# Patient Record
Sex: FEMALE | Race: WHITE | NOT HISPANIC OR LATINO | Employment: OTHER | URBAN - METROPOLITAN AREA
[De-identification: names, ages, dates, MRNs, and addresses within clinical notes are randomized per-mention and may not be internally consistent; named-entity substitution may affect disease eponyms.]

---

## 2020-05-21 ENCOUNTER — HOSPITAL ENCOUNTER (EMERGENCY)
Facility: HOSPITAL | Age: 75
Discharge: HOME | End: 2020-05-22
Attending: EMERGENCY MEDICINE
Payer: MEDICARE

## 2020-05-21 DIAGNOSIS — R10.32 LEFT LOWER QUADRANT ABDOMINAL PAIN: Primary | ICD-10-CM

## 2020-05-21 DIAGNOSIS — K57.92 ACUTE DIVERTICULITIS: ICD-10-CM

## 2020-05-21 DIAGNOSIS — R93.89 ABNORMAL CT SCAN: ICD-10-CM

## 2020-05-21 PROCEDURE — 99285 EMERGENCY DEPT VISIT HI MDM: CPT | Performed by: PHYSICIAN ASSISTANT

## 2020-05-21 PROCEDURE — 99284 EMERGENCY DEPT VISIT MOD MDM: CPT

## 2020-05-21 RX ORDER — GABAPENTIN 100 MG/1
200 CAPSULE ORAL 2 TIMES DAILY
COMMUNITY
End: 2021-03-01 | Stop reason: ALTCHOICE

## 2020-05-21 RX ORDER — LEVOTHYROXINE SODIUM 0.03 MG/1
25 TABLET ORAL DAILY
COMMUNITY

## 2020-05-22 ENCOUNTER — APPOINTMENT (EMERGENCY)
Dept: CT IMAGING | Facility: HOSPITAL | Age: 75
End: 2020-05-22
Payer: MEDICARE

## 2020-05-22 VITALS
RESPIRATION RATE: 18 BRPM | SYSTOLIC BLOOD PRESSURE: 118 MMHG | WEIGHT: 168.21 LBS | OXYGEN SATURATION: 94 % | DIASTOLIC BLOOD PRESSURE: 57 MMHG | HEART RATE: 68 BPM | TEMPERATURE: 98.3 F

## 2020-05-22 LAB
ALBUMIN SERPL BCP-MCNC: 3.1 G/DL (ref 3.5–5)
ALP SERPL-CCNC: 92 U/L (ref 46–116)
ALT SERPL W P-5'-P-CCNC: 18 U/L (ref 12–78)
ANION GAP SERPL CALCULATED.3IONS-SCNC: 9 MMOL/L (ref 4–13)
AST SERPL W P-5'-P-CCNC: 18 U/L (ref 5–45)
BASOPHILS # BLD AUTO: 0.13 THOUSANDS/ΜL (ref 0–0.1)
BASOPHILS NFR BLD AUTO: 1 % (ref 0–1)
BILIRUB SERPL-MCNC: 0.4 MG/DL (ref 0.2–1)
BUN SERPL-MCNC: 12 MG/DL (ref 5–25)
CALCIUM SERPL-MCNC: 8.7 MG/DL (ref 8.3–10.1)
CHLORIDE SERPL-SCNC: 103 MMOL/L (ref 100–108)
CO2 SERPL-SCNC: 29 MMOL/L (ref 21–32)
CREAT SERPL-MCNC: 1.02 MG/DL (ref 0.6–1.3)
EOSINOPHIL # BLD AUTO: 0.19 THOUSAND/ΜL (ref 0–0.61)
EOSINOPHIL NFR BLD AUTO: 2 % (ref 0–6)
ERYTHROCYTE [DISTWIDTH] IN BLOOD BY AUTOMATED COUNT: 14.3 % (ref 11.6–15.1)
GFR SERPL CREATININE-BSD FRML MDRD: 55 ML/MIN/1.73SQ M
GLUCOSE SERPL-MCNC: 91 MG/DL (ref 65–140)
HCT VFR BLD AUTO: 36.9 % (ref 34.8–46.1)
HGB BLD-MCNC: 12 G/DL (ref 11.5–15.4)
IMM GRANULOCYTES # BLD AUTO: 0.07 THOUSAND/UL (ref 0–0.2)
IMM GRANULOCYTES NFR BLD AUTO: 1 % (ref 0–2)
LACTATE SERPL-SCNC: 1 MMOL/L (ref 0.5–2)
LIPASE SERPL-CCNC: 48 U/L (ref 73–393)
LYMPHOCYTES # BLD AUTO: 1.74 THOUSANDS/ΜL (ref 0.6–4.47)
LYMPHOCYTES NFR BLD AUTO: 15 % (ref 14–44)
MCH RBC QN AUTO: 32.1 PG (ref 26.8–34.3)
MCHC RBC AUTO-ENTMCNC: 32.5 G/DL (ref 31.4–37.4)
MCV RBC AUTO: 99 FL (ref 82–98)
MONOCYTES # BLD AUTO: 0.86 THOUSAND/ΜL (ref 0.17–1.22)
MONOCYTES NFR BLD AUTO: 8 % (ref 4–12)
NEUTROPHILS # BLD AUTO: 8.47 THOUSANDS/ΜL (ref 1.85–7.62)
NEUTS SEG NFR BLD AUTO: 73 % (ref 43–75)
NRBC BLD AUTO-RTO: 0 /100 WBCS
PLATELET # BLD AUTO: 485 THOUSANDS/UL (ref 149–390)
PMV BLD AUTO: 10.1 FL (ref 8.9–12.7)
POTASSIUM SERPL-SCNC: 3.7 MMOL/L (ref 3.5–5.3)
PROT SERPL-MCNC: 6.7 G/DL (ref 6.4–8.2)
RBC # BLD AUTO: 3.74 MILLION/UL (ref 3.81–5.12)
SODIUM SERPL-SCNC: 141 MMOL/L (ref 136–145)
WBC # BLD AUTO: 11.46 THOUSAND/UL (ref 4.31–10.16)

## 2020-05-22 PROCEDURE — 85025 COMPLETE CBC W/AUTO DIFF WBC: CPT | Performed by: PHYSICIAN ASSISTANT

## 2020-05-22 PROCEDURE — 83690 ASSAY OF LIPASE: CPT | Performed by: PHYSICIAN ASSISTANT

## 2020-05-22 PROCEDURE — 80053 COMPREHEN METABOLIC PANEL: CPT | Performed by: PHYSICIAN ASSISTANT

## 2020-05-22 PROCEDURE — 83605 ASSAY OF LACTIC ACID: CPT | Performed by: PHYSICIAN ASSISTANT

## 2020-05-22 PROCEDURE — 74177 CT ABD & PELVIS W/CONTRAST: CPT

## 2020-05-22 PROCEDURE — 96374 THER/PROPH/DIAG INJ IV PUSH: CPT

## 2020-05-22 PROCEDURE — 36415 COLL VENOUS BLD VENIPUNCTURE: CPT | Performed by: PHYSICIAN ASSISTANT

## 2020-05-22 PROCEDURE — 96375 TX/PRO/DX INJ NEW DRUG ADDON: CPT

## 2020-05-22 RX ORDER — KETOROLAC TROMETHAMINE 30 MG/ML
15 INJECTION, SOLUTION INTRAMUSCULAR; INTRAVENOUS ONCE
Status: COMPLETED | OUTPATIENT
Start: 2020-05-22 | End: 2020-05-22

## 2020-05-22 RX ORDER — METRONIDAZOLE 500 MG/1
500 TABLET ORAL EVERY 8 HOURS SCHEDULED
Qty: 30 TABLET | Refills: 0 | Status: SHIPPED | OUTPATIENT
Start: 2020-05-22 | End: 2020-06-01

## 2020-05-22 RX ORDER — SULFAMETHOXAZOLE AND TRIMETHOPRIM 800; 160 MG/1; MG/1
1 TABLET ORAL 2 TIMES DAILY
Qty: 20 TABLET | Refills: 0 | Status: SHIPPED | OUTPATIENT
Start: 2020-05-22 | End: 2020-06-01

## 2020-05-22 RX ORDER — SULFAMETHOXAZOLE AND TRIMETHOPRIM 800; 160 MG/1; MG/1
1 TABLET ORAL ONCE
Status: COMPLETED | OUTPATIENT
Start: 2020-05-22 | End: 2020-05-22

## 2020-05-22 RX ORDER — ONDANSETRON 2 MG/ML
4 INJECTION INTRAMUSCULAR; INTRAVENOUS ONCE
Status: COMPLETED | OUTPATIENT
Start: 2020-05-22 | End: 2020-05-22

## 2020-05-22 RX ORDER — METRONIDAZOLE 500 MG/1
500 TABLET ORAL ONCE
Status: COMPLETED | OUTPATIENT
Start: 2020-05-22 | End: 2020-05-22

## 2020-05-22 RX ADMIN — ONDANSETRON 4 MG: 2 INJECTION INTRAMUSCULAR; INTRAVENOUS at 00:19

## 2020-05-22 RX ADMIN — SULFAMETHOXAZOLE AND TRIMETHOPRIM 1 TABLET: 800; 160 TABLET ORAL at 02:11

## 2020-05-22 RX ADMIN — KETOROLAC TROMETHAMINE 15 MG: 30 INJECTION, SOLUTION INTRAMUSCULAR at 02:11

## 2020-05-22 RX ADMIN — IOHEXOL 100 ML: 350 INJECTION, SOLUTION INTRAVENOUS at 01:05

## 2020-05-22 RX ADMIN — METRONIDAZOLE 500 MG: 500 TABLET ORAL at 02:11

## 2020-06-04 ENCOUNTER — APPOINTMENT (EMERGENCY)
Dept: RADIOLOGY | Facility: HOSPITAL | Age: 75
DRG: 813 | End: 2020-06-04
Payer: MEDICARE

## 2020-06-04 ENCOUNTER — HOSPITAL ENCOUNTER (INPATIENT)
Facility: HOSPITAL | Age: 75
LOS: 9 days | Discharge: HOME | DRG: 813 | End: 2020-06-14
Attending: EMERGENCY MEDICINE | Admitting: INTERNAL MEDICINE
Payer: MEDICARE

## 2020-06-04 DIAGNOSIS — M79.89 ARM SWELLING: ICD-10-CM

## 2020-06-04 DIAGNOSIS — R79.1 SUPRATHERAPEUTIC INR: Primary | ICD-10-CM

## 2020-06-04 DIAGNOSIS — I48.91 ATRIAL FIBRILLATION, UNSPECIFIED TYPE (HCC): ICD-10-CM

## 2020-06-04 DIAGNOSIS — K59.00 CONSTIPATION: ICD-10-CM

## 2020-06-04 DIAGNOSIS — Z95.2 H/O PROSTHETIC HEART VALVE: ICD-10-CM

## 2020-06-04 DIAGNOSIS — D62 ACUTE BLOOD LOSS ANEMIA: ICD-10-CM

## 2020-06-04 DIAGNOSIS — S40.021A ARM CONTUSION, RIGHT, INITIAL ENCOUNTER: ICD-10-CM

## 2020-06-04 LAB
BASOPHILS # BLD AUTO: 0.09 THOUSANDS/ΜL (ref 0–0.1)
BASOPHILS NFR BLD AUTO: 1 % (ref 0–1)
EOSINOPHIL # BLD AUTO: 0.13 THOUSAND/ΜL (ref 0–0.61)
EOSINOPHIL NFR BLD AUTO: 2 % (ref 0–6)
ERYTHROCYTE [DISTWIDTH] IN BLOOD BY AUTOMATED COUNT: 15.5 % (ref 11.6–15.1)
HCT VFR BLD AUTO: 31.8 % (ref 34.8–46.1)
HGB BLD-MCNC: 10 G/DL (ref 11.5–15.4)
IMM GRANULOCYTES # BLD AUTO: 0.04 THOUSAND/UL (ref 0–0.2)
IMM GRANULOCYTES NFR BLD AUTO: 1 % (ref 0–2)
INR PPP: >15 (ref 0.84–1.19)
LYMPHOCYTES # BLD AUTO: 1.44 THOUSANDS/ΜL (ref 0.6–4.47)
LYMPHOCYTES NFR BLD AUTO: 19 % (ref 14–44)
MCH RBC QN AUTO: 31.3 PG (ref 26.8–34.3)
MCHC RBC AUTO-ENTMCNC: 31.4 G/DL (ref 31.4–37.4)
MCV RBC AUTO: 99 FL (ref 82–98)
MONOCYTES # BLD AUTO: 0.69 THOUSAND/ΜL (ref 0.17–1.22)
MONOCYTES NFR BLD AUTO: 9 % (ref 4–12)
NEUTROPHILS # BLD AUTO: 5.13 THOUSANDS/ΜL (ref 1.85–7.62)
NEUTS SEG NFR BLD AUTO: 68 % (ref 43–75)
NRBC BLD AUTO-RTO: 0 /100 WBCS
PLATELET # BLD AUTO: 382 THOUSANDS/UL (ref 149–390)
PMV BLD AUTO: 9.6 FL (ref 8.9–12.7)
PROTHROMBIN TIME: 115.9 SECONDS (ref 11.6–14.5)
RBC # BLD AUTO: 3.2 MILLION/UL (ref 3.81–5.12)
WBC # BLD AUTO: 7.52 THOUSAND/UL (ref 4.31–10.16)

## 2020-06-04 PROCEDURE — 36415 COLL VENOUS BLD VENIPUNCTURE: CPT | Performed by: EMERGENCY MEDICINE

## 2020-06-04 PROCEDURE — 99220 PR INITIAL OBSERVATION CARE/DAY 70 MINUTES: CPT | Performed by: NURSE PRACTITIONER

## 2020-06-04 PROCEDURE — 99284 EMERGENCY DEPT VISIT MOD MDM: CPT

## 2020-06-04 PROCEDURE — 99284 EMERGENCY DEPT VISIT MOD MDM: CPT | Performed by: EMERGENCY MEDICINE

## 2020-06-04 PROCEDURE — 73060 X-RAY EXAM OF HUMERUS: CPT

## 2020-06-04 PROCEDURE — 85610 PROTHROMBIN TIME: CPT | Performed by: EMERGENCY MEDICINE

## 2020-06-04 PROCEDURE — 85025 COMPLETE CBC W/AUTO DIFF WBC: CPT | Performed by: EMERGENCY MEDICINE

## 2020-06-04 RX ORDER — MAGNESIUM HYDROXIDE/ALUMINUM HYDROXICE/SIMETHICONE 120; 1200; 1200 MG/30ML; MG/30ML; MG/30ML
30 SUSPENSION ORAL EVERY 6 HOURS PRN
Status: DISCONTINUED | OUTPATIENT
Start: 2020-06-04 | End: 2020-06-14 | Stop reason: HOSPADM

## 2020-06-04 RX ORDER — TRAZODONE HYDROCHLORIDE 50 MG/1
50 TABLET ORAL
Status: DISCONTINUED | OUTPATIENT
Start: 2020-06-04 | End: 2020-06-14 | Stop reason: HOSPADM

## 2020-06-04 RX ORDER — VENLAFAXINE 37.5 MG/1
75 TABLET ORAL 2 TIMES DAILY
Status: DISCONTINUED | OUTPATIENT
Start: 2020-06-05 | End: 2020-06-14 | Stop reason: HOSPADM

## 2020-06-04 RX ORDER — LANOLIN ALCOHOL/MO/W.PET/CERES
3 CREAM (GRAM) TOPICAL
Status: DISCONTINUED | OUTPATIENT
Start: 2020-06-04 | End: 2020-06-14 | Stop reason: HOSPADM

## 2020-06-04 RX ORDER — VENLAFAXINE 75 MG/1
75 TABLET ORAL DAILY
COMMUNITY

## 2020-06-04 RX ORDER — PHYTONADIONE 5 MG/1
5 TABLET ORAL ONCE
Status: COMPLETED | OUTPATIENT
Start: 2020-06-04 | End: 2020-06-04

## 2020-06-04 RX ORDER — LORAZEPAM 1 MG/1
1 TABLET ORAL
Status: DISCONTINUED | OUTPATIENT
Start: 2020-06-04 | End: 2020-06-09

## 2020-06-04 RX ORDER — LEVOTHYROXINE SODIUM 0.03 MG/1
25 TABLET ORAL
Status: DISCONTINUED | OUTPATIENT
Start: 2020-06-05 | End: 2020-06-14 | Stop reason: HOSPADM

## 2020-06-04 RX ORDER — ACETAMINOPHEN 325 MG/1
650 TABLET ORAL EVERY 6 HOURS PRN
Status: DISCONTINUED | OUTPATIENT
Start: 2020-06-04 | End: 2020-06-14 | Stop reason: HOSPADM

## 2020-06-04 RX ORDER — SULFAMETHOXAZOLE AND TRIMETHOPRIM 400; 80 MG/1; MG/1
2 TABLET ORAL EVERY 12 HOURS SCHEDULED
COMMUNITY
End: 2020-06-14 | Stop reason: HOSPADM

## 2020-06-04 RX ORDER — METRONIDAZOLE 375 MG/1
375 CAPSULE ORAL 2 TIMES DAILY
COMMUNITY
Start: 2020-05-21 | End: 2020-06-14 | Stop reason: HOSPADM

## 2020-06-04 RX ORDER — AMIODARONE HYDROCHLORIDE 200 MG/1
200 TABLET ORAL DAILY
COMMUNITY
End: 2020-08-24 | Stop reason: SDUPTHER

## 2020-06-04 RX ORDER — UREA 10 %
3 LOTION (ML) TOPICAL
COMMUNITY

## 2020-06-04 RX ORDER — TRAZODONE HYDROCHLORIDE 50 MG/1
50 TABLET ORAL
COMMUNITY

## 2020-06-04 RX ORDER — WARFARIN SODIUM 5 MG/1
5 TABLET ORAL
Status: ON HOLD | COMMUNITY
End: 2020-06-14 | Stop reason: SDUPTHER

## 2020-06-04 RX ORDER — LORAZEPAM 1 MG/1
1 TABLET ORAL
COMMUNITY

## 2020-06-04 RX ORDER — OXYCODONE HYDROCHLORIDE 5 MG/1
5 TABLET ORAL EVERY 6 HOURS PRN
Status: DISCONTINUED | OUTPATIENT
Start: 2020-06-04 | End: 2020-06-14 | Stop reason: HOSPADM

## 2020-06-04 RX ORDER — ONDANSETRON 2 MG/ML
4 INJECTION INTRAMUSCULAR; INTRAVENOUS EVERY 6 HOURS PRN
Status: DISCONTINUED | OUTPATIENT
Start: 2020-06-04 | End: 2020-06-14 | Stop reason: HOSPADM

## 2020-06-04 RX ORDER — GABAPENTIN 100 MG/1
100 CAPSULE ORAL 2 TIMES DAILY
Status: DISCONTINUED | OUTPATIENT
Start: 2020-06-05 | End: 2020-06-14 | Stop reason: HOSPADM

## 2020-06-04 RX ORDER — AMOXICILLIN 250 MG
1 CAPSULE ORAL
Status: DISCONTINUED | OUTPATIENT
Start: 2020-06-04 | End: 2020-06-14 | Stop reason: HOSPADM

## 2020-06-04 RX ORDER — AMIODARONE HYDROCHLORIDE 200 MG/1
200 TABLET ORAL DAILY
Status: DISCONTINUED | OUTPATIENT
Start: 2020-06-05 | End: 2020-06-14 | Stop reason: HOSPADM

## 2020-06-04 RX ADMIN — PHYTONADIONE 5 MG: 5 TABLET ORAL at 21:29

## 2020-06-05 ENCOUNTER — APPOINTMENT (INPATIENT)
Dept: CT IMAGING | Facility: HOSPITAL | Age: 75
DRG: 813 | End: 2020-06-05
Payer: MEDICARE

## 2020-06-05 PROBLEM — K57.92 DIVERTICULITIS: Status: ACTIVE | Noted: 2020-06-05

## 2020-06-05 PROBLEM — D62 ACUTE BLOOD LOSS ANEMIA: Status: ACTIVE | Noted: 2020-06-05

## 2020-06-05 LAB
ANION GAP SERPL CALCULATED.3IONS-SCNC: 7 MMOL/L (ref 4–13)
BUN SERPL-MCNC: 7 MG/DL (ref 5–25)
CALCIUM SERPL-MCNC: 8.8 MG/DL (ref 8.3–10.1)
CHLORIDE SERPL-SCNC: 106 MMOL/L (ref 100–108)
CO2 SERPL-SCNC: 28 MMOL/L (ref 21–32)
CREAT SERPL-MCNC: 0.8 MG/DL (ref 0.6–1.3)
ERYTHROCYTE [DISTWIDTH] IN BLOOD BY AUTOMATED COUNT: 15.8 % (ref 11.6–15.1)
GFR SERPL CREATININE-BSD FRML MDRD: 73 ML/MIN/1.73SQ M
GLUCOSE SERPL-MCNC: 113 MG/DL (ref 65–140)
HCT VFR BLD AUTO: 29.3 % (ref 34.8–46.1)
HCT VFR BLD AUTO: 30.6 % (ref 34.8–46.1)
HGB BLD-MCNC: 9.2 G/DL (ref 11.5–15.4)
HGB BLD-MCNC: 9.6 G/DL (ref 11.5–15.4)
INR PPP: 12.75 (ref 0.84–1.19)
INR PPP: 6.22 (ref 0.84–1.19)
MCH RBC QN AUTO: 31.3 PG (ref 26.8–34.3)
MCHC RBC AUTO-ENTMCNC: 31.4 G/DL (ref 31.4–37.4)
MCV RBC AUTO: 100 FL (ref 82–98)
PLATELET # BLD AUTO: 376 THOUSANDS/UL (ref 149–390)
PMV BLD AUTO: 9.8 FL (ref 8.9–12.7)
POTASSIUM SERPL-SCNC: 3.6 MMOL/L (ref 3.5–5.3)
PROTHROMBIN TIME: 56.3 SECONDS (ref 11.6–14.5)
PROTHROMBIN TIME: 99.5 SECONDS (ref 11.6–14.5)
RBC # BLD AUTO: 3.07 MILLION/UL (ref 3.81–5.12)
SODIUM SERPL-SCNC: 141 MMOL/L (ref 136–145)
WBC # BLD AUTO: 6.69 THOUSAND/UL (ref 4.31–10.16)

## 2020-06-05 PROCEDURE — 74176 CT ABD & PELVIS W/O CONTRAST: CPT

## 2020-06-05 PROCEDURE — 73200 CT UPPER EXTREMITY W/O DYE: CPT

## 2020-06-05 PROCEDURE — 85610 PROTHROMBIN TIME: CPT | Performed by: NURSE PRACTITIONER

## 2020-06-05 PROCEDURE — 85027 COMPLETE CBC AUTOMATED: CPT | Performed by: NURSE PRACTITIONER

## 2020-06-05 PROCEDURE — 99233 SBSQ HOSP IP/OBS HIGH 50: CPT | Performed by: INTERNAL MEDICINE

## 2020-06-05 PROCEDURE — 80048 BASIC METABOLIC PNL TOTAL CA: CPT | Performed by: NURSE PRACTITIONER

## 2020-06-05 PROCEDURE — 85018 HEMOGLOBIN: CPT | Performed by: NURSE PRACTITIONER

## 2020-06-05 PROCEDURE — 93005 ELECTROCARDIOGRAM TRACING: CPT

## 2020-06-05 PROCEDURE — 85014 HEMATOCRIT: CPT | Performed by: NURSE PRACTITIONER

## 2020-06-05 RX ADMIN — MELATONIN 3 MG: 3 TAB ORAL at 21:14

## 2020-06-05 RX ADMIN — SENNOSIDES AND DOCUSATE SODIUM 1 TABLET: 8.6; 5 TABLET ORAL at 21:13

## 2020-06-05 RX ADMIN — TRAZODONE HYDROCHLORIDE 50 MG: 50 TABLET ORAL at 21:13

## 2020-06-05 RX ADMIN — OXYCODONE HYDROCHLORIDE 5 MG: 5 TABLET ORAL at 00:12

## 2020-06-05 RX ADMIN — LEVOTHYROXINE SODIUM 25 MCG: 25 TABLET ORAL at 06:10

## 2020-06-05 RX ADMIN — TRAZODONE HYDROCHLORIDE 50 MG: 50 TABLET ORAL at 00:12

## 2020-06-05 RX ADMIN — LORAZEPAM 1 MG: 1 TABLET ORAL at 00:12

## 2020-06-05 RX ADMIN — OXYCODONE HYDROCHLORIDE 5 MG: 5 TABLET ORAL at 21:13

## 2020-06-05 RX ADMIN — VENLAFAXINE 75 MG: 37.5 TABLET ORAL at 17:23

## 2020-06-05 RX ADMIN — VENLAFAXINE 75 MG: 37.5 TABLET ORAL at 08:07

## 2020-06-05 RX ADMIN — LORAZEPAM 1 MG: 1 TABLET ORAL at 21:14

## 2020-06-05 RX ADMIN — AMIODARONE HYDROCHLORIDE 200 MG: 200 TABLET ORAL at 08:07

## 2020-06-05 RX ADMIN — MELATONIN 3 MG: 3 TAB ORAL at 00:12

## 2020-06-05 RX ADMIN — OXYCODONE HYDROCHLORIDE 5 MG: 5 TABLET ORAL at 15:27

## 2020-06-05 RX ADMIN — GABAPENTIN 100 MG: 100 CAPSULE ORAL at 08:07

## 2020-06-05 RX ADMIN — GABAPENTIN 100 MG: 100 CAPSULE ORAL at 17:23

## 2020-06-05 RX ADMIN — OXYCODONE HYDROCHLORIDE 5 MG: 5 TABLET ORAL at 08:07

## 2020-06-05 RX ADMIN — SENNOSIDES AND DOCUSATE SODIUM 1 TABLET: 8.6; 5 TABLET ORAL at 00:12

## 2020-06-06 ENCOUNTER — APPOINTMENT (INPATIENT)
Dept: CT IMAGING | Facility: HOSPITAL | Age: 75
DRG: 813 | End: 2020-06-06
Payer: MEDICARE

## 2020-06-06 PROBLEM — R79.1 SUPRATHERAPEUTIC INR: Status: RESOLVED | Noted: 2020-06-04 | Resolved: 2020-06-06

## 2020-06-06 LAB
APTT PPP: 41 SECONDS (ref 23–37)
APTT PPP: 63 SECONDS (ref 23–37)
ATRIAL RATE: 80 BPM
ERYTHROCYTE [DISTWIDTH] IN BLOOD BY AUTOMATED COUNT: 15.6 % (ref 11.6–15.1)
HCT VFR BLD AUTO: 27.7 % (ref 34.8–46.1)
HCT VFR BLD AUTO: 27.8 % (ref 34.8–46.1)
HCT VFR BLD AUTO: 28.2 % (ref 34.8–46.1)
HGB BLD-MCNC: 8.7 G/DL (ref 11.5–15.4)
HGB BLD-MCNC: 8.8 G/DL (ref 11.5–15.4)
HGB BLD-MCNC: 8.8 G/DL (ref 11.5–15.4)
INR PPP: 1.57 (ref 0.84–1.19)
INR PPP: 1.73 (ref 0.84–1.19)
MCH RBC QN AUTO: 31.6 PG (ref 26.8–34.3)
MCHC RBC AUTO-ENTMCNC: 30.9 G/DL (ref 31.4–37.4)
MCV RBC AUTO: 103 FL (ref 82–98)
P AXIS: 54 DEGREES
PLATELET # BLD AUTO: 354 THOUSANDS/UL (ref 149–390)
PMV BLD AUTO: 9.9 FL (ref 8.9–12.7)
PR INTERVAL: 156 MS
PROTHROMBIN TIME: 18.9 SECONDS (ref 11.6–14.5)
PROTHROMBIN TIME: 20.4 SECONDS (ref 11.6–14.5)
QRS AXIS: 29 DEGREES
QRSD INTERVAL: 90 MS
QT INTERVAL: 408 MS
QTC INTERVAL: 470 MS
RBC # BLD AUTO: 2.75 MILLION/UL (ref 3.81–5.12)
T WAVE AXIS: 61 DEGREES
VENTRICULAR RATE: 80 BPM
WBC # BLD AUTO: 7.39 THOUSAND/UL (ref 4.31–10.16)

## 2020-06-06 PROCEDURE — 85027 COMPLETE CBC AUTOMATED: CPT | Performed by: INTERNAL MEDICINE

## 2020-06-06 PROCEDURE — 85014 HEMATOCRIT: CPT | Performed by: INTERNAL MEDICINE

## 2020-06-06 PROCEDURE — 73200 CT UPPER EXTREMITY W/O DYE: CPT

## 2020-06-06 PROCEDURE — 93010 ELECTROCARDIOGRAM REPORT: CPT | Performed by: INTERNAL MEDICINE

## 2020-06-06 PROCEDURE — 99233 SBSQ HOSP IP/OBS HIGH 50: CPT | Performed by: INTERNAL MEDICINE

## 2020-06-06 PROCEDURE — 85610 PROTHROMBIN TIME: CPT | Performed by: NURSE PRACTITIONER

## 2020-06-06 PROCEDURE — 85730 THROMBOPLASTIN TIME PARTIAL: CPT | Performed by: INTERNAL MEDICINE

## 2020-06-06 PROCEDURE — 85018 HEMOGLOBIN: CPT | Performed by: INTERNAL MEDICINE

## 2020-06-06 PROCEDURE — 85610 PROTHROMBIN TIME: CPT | Performed by: INTERNAL MEDICINE

## 2020-06-06 RX ORDER — WARFARIN SODIUM 2.5 MG/1
2.5 TABLET ORAL
Status: DISCONTINUED | OUTPATIENT
Start: 2020-06-06 | End: 2020-06-14 | Stop reason: HOSPADM

## 2020-06-06 RX ORDER — HEPARIN SODIUM 10000 [USP'U]/100ML
3-20 INJECTION, SOLUTION INTRAVENOUS
Status: DISCONTINUED | OUTPATIENT
Start: 2020-06-06 | End: 2020-06-14 | Stop reason: HOSPADM

## 2020-06-06 RX ADMIN — GABAPENTIN 100 MG: 100 CAPSULE ORAL at 09:26

## 2020-06-06 RX ADMIN — LEVOTHYROXINE SODIUM 25 MCG: 25 TABLET ORAL at 05:59

## 2020-06-06 RX ADMIN — SENNOSIDES AND DOCUSATE SODIUM 1 TABLET: 8.6; 5 TABLET ORAL at 21:14

## 2020-06-06 RX ADMIN — AMIODARONE HYDROCHLORIDE 200 MG: 200 TABLET ORAL at 09:26

## 2020-06-06 RX ADMIN — LORAZEPAM 1 MG: 1 TABLET ORAL at 21:15

## 2020-06-06 RX ADMIN — ACETAMINOPHEN 650 MG: 325 TABLET, FILM COATED ORAL at 17:37

## 2020-06-06 RX ADMIN — MELATONIN 3 MG: 3 TAB ORAL at 21:14

## 2020-06-06 RX ADMIN — VENLAFAXINE 75 MG: 37.5 TABLET ORAL at 17:37

## 2020-06-06 RX ADMIN — GABAPENTIN 100 MG: 100 CAPSULE ORAL at 17:37

## 2020-06-06 RX ADMIN — ACETAMINOPHEN 650 MG: 325 TABLET, FILM COATED ORAL at 09:26

## 2020-06-06 RX ADMIN — TRAZODONE HYDROCHLORIDE 50 MG: 50 TABLET ORAL at 21:15

## 2020-06-06 RX ADMIN — VENLAFAXINE 75 MG: 37.5 TABLET ORAL at 09:26

## 2020-06-06 RX ADMIN — HEPARIN SODIUM 12 UNITS/KG/HR: 10000 INJECTION, SOLUTION INTRAVENOUS at 10:17

## 2020-06-07 LAB
APTT PPP: 165 SECONDS (ref 23–37)
APTT PPP: 50 SECONDS (ref 23–37)
APTT PPP: 51 SECONDS (ref 23–37)
APTT PPP: 86 SECONDS (ref 23–37)
ERYTHROCYTE [DISTWIDTH] IN BLOOD BY AUTOMATED COUNT: 15.5 % (ref 11.6–15.1)
HCT VFR BLD AUTO: 28 % (ref 34.8–46.1)
HCT VFR BLD AUTO: 29 % (ref 34.8–46.1)
HGB BLD-MCNC: 8.8 G/DL (ref 11.5–15.4)
HGB BLD-MCNC: 9.2 G/DL (ref 11.5–15.4)
INR PPP: 1.61 (ref 0.84–1.19)
MCH RBC QN AUTO: 32.1 PG (ref 26.8–34.3)
MCHC RBC AUTO-ENTMCNC: 31.4 G/DL (ref 31.4–37.4)
MCV RBC AUTO: 102 FL (ref 82–98)
PLATELET # BLD AUTO: 351 THOUSANDS/UL (ref 149–390)
PMV BLD AUTO: 9.8 FL (ref 8.9–12.7)
PROTHROMBIN TIME: 19.2 SECONDS (ref 11.6–14.5)
RBC # BLD AUTO: 2.74 MILLION/UL (ref 3.81–5.12)
WBC # BLD AUTO: 8.06 THOUSAND/UL (ref 4.31–10.16)

## 2020-06-07 PROCEDURE — 85027 COMPLETE CBC AUTOMATED: CPT | Performed by: INTERNAL MEDICINE

## 2020-06-07 PROCEDURE — 85018 HEMOGLOBIN: CPT | Performed by: INTERNAL MEDICINE

## 2020-06-07 PROCEDURE — 99233 SBSQ HOSP IP/OBS HIGH 50: CPT | Performed by: INTERNAL MEDICINE

## 2020-06-07 PROCEDURE — 85730 THROMBOPLASTIN TIME PARTIAL: CPT | Performed by: INTERNAL MEDICINE

## 2020-06-07 PROCEDURE — 85610 PROTHROMBIN TIME: CPT | Performed by: NURSE PRACTITIONER

## 2020-06-07 PROCEDURE — 85014 HEMATOCRIT: CPT | Performed by: INTERNAL MEDICINE

## 2020-06-07 RX ORDER — DIPHENHYDRAMINE HCL 25 MG
12.5 TABLET ORAL EVERY 6 HOURS PRN
Status: DISCONTINUED | OUTPATIENT
Start: 2020-06-07 | End: 2020-06-14 | Stop reason: HOSPADM

## 2020-06-07 RX ORDER — WARFARIN SODIUM 2.5 MG/1
2.5 TABLET ORAL
Status: DISCONTINUED | OUTPATIENT
Start: 2020-06-07 | End: 2020-06-09

## 2020-06-07 RX ADMIN — AMIODARONE HYDROCHLORIDE 200 MG: 200 TABLET ORAL at 08:03

## 2020-06-07 RX ADMIN — HEPARIN SODIUM 13 UNITS/KG/HR: 10000 INJECTION, SOLUTION INTRAVENOUS at 19:35

## 2020-06-07 RX ADMIN — VENLAFAXINE 75 MG: 37.5 TABLET ORAL at 17:09

## 2020-06-07 RX ADMIN — GABAPENTIN 100 MG: 100 CAPSULE ORAL at 17:09

## 2020-06-07 RX ADMIN — MELATONIN 3 MG: 3 TAB ORAL at 21:32

## 2020-06-07 RX ADMIN — LORAZEPAM 1 MG: 1 TABLET ORAL at 21:32

## 2020-06-07 RX ADMIN — SENNOSIDES AND DOCUSATE SODIUM 1 TABLET: 8.6; 5 TABLET ORAL at 21:31

## 2020-06-07 RX ADMIN — TRAZODONE HYDROCHLORIDE 50 MG: 50 TABLET ORAL at 21:32

## 2020-06-07 RX ADMIN — GABAPENTIN 100 MG: 100 CAPSULE ORAL at 08:03

## 2020-06-07 RX ADMIN — ACETAMINOPHEN 650 MG: 325 TABLET, FILM COATED ORAL at 21:31

## 2020-06-07 RX ADMIN — OXYCODONE HYDROCHLORIDE 5 MG: 5 TABLET ORAL at 01:11

## 2020-06-07 RX ADMIN — WARFARIN SODIUM 2.5 MG: 2.5 TABLET ORAL at 17:09

## 2020-06-07 RX ADMIN — DIPHENHYDRAMINE HCL 12.5 MG: 25 TABLET, COATED ORAL at 18:08

## 2020-06-07 RX ADMIN — LEVOTHYROXINE SODIUM 25 MCG: 25 TABLET ORAL at 05:22

## 2020-06-07 RX ADMIN — VENLAFAXINE 75 MG: 37.5 TABLET ORAL at 08:03

## 2020-06-08 LAB
ANION GAP SERPL CALCULATED.3IONS-SCNC: 6 MMOL/L (ref 4–13)
APTT PPP: 130 SECONDS (ref 23–37)
APTT PPP: 42 SECONDS (ref 23–37)
APTT PPP: 84 SECONDS (ref 23–37)
APTT PPP: 87 SECONDS (ref 23–37)
BUN SERPL-MCNC: 10 MG/DL (ref 5–25)
CALCIUM SERPL-MCNC: 8.5 MG/DL (ref 8.3–10.1)
CHLORIDE SERPL-SCNC: 106 MMOL/L (ref 100–108)
CO2 SERPL-SCNC: 29 MMOL/L (ref 21–32)
CREAT SERPL-MCNC: 0.85 MG/DL (ref 0.6–1.3)
ERYTHROCYTE [DISTWIDTH] IN BLOOD BY AUTOMATED COUNT: 15.4 % (ref 11.6–15.1)
GFR SERPL CREATININE-BSD FRML MDRD: 68 ML/MIN/1.73SQ M
GLUCOSE SERPL-MCNC: 103 MG/DL (ref 65–140)
HCT VFR BLD AUTO: 28.3 % (ref 34.8–46.1)
HCT VFR BLD AUTO: 30.4 % (ref 34.8–46.1)
HGB BLD-MCNC: 8.8 G/DL (ref 11.5–15.4)
HGB BLD-MCNC: 9.7 G/DL (ref 11.5–15.4)
INR PPP: 1.66 (ref 0.84–1.19)
MCH RBC QN AUTO: 31.8 PG (ref 26.8–34.3)
MCHC RBC AUTO-ENTMCNC: 31.1 G/DL (ref 31.4–37.4)
MCV RBC AUTO: 102 FL (ref 82–98)
PLATELET # BLD AUTO: 396 THOUSANDS/UL (ref 149–390)
PMV BLD AUTO: 9.6 FL (ref 8.9–12.7)
POTASSIUM SERPL-SCNC: 3.9 MMOL/L (ref 3.5–5.3)
PROTHROMBIN TIME: 19.7 SECONDS (ref 11.6–14.5)
RBC # BLD AUTO: 2.77 MILLION/UL (ref 3.81–5.12)
SODIUM SERPL-SCNC: 141 MMOL/L (ref 136–145)
WBC # BLD AUTO: 6.01 THOUSAND/UL (ref 4.31–10.16)

## 2020-06-08 PROCEDURE — 85610 PROTHROMBIN TIME: CPT | Performed by: NURSE PRACTITIONER

## 2020-06-08 PROCEDURE — 99233 SBSQ HOSP IP/OBS HIGH 50: CPT | Performed by: INTERNAL MEDICINE

## 2020-06-08 PROCEDURE — 85027 COMPLETE CBC AUTOMATED: CPT | Performed by: INTERNAL MEDICINE

## 2020-06-08 PROCEDURE — 85018 HEMOGLOBIN: CPT | Performed by: INTERNAL MEDICINE

## 2020-06-08 PROCEDURE — 80048 BASIC METABOLIC PNL TOTAL CA: CPT | Performed by: INTERNAL MEDICINE

## 2020-06-08 PROCEDURE — 85730 THROMBOPLASTIN TIME PARTIAL: CPT | Performed by: INTERNAL MEDICINE

## 2020-06-08 PROCEDURE — 85014 HEMATOCRIT: CPT | Performed by: INTERNAL MEDICINE

## 2020-06-08 RX ADMIN — DIPHENHYDRAMINE HCL 12.5 MG: 25 TABLET, COATED ORAL at 16:34

## 2020-06-08 RX ADMIN — MELATONIN 3 MG: 3 TAB ORAL at 21:45

## 2020-06-08 RX ADMIN — HEPARIN SODIUM 14 UNITS/KG/HR: 10000 INJECTION, SOLUTION INTRAVENOUS at 16:26

## 2020-06-08 RX ADMIN — TRAZODONE HYDROCHLORIDE 50 MG: 50 TABLET ORAL at 21:45

## 2020-06-08 RX ADMIN — LEVOTHYROXINE SODIUM 25 MCG: 25 TABLET ORAL at 05:53

## 2020-06-08 RX ADMIN — SENNOSIDES AND DOCUSATE SODIUM 1 TABLET: 8.6; 5 TABLET ORAL at 21:46

## 2020-06-08 RX ADMIN — HEPARIN SODIUM 10 UNITS/KG/HR: 10000 INJECTION, SOLUTION INTRAVENOUS at 08:41

## 2020-06-08 RX ADMIN — GABAPENTIN 100 MG: 100 CAPSULE ORAL at 17:29

## 2020-06-08 RX ADMIN — WARFARIN SODIUM 2.5 MG: 2.5 TABLET ORAL at 17:29

## 2020-06-08 RX ADMIN — HEPARIN SODIUM 14 UNITS/KG/HR: 10000 INJECTION, SOLUTION INTRAVENOUS at 20:29

## 2020-06-08 RX ADMIN — VENLAFAXINE 75 MG: 37.5 TABLET ORAL at 17:29

## 2020-06-08 RX ADMIN — VENLAFAXINE 75 MG: 37.5 TABLET ORAL at 08:03

## 2020-06-08 RX ADMIN — AMIODARONE HYDROCHLORIDE 200 MG: 200 TABLET ORAL at 08:03

## 2020-06-08 RX ADMIN — ACETAMINOPHEN 650 MG: 325 TABLET, FILM COATED ORAL at 12:02

## 2020-06-08 RX ADMIN — LORAZEPAM 1 MG: 1 TABLET ORAL at 21:46

## 2020-06-08 RX ADMIN — GABAPENTIN 100 MG: 100 CAPSULE ORAL at 08:03

## 2020-06-09 LAB
APTT PPP: 32 SECONDS (ref 23–37)
APTT PPP: 56 SECONDS (ref 23–37)
APTT PPP: 96 SECONDS (ref 23–37)
ERYTHROCYTE [DISTWIDTH] IN BLOOD BY AUTOMATED COUNT: 15.9 % (ref 11.6–15.1)
HCT VFR BLD AUTO: 31.6 % (ref 34.8–46.1)
HGB BLD-MCNC: 9.7 G/DL (ref 11.5–15.4)
INR PPP: 1.66 (ref 0.84–1.19)
MCH RBC QN AUTO: 31.6 PG (ref 26.8–34.3)
MCHC RBC AUTO-ENTMCNC: 30.7 G/DL (ref 31.4–37.4)
MCV RBC AUTO: 103 FL (ref 82–98)
PLATELET # BLD AUTO: 460 THOUSANDS/UL (ref 149–390)
PMV BLD AUTO: 10.4 FL (ref 8.9–12.7)
PROTHROMBIN TIME: 19.7 SECONDS (ref 11.6–14.5)
RBC # BLD AUTO: 3.07 MILLION/UL (ref 3.81–5.12)
WBC # BLD AUTO: 8.28 THOUSAND/UL (ref 4.31–10.16)

## 2020-06-09 PROCEDURE — 85730 THROMBOPLASTIN TIME PARTIAL: CPT | Performed by: INTERNAL MEDICINE

## 2020-06-09 PROCEDURE — 85610 PROTHROMBIN TIME: CPT | Performed by: INTERNAL MEDICINE

## 2020-06-09 PROCEDURE — 85027 COMPLETE CBC AUTOMATED: CPT | Performed by: INTERNAL MEDICINE

## 2020-06-09 PROCEDURE — 99232 SBSQ HOSP IP/OBS MODERATE 35: CPT | Performed by: INTERNAL MEDICINE

## 2020-06-09 RX ORDER — LORAZEPAM 1 MG/1
2 TABLET ORAL
Status: DISCONTINUED | OUTPATIENT
Start: 2020-06-09 | End: 2020-06-14 | Stop reason: HOSPADM

## 2020-06-09 RX ORDER — WARFARIN SODIUM 3 MG/1
3 TABLET ORAL
Status: DISCONTINUED | OUTPATIENT
Start: 2020-06-09 | End: 2020-06-10

## 2020-06-09 RX ADMIN — DIPHENHYDRAMINE HCL 12.5 MG: 25 TABLET, COATED ORAL at 19:05

## 2020-06-09 RX ADMIN — GABAPENTIN 100 MG: 100 CAPSULE ORAL at 08:56

## 2020-06-09 RX ADMIN — LORAZEPAM 2 MG: 1 TABLET ORAL at 21:18

## 2020-06-09 RX ADMIN — GABAPENTIN 100 MG: 100 CAPSULE ORAL at 19:03

## 2020-06-09 RX ADMIN — VENLAFAXINE 75 MG: 37.5 TABLET ORAL at 08:56

## 2020-06-09 RX ADMIN — WARFARIN SODIUM 3 MG: 3 TABLET ORAL at 19:02

## 2020-06-09 RX ADMIN — OXYCODONE HYDROCHLORIDE 5 MG: 5 TABLET ORAL at 01:50

## 2020-06-09 RX ADMIN — HEPARIN SODIUM 12 UNITS/KG/HR: 10000 INJECTION, SOLUTION INTRAVENOUS at 07:30

## 2020-06-09 RX ADMIN — MELATONIN 3 MG: 3 TAB ORAL at 21:18

## 2020-06-09 RX ADMIN — LEVOTHYROXINE SODIUM 25 MCG: 25 TABLET ORAL at 05:30

## 2020-06-09 RX ADMIN — SENNOSIDES AND DOCUSATE SODIUM 1 TABLET: 8.6; 5 TABLET ORAL at 21:18

## 2020-06-09 RX ADMIN — VENLAFAXINE 75 MG: 37.5 TABLET ORAL at 19:03

## 2020-06-09 RX ADMIN — TRAZODONE HYDROCHLORIDE 50 MG: 50 TABLET ORAL at 21:18

## 2020-06-09 RX ADMIN — AMIODARONE HYDROCHLORIDE 200 MG: 200 TABLET ORAL at 08:56

## 2020-06-10 LAB
APTT PPP: 118 SECONDS (ref 23–37)
APTT PPP: 42 SECONDS (ref 23–37)
APTT PPP: 94 SECONDS (ref 23–37)
ERYTHROCYTE [DISTWIDTH] IN BLOOD BY AUTOMATED COUNT: 16 % (ref 11.6–15.1)
HCT VFR BLD AUTO: 29.1 % (ref 34.8–46.1)
HGB BLD-MCNC: 9.1 G/DL (ref 11.5–15.4)
INR PPP: 1.71 (ref 0.84–1.19)
MCH RBC QN AUTO: 32.4 PG (ref 26.8–34.3)
MCHC RBC AUTO-ENTMCNC: 31.3 G/DL (ref 31.4–37.4)
MCV RBC AUTO: 104 FL (ref 82–98)
PLATELET # BLD AUTO: 385 THOUSANDS/UL (ref 149–390)
PMV BLD AUTO: 9.5 FL (ref 8.9–12.7)
PROTHROMBIN TIME: 20.2 SECONDS (ref 11.6–14.5)
RBC # BLD AUTO: 2.81 MILLION/UL (ref 3.81–5.12)
WBC # BLD AUTO: 6 THOUSAND/UL (ref 4.31–10.16)

## 2020-06-10 PROCEDURE — 85027 COMPLETE CBC AUTOMATED: CPT | Performed by: INTERNAL MEDICINE

## 2020-06-10 PROCEDURE — 85610 PROTHROMBIN TIME: CPT | Performed by: INTERNAL MEDICINE

## 2020-06-10 PROCEDURE — 99232 SBSQ HOSP IP/OBS MODERATE 35: CPT | Performed by: INTERNAL MEDICINE

## 2020-06-10 PROCEDURE — 85730 THROMBOPLASTIN TIME PARTIAL: CPT | Performed by: INTERNAL MEDICINE

## 2020-06-10 RX ORDER — POLYETHYLENE GLYCOL 3350 17 G/17G
17 POWDER, FOR SOLUTION ORAL DAILY PRN
Status: DISCONTINUED | OUTPATIENT
Start: 2020-06-10 | End: 2020-06-14 | Stop reason: HOSPADM

## 2020-06-10 RX ORDER — WARFARIN SODIUM 4 MG/1
4 TABLET ORAL
Status: DISCONTINUED | OUTPATIENT
Start: 2020-06-10 | End: 2020-06-11

## 2020-06-10 RX ADMIN — GABAPENTIN 100 MG: 100 CAPSULE ORAL at 08:02

## 2020-06-10 RX ADMIN — AMIODARONE HYDROCHLORIDE 200 MG: 200 TABLET ORAL at 08:02

## 2020-06-10 RX ADMIN — HEPARIN SODIUM 14 UNITS/KG/HR: 10000 INJECTION, SOLUTION INTRAVENOUS at 00:55

## 2020-06-10 RX ADMIN — SENNOSIDES AND DOCUSATE SODIUM 1 TABLET: 8.6; 5 TABLET ORAL at 21:08

## 2020-06-10 RX ADMIN — VENLAFAXINE 75 MG: 37.5 TABLET ORAL at 17:35

## 2020-06-10 RX ADMIN — MELATONIN 3 MG: 3 TAB ORAL at 21:08

## 2020-06-10 RX ADMIN — VENLAFAXINE 75 MG: 37.5 TABLET ORAL at 08:02

## 2020-06-10 RX ADMIN — LORAZEPAM 2 MG: 1 TABLET ORAL at 21:08

## 2020-06-10 RX ADMIN — GABAPENTIN 100 MG: 100 CAPSULE ORAL at 17:35

## 2020-06-10 RX ADMIN — TRAZODONE HYDROCHLORIDE 50 MG: 50 TABLET ORAL at 21:08

## 2020-06-10 RX ADMIN — LEVOTHYROXINE SODIUM 25 MCG: 25 TABLET ORAL at 05:07

## 2020-06-10 RX ADMIN — WARFARIN SODIUM 4 MG: 4 TABLET ORAL at 17:35

## 2020-06-11 PROBLEM — K59.00 CONSTIPATION: Status: ACTIVE | Noted: 2020-06-11

## 2020-06-11 LAB
APTT PPP: 71 SECONDS (ref 23–37)
APTT PPP: 84 SECONDS (ref 23–37)
APTT PPP: 95 SECONDS (ref 23–37)
ERYTHROCYTE [DISTWIDTH] IN BLOOD BY AUTOMATED COUNT: 15.9 % (ref 11.6–15.1)
HCT VFR BLD AUTO: 34 % (ref 34.8–46.1)
HGB BLD-MCNC: 10.7 G/DL (ref 11.5–15.4)
INR PPP: 1.8 (ref 0.84–1.19)
MCH RBC QN AUTO: 32 PG (ref 26.8–34.3)
MCHC RBC AUTO-ENTMCNC: 31.5 G/DL (ref 31.4–37.4)
MCV RBC AUTO: 102 FL (ref 82–98)
PLATELET # BLD AUTO: 430 THOUSANDS/UL (ref 149–390)
PMV BLD AUTO: 9.2 FL (ref 8.9–12.7)
PROTHROMBIN TIME: 21 SECONDS (ref 11.6–14.5)
RBC # BLD AUTO: 3.34 MILLION/UL (ref 3.81–5.12)
WBC # BLD AUTO: 8.7 THOUSAND/UL (ref 4.31–10.16)

## 2020-06-11 PROCEDURE — 85730 THROMBOPLASTIN TIME PARTIAL: CPT | Performed by: INTERNAL MEDICINE

## 2020-06-11 PROCEDURE — 85027 COMPLETE CBC AUTOMATED: CPT | Performed by: INTERNAL MEDICINE

## 2020-06-11 PROCEDURE — 85610 PROTHROMBIN TIME: CPT | Performed by: INTERNAL MEDICINE

## 2020-06-11 PROCEDURE — 99232 SBSQ HOSP IP/OBS MODERATE 35: CPT | Performed by: INTERNAL MEDICINE

## 2020-06-11 RX ORDER — BISACODYL 10 MG
10 SUPPOSITORY, RECTAL RECTAL ONCE
Status: COMPLETED | OUTPATIENT
Start: 2020-06-11 | End: 2020-06-11

## 2020-06-11 RX ORDER — WARFARIN SODIUM 5 MG/1
5 TABLET ORAL
Status: DISCONTINUED | OUTPATIENT
Start: 2020-06-11 | End: 2020-06-14 | Stop reason: HOSPADM

## 2020-06-11 RX ADMIN — LORAZEPAM 2 MG: 1 TABLET ORAL at 21:33

## 2020-06-11 RX ADMIN — HEPARIN SODIUM 13 UNITS/KG/HR: 10000 INJECTION, SOLUTION INTRAVENOUS at 00:27

## 2020-06-11 RX ADMIN — WARFARIN SODIUM 5 MG: 5 TABLET ORAL at 17:31

## 2020-06-11 RX ADMIN — TRAZODONE HYDROCHLORIDE 50 MG: 50 TABLET ORAL at 21:33

## 2020-06-11 RX ADMIN — GABAPENTIN 100 MG: 100 CAPSULE ORAL at 08:09

## 2020-06-11 RX ADMIN — LEVOTHYROXINE SODIUM 25 MCG: 25 TABLET ORAL at 06:01

## 2020-06-11 RX ADMIN — VENLAFAXINE 75 MG: 37.5 TABLET ORAL at 08:08

## 2020-06-11 RX ADMIN — OXYCODONE HYDROCHLORIDE 5 MG: 5 TABLET ORAL at 21:35

## 2020-06-11 RX ADMIN — GABAPENTIN 100 MG: 100 CAPSULE ORAL at 17:31

## 2020-06-11 RX ADMIN — SENNOSIDES AND DOCUSATE SODIUM 1 TABLET: 8.6; 5 TABLET ORAL at 21:33

## 2020-06-11 RX ADMIN — VENLAFAXINE 75 MG: 37.5 TABLET ORAL at 17:30

## 2020-06-11 RX ADMIN — OXYCODONE HYDROCHLORIDE 5 MG: 5 TABLET ORAL at 01:56

## 2020-06-11 RX ADMIN — BISACODYL 10 MG: 10 SUPPOSITORY RECTAL at 09:23

## 2020-06-11 RX ADMIN — AMIODARONE HYDROCHLORIDE 200 MG: 200 TABLET ORAL at 08:09

## 2020-06-11 RX ADMIN — MELATONIN 3 MG: 3 TAB ORAL at 21:33

## 2020-06-12 LAB
ANION GAP SERPL CALCULATED.3IONS-SCNC: 10 MMOL/L (ref 4–13)
APTT PPP: 67 SECONDS (ref 23–37)
APTT PPP: 81 SECONDS (ref 23–37)
BASOPHILS # BLD AUTO: 0.13 THOUSANDS/ΜL (ref 0–0.1)
BASOPHILS NFR BLD AUTO: 2 % (ref 0–1)
BUN SERPL-MCNC: 12 MG/DL (ref 5–25)
CALCIUM SERPL-MCNC: 8.8 MG/DL (ref 8.3–10.1)
CHLORIDE SERPL-SCNC: 103 MMOL/L (ref 100–108)
CO2 SERPL-SCNC: 27 MMOL/L (ref 21–32)
CREAT SERPL-MCNC: 0.84 MG/DL (ref 0.6–1.3)
EOSINOPHIL # BLD AUTO: 0.21 THOUSAND/ΜL (ref 0–0.61)
EOSINOPHIL NFR BLD AUTO: 4 % (ref 0–6)
ERYTHROCYTE [DISTWIDTH] IN BLOOD BY AUTOMATED COUNT: 16.7 % (ref 11.6–15.1)
GFR SERPL CREATININE-BSD FRML MDRD: 69 ML/MIN/1.73SQ M
GLUCOSE SERPL-MCNC: 102 MG/DL (ref 65–140)
HCT VFR BLD AUTO: 32.2 % (ref 34.8–46.1)
HGB BLD-MCNC: 10.2 G/DL (ref 11.5–15.4)
IMM GRANULOCYTES # BLD AUTO: 0.14 THOUSAND/UL (ref 0–0.2)
IMM GRANULOCYTES NFR BLD AUTO: 2 % (ref 0–2)
INR PPP: 1.91 (ref 0.84–1.19)
LYMPHOCYTES # BLD AUTO: 1.75 THOUSANDS/ΜL (ref 0.6–4.47)
LYMPHOCYTES NFR BLD AUTO: 29 % (ref 14–44)
MCH RBC QN AUTO: 32.6 PG (ref 26.8–34.3)
MCHC RBC AUTO-ENTMCNC: 31.7 G/DL (ref 31.4–37.4)
MCV RBC AUTO: 103 FL (ref 82–98)
MONOCYTES # BLD AUTO: 0.59 THOUSAND/ΜL (ref 0.17–1.22)
MONOCYTES NFR BLD AUTO: 10 % (ref 4–12)
NEUTROPHILS # BLD AUTO: 3.23 THOUSANDS/ΜL (ref 1.85–7.62)
NEUTS SEG NFR BLD AUTO: 53 % (ref 43–75)
NRBC BLD AUTO-RTO: 0 /100 WBCS
PLATELET # BLD AUTO: 389 THOUSANDS/UL (ref 149–390)
PMV BLD AUTO: 9.2 FL (ref 8.9–12.7)
POTASSIUM SERPL-SCNC: 3.8 MMOL/L (ref 3.5–5.3)
PROTHROMBIN TIME: 22.1 SECONDS (ref 11.6–14.5)
RBC # BLD AUTO: 3.13 MILLION/UL (ref 3.81–5.12)
SODIUM SERPL-SCNC: 140 MMOL/L (ref 136–145)
WBC # BLD AUTO: 6.05 THOUSAND/UL (ref 4.31–10.16)

## 2020-06-12 PROCEDURE — 80048 BASIC METABOLIC PNL TOTAL CA: CPT | Performed by: INTERNAL MEDICINE

## 2020-06-12 PROCEDURE — 85730 THROMBOPLASTIN TIME PARTIAL: CPT | Performed by: INTERNAL MEDICINE

## 2020-06-12 PROCEDURE — 99232 SBSQ HOSP IP/OBS MODERATE 35: CPT | Performed by: INTERNAL MEDICINE

## 2020-06-12 PROCEDURE — 85610 PROTHROMBIN TIME: CPT | Performed by: INTERNAL MEDICINE

## 2020-06-12 PROCEDURE — 85025 COMPLETE CBC W/AUTO DIFF WBC: CPT | Performed by: INTERNAL MEDICINE

## 2020-06-12 RX ADMIN — MELATONIN 3 MG: 3 TAB ORAL at 21:26

## 2020-06-12 RX ADMIN — SENNOSIDES AND DOCUSATE SODIUM 1 TABLET: 8.6; 5 TABLET ORAL at 21:26

## 2020-06-12 RX ADMIN — TRAZODONE HYDROCHLORIDE 50 MG: 50 TABLET ORAL at 21:26

## 2020-06-12 RX ADMIN — GABAPENTIN 100 MG: 100 CAPSULE ORAL at 08:26

## 2020-06-12 RX ADMIN — VENLAFAXINE 75 MG: 37.5 TABLET ORAL at 17:21

## 2020-06-12 RX ADMIN — POLYETHYLENE GLYCOL 3350 17 G: 17 POWDER, FOR SOLUTION ORAL at 08:31

## 2020-06-12 RX ADMIN — GABAPENTIN 100 MG: 100 CAPSULE ORAL at 17:21

## 2020-06-12 RX ADMIN — VENLAFAXINE 75 MG: 37.5 TABLET ORAL at 08:27

## 2020-06-12 RX ADMIN — WARFARIN SODIUM 5 MG: 5 TABLET ORAL at 17:21

## 2020-06-12 RX ADMIN — HEPARIN SODIUM 11 UNITS/KG/HR: 10000 INJECTION, SOLUTION INTRAVENOUS at 02:02

## 2020-06-12 RX ADMIN — AMIODARONE HYDROCHLORIDE 200 MG: 200 TABLET ORAL at 08:27

## 2020-06-12 RX ADMIN — LORAZEPAM 2 MG: 1 TABLET ORAL at 21:26

## 2020-06-12 RX ADMIN — LEVOTHYROXINE SODIUM 25 MCG: 25 TABLET ORAL at 05:08

## 2020-06-13 LAB
APTT PPP: 70 SECONDS (ref 23–37)
INR PPP: 2.34 (ref 0.84–1.19)
PROTHROMBIN TIME: 25.9 SECONDS (ref 11.6–14.5)

## 2020-06-13 PROCEDURE — 99232 SBSQ HOSP IP/OBS MODERATE 35: CPT | Performed by: INTERNAL MEDICINE

## 2020-06-13 PROCEDURE — 85730 THROMBOPLASTIN TIME PARTIAL: CPT | Performed by: INTERNAL MEDICINE

## 2020-06-13 PROCEDURE — 85610 PROTHROMBIN TIME: CPT | Performed by: INTERNAL MEDICINE

## 2020-06-13 RX ADMIN — OXYCODONE HYDROCHLORIDE 5 MG: 5 TABLET ORAL at 17:45

## 2020-06-13 RX ADMIN — AMIODARONE HYDROCHLORIDE 200 MG: 200 TABLET ORAL at 09:04

## 2020-06-13 RX ADMIN — VENLAFAXINE 75 MG: 37.5 TABLET ORAL at 09:04

## 2020-06-13 RX ADMIN — LORAZEPAM 2 MG: 1 TABLET ORAL at 21:21

## 2020-06-13 RX ADMIN — LEVOTHYROXINE SODIUM 25 MCG: 25 TABLET ORAL at 06:47

## 2020-06-13 RX ADMIN — TRAZODONE HYDROCHLORIDE 50 MG: 50 TABLET ORAL at 21:21

## 2020-06-13 RX ADMIN — MELATONIN 3 MG: 3 TAB ORAL at 21:21

## 2020-06-13 RX ADMIN — OXYCODONE HYDROCHLORIDE 5 MG: 5 TABLET ORAL at 02:21

## 2020-06-13 RX ADMIN — GABAPENTIN 100 MG: 100 CAPSULE ORAL at 09:04

## 2020-06-13 RX ADMIN — VENLAFAXINE 75 MG: 37.5 TABLET ORAL at 17:42

## 2020-06-13 RX ADMIN — WARFARIN SODIUM 5 MG: 5 TABLET ORAL at 17:42

## 2020-06-13 RX ADMIN — HEPARIN SODIUM 11 UNITS/KG/HR: 10000 INJECTION, SOLUTION INTRAVENOUS at 06:48

## 2020-06-13 RX ADMIN — GABAPENTIN 100 MG: 100 CAPSULE ORAL at 17:42

## 2020-06-13 RX ADMIN — OXYCODONE HYDROCHLORIDE 5 MG: 5 TABLET ORAL at 09:04

## 2020-06-13 RX ADMIN — SENNOSIDES AND DOCUSATE SODIUM 1 TABLET: 8.6; 5 TABLET ORAL at 21:21

## 2020-06-14 VITALS
SYSTOLIC BLOOD PRESSURE: 120 MMHG | DIASTOLIC BLOOD PRESSURE: 63 MMHG | TEMPERATURE: 98 F | OXYGEN SATURATION: 97 % | WEIGHT: 163.8 LBS | HEIGHT: 65 IN | BODY MASS INDEX: 27.29 KG/M2 | RESPIRATION RATE: 18 BRPM | HEART RATE: 70 BPM

## 2020-06-14 LAB
APTT PPP: 94 SECONDS (ref 23–37)
BASOPHILS # BLD AUTO: 0.15 THOUSANDS/ΜL (ref 0–0.1)
BASOPHILS NFR BLD AUTO: 2 % (ref 0–1)
EOSINOPHIL # BLD AUTO: 0.21 THOUSAND/ΜL (ref 0–0.61)
EOSINOPHIL NFR BLD AUTO: 3 % (ref 0–6)
ERYTHROCYTE [DISTWIDTH] IN BLOOD BY AUTOMATED COUNT: 16.8 % (ref 11.6–15.1)
HCT VFR BLD AUTO: 32.1 % (ref 34.8–46.1)
HGB BLD-MCNC: 10 G/DL (ref 11.5–15.4)
IMM GRANULOCYTES # BLD AUTO: 0.12 THOUSAND/UL (ref 0–0.2)
IMM GRANULOCYTES NFR BLD AUTO: 2 % (ref 0–2)
INR PPP: 2.8 (ref 0.84–1.19)
LYMPHOCYTES # BLD AUTO: 1.93 THOUSANDS/ΜL (ref 0.6–4.47)
LYMPHOCYTES NFR BLD AUTO: 31 % (ref 14–44)
MCH RBC QN AUTO: 32.7 PG (ref 26.8–34.3)
MCHC RBC AUTO-ENTMCNC: 31.2 G/DL (ref 31.4–37.4)
MCV RBC AUTO: 105 FL (ref 82–98)
MONOCYTES # BLD AUTO: 0.7 THOUSAND/ΜL (ref 0.17–1.22)
MONOCYTES NFR BLD AUTO: 11 % (ref 4–12)
NEUTROPHILS # BLD AUTO: 3.12 THOUSANDS/ΜL (ref 1.85–7.62)
NEUTS SEG NFR BLD AUTO: 51 % (ref 43–75)
NRBC BLD AUTO-RTO: 0 /100 WBCS
PLATELET # BLD AUTO: 369 THOUSANDS/UL (ref 149–390)
PMV BLD AUTO: 9.4 FL (ref 8.9–12.7)
PROTHROMBIN TIME: 29.9 SECONDS (ref 11.6–14.5)
RBC # BLD AUTO: 3.06 MILLION/UL (ref 3.81–5.12)
WBC # BLD AUTO: 6.23 THOUSAND/UL (ref 4.31–10.16)

## 2020-06-14 PROCEDURE — 85025 COMPLETE CBC W/AUTO DIFF WBC: CPT | Performed by: INTERNAL MEDICINE

## 2020-06-14 PROCEDURE — 99239 HOSP IP/OBS DSCHRG MGMT >30: CPT | Performed by: INTERNAL MEDICINE

## 2020-06-14 PROCEDURE — 85610 PROTHROMBIN TIME: CPT | Performed by: INTERNAL MEDICINE

## 2020-06-14 PROCEDURE — 85730 THROMBOPLASTIN TIME PARTIAL: CPT | Performed by: INTERNAL MEDICINE

## 2020-06-14 RX ORDER — WARFARIN SODIUM 2.5 MG/1
TABLET ORAL
Refills: 0
Start: 2020-06-14

## 2020-06-14 RX ORDER — POLYETHYLENE GLYCOL 3350 17 G/17G
17 POWDER, FOR SOLUTION ORAL DAILY PRN
Qty: 14 EACH | Refills: 0 | Status: SHIPPED | OUTPATIENT
Start: 2020-06-14 | End: 2020-09-18 | Stop reason: ALTCHOICE

## 2020-06-14 RX ORDER — AMOXICILLIN 250 MG
1 CAPSULE ORAL
Qty: 30 TABLET | Refills: 0 | Status: SHIPPED | OUTPATIENT
Start: 2020-06-14 | End: 2020-09-18 | Stop reason: ALTCHOICE

## 2020-06-14 RX ORDER — OXYCODONE HYDROCHLORIDE 5 MG/1
5 TABLET ORAL EVERY 6 HOURS PRN
Qty: 15 TABLET | Refills: 0 | Status: SHIPPED | OUTPATIENT
Start: 2020-06-14 | End: 2020-06-19

## 2020-06-14 RX ORDER — WARFARIN SODIUM 5 MG/1
5 TABLET ORAL SEE ADMIN INSTRUCTIONS
Qty: 30 TABLET | Refills: 0 | Status: SHIPPED | OUTPATIENT
Start: 2020-06-14 | End: 2022-03-10

## 2020-06-14 RX ORDER — ACETAMINOPHEN 325 MG/1
650 TABLET ORAL EVERY 6 HOURS PRN
Qty: 30 TABLET | Refills: 0 | Status: SHIPPED | OUTPATIENT
Start: 2020-06-14

## 2020-06-14 RX ADMIN — LEVOTHYROXINE SODIUM 25 MCG: 25 TABLET ORAL at 05:18

## 2020-06-14 RX ADMIN — AMIODARONE HYDROCHLORIDE 200 MG: 200 TABLET ORAL at 08:59

## 2020-06-14 RX ADMIN — VENLAFAXINE 75 MG: 37.5 TABLET ORAL at 08:59

## 2020-06-14 RX ADMIN — GABAPENTIN 100 MG: 100 CAPSULE ORAL at 08:59
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
## 2020-06-16 RX ORDER — METHYLPREDNISOLONE 4 MG/1
TABLET ORAL
COMMUNITY
Start: 2020-03-06 | End: 2020-06-18 | Stop reason: ALTCHOICE

## 2020-06-16 RX ORDER — ACETAMINOPHEN AND CODEINE PHOSPHATE 300; 30 MG/1; MG/1
1 TABLET ORAL
COMMUNITY
Start: 2020-01-02 | End: 2020-06-18

## 2020-06-16 RX ORDER — MEMANTINE HYDROCHLORIDE 10 MG/1
TABLET ORAL
COMMUNITY
Start: 2019-05-14 | End: 2020-07-23

## 2020-06-16 RX ORDER — DONEPEZIL HYDROCHLORIDE 5 MG/1
TABLET, FILM COATED ORAL
COMMUNITY
Start: 2019-04-02 | End: 2020-06-18 | Stop reason: ALTCHOICE

## 2020-06-16 RX ORDER — CLINDAMYCIN HYDROCHLORIDE 300 MG/1
CAPSULE ORAL
COMMUNITY
Start: 2020-02-24 | End: 2020-06-18 | Stop reason: ALTCHOICE

## 2020-06-16 RX ORDER — DOXYCYCLINE HYCLATE 100 MG/1
CAPSULE ORAL
COMMUNITY
Start: 2019-05-07 | End: 2020-06-18 | Stop reason: ALTCHOICE

## 2020-06-18 ENCOUNTER — OFFICE VISIT (OUTPATIENT)
Dept: FAMILY MEDICINE CLINIC | Facility: CLINIC | Age: 75
End: 2020-06-18
Payer: MEDICARE

## 2020-06-18 ENCOUNTER — OFFICE VISIT (OUTPATIENT)
Dept: GASTROENTEROLOGY | Facility: CLINIC | Age: 75
End: 2020-06-18
Payer: MEDICARE

## 2020-06-18 VITALS
BODY MASS INDEX: 26.49 KG/M2 | DIASTOLIC BLOOD PRESSURE: 70 MMHG | HEART RATE: 88 BPM | WEIGHT: 159 LBS | TEMPERATURE: 98 F | SYSTOLIC BLOOD PRESSURE: 144 MMHG | HEIGHT: 65 IN

## 2020-06-18 VITALS
HEIGHT: 65 IN | WEIGHT: 160.8 LBS | SYSTOLIC BLOOD PRESSURE: 118 MMHG | HEART RATE: 93 BPM | DIASTOLIC BLOOD PRESSURE: 74 MMHG | BODY MASS INDEX: 26.79 KG/M2 | OXYGEN SATURATION: 93 % | TEMPERATURE: 98 F

## 2020-06-18 DIAGNOSIS — M79.89 ARM SWELLING: ICD-10-CM

## 2020-06-18 DIAGNOSIS — Z11.59 ENCOUNTER FOR HEPATITIS C SCREENING TEST FOR LOW RISK PATIENT: ICD-10-CM

## 2020-06-18 DIAGNOSIS — M48.56XD COMPRESSION FRACTURE OF LUMBAR SPINE, NON-TRAUMATIC, WITH ROUTINE HEALING, SUBSEQUENT ENCOUNTER: ICD-10-CM

## 2020-06-18 DIAGNOSIS — M54.41 CHRONIC MIDLINE LOW BACK PAIN WITH RIGHT-SIDED SCIATICA: ICD-10-CM

## 2020-06-18 DIAGNOSIS — K57.92 DIVERTICULITIS: ICD-10-CM

## 2020-06-18 DIAGNOSIS — Z12.11 SCREENING FOR COLON CANCER: ICD-10-CM

## 2020-06-18 DIAGNOSIS — G89.29 CHRONIC MIDLINE LOW BACK PAIN WITH RIGHT-SIDED SCIATICA: ICD-10-CM

## 2020-06-18 DIAGNOSIS — E03.8 OTHER SPECIFIED HYPOTHYROIDISM: ICD-10-CM

## 2020-06-18 DIAGNOSIS — F41.9 ANXIETY: ICD-10-CM

## 2020-06-18 DIAGNOSIS — D62 ACUTE BLOOD LOSS ANEMIA: ICD-10-CM

## 2020-06-18 DIAGNOSIS — K86.9 PANCREATIC LESION: Primary | ICD-10-CM

## 2020-06-18 DIAGNOSIS — Z12.39 SCREENING FOR BREAST CANCER: ICD-10-CM

## 2020-06-18 DIAGNOSIS — R22.41 LUMP OF SKIN OF RIGHT LOWER EXTREMITY: ICD-10-CM

## 2020-06-18 DIAGNOSIS — Z80.0 FAMILY HISTORY OF COLON CANCER: ICD-10-CM

## 2020-06-18 DIAGNOSIS — I48.91 ATRIAL FIBRILLATION, UNSPECIFIED TYPE (HCC): ICD-10-CM

## 2020-06-18 DIAGNOSIS — Z95.2 H/O PROSTHETIC HEART VALVE: ICD-10-CM

## 2020-06-18 DIAGNOSIS — Z13.220 SCREENING FOR HYPERLIPIDEMIA: ICD-10-CM

## 2020-06-18 DIAGNOSIS — Z00.00 MEDICARE ANNUAL WELLNESS VISIT, SUBSEQUENT: ICD-10-CM

## 2020-06-18 DIAGNOSIS — Z85.3 HISTORY OF BREAST CANCER: ICD-10-CM

## 2020-06-18 DIAGNOSIS — S40.021A ARM CONTUSION, RIGHT, INITIAL ENCOUNTER: Primary | ICD-10-CM

## 2020-06-18 DIAGNOSIS — H61.23 BILATERAL IMPACTED CERUMEN: ICD-10-CM
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
## 2020-06-18 PROBLEM — S32.010D COMPRESSION FRACTURE OF L1 VERTEBRA WITH ROUTINE HEALING: Status: ACTIVE | Noted: 2020-06-18

## 2020-06-18 PROBLEM — M19.90 ARTHRITIS: Status: ACTIVE | Noted: 2020-06-18

## 2020-06-18 PROCEDURE — 1170F FXNL STATUS ASSESSED: CPT | Performed by: PHYSICIAN ASSISTANT

## 2020-06-18 PROCEDURE — 1160F RVW MEDS BY RX/DR IN RCRD: CPT | Performed by: FAMILY MEDICINE

## 2020-06-18 PROCEDURE — 99203 OFFICE O/P NEW LOW 30 MIN: CPT | Performed by: PHYSICIAN ASSISTANT

## 2020-06-18 PROCEDURE — 1160F RVW MEDS BY RX/DR IN RCRD: CPT | Performed by: PHYSICIAN ASSISTANT

## 2020-06-18 PROCEDURE — 99204 OFFICE O/P NEW MOD 45 MIN: CPT | Performed by: FAMILY MEDICINE

## 2020-06-18 PROCEDURE — 1111F DSCHRG MED/CURRENT MED MERGE: CPT | Performed by: PHYSICIAN ASSISTANT

## 2020-06-18 PROCEDURE — 1111F DSCHRG MED/CURRENT MED MERGE: CPT | Performed by: FAMILY MEDICINE

## 2020-06-18 PROCEDURE — 69210 REMOVE IMPACTED EAR WAX UNI: CPT | Performed by: FAMILY MEDICINE

## 2020-06-18 PROCEDURE — 1036F TOBACCO NON-USER: CPT | Performed by: FAMILY MEDICINE

## 2020-06-18 PROCEDURE — 1125F AMNT PAIN NOTED PAIN PRSNT: CPT | Performed by: FAMILY MEDICINE

## 2020-06-18 PROCEDURE — 1125F AMNT PAIN NOTED PAIN PRSNT: CPT | Performed by: PHYSICIAN ASSISTANT

## 2020-06-18 PROCEDURE — 3008F BODY MASS INDEX DOCD: CPT | Performed by: PHYSICIAN ASSISTANT

## 2020-06-18 PROCEDURE — G0439 PPPS, SUBSEQ VISIT: HCPCS | Performed by: FAMILY MEDICINE

## 2020-06-18 PROCEDURE — 3008F BODY MASS INDEX DOCD: CPT | Performed by: FAMILY MEDICINE

## 2020-06-18 PROCEDURE — 1170F FXNL STATUS ASSESSED: CPT | Performed by: FAMILY MEDICINE

## 2020-06-18 PROCEDURE — 1036F TOBACCO NON-USER: CPT | Performed by: PHYSICIAN ASSISTANT

## 2020-06-18 RX ORDER — POLYETHYLENE GLYCOL 3350 17 G
POWDER IN PACKET (EA) ORAL
COMMUNITY
Start: 2020-06-14

## 2020-06-18 RX ORDER — LANOLIN ALCOHOL/MO/W.PET/CERES
CREAM (GRAM) TOPICAL DAILY
COMMUNITY
End: 2020-06-18 | Stop reason: ALTCHOICE

## 2020-06-18 RX ORDER — OXYCODONE HYDROCHLORIDE AND ACETAMINOPHEN 5; 325 MG/1; MG/1
TABLET ORAL
COMMUNITY
Start: 2020-05-09 | End: 2020-06-18 | Stop reason: ALTCHOICE

## 2020-06-18 RX ORDER — GABAPENTIN 300 MG/1
CAPSULE ORAL
COMMUNITY
Start: 2020-05-14 | End: 2020-06-18 | Stop reason: ALTCHOICE

## 2020-06-26 LAB
CHOLEST SERPL-MCNC: 222 MG/DL (ref 100–199)
HCV AB S/CO SERPL IA: <0.1 S/CO RATIO (ref 0–0.9)
HDLC SERPL-MCNC: 75 MG/DL
LDLC SERPL CALC-MCNC: 123 MG/DL (ref 0–99)
SL AMB VLDL CHOLESTEROL CALC: 24 MG/DL (ref 5–40)
T4 FREE SERPL-MCNC: 1.74 NG/DL (ref 0.82–1.77)
TRIGL SERPL-MCNC: 119 MG/DL (ref 0–149)
TSH SERPL DL<=0.005 MIU/L-ACNC: 2.08 UIU/ML (ref 0.45–4.5)

## 2020-06-27 ENCOUNTER — TELEPHONE (OUTPATIENT)
Dept: OTHER | Facility: OTHER | Age: 75
End: 2020-06-27

## 2020-07-01 ENCOUNTER — CONSULT (OUTPATIENT)
Dept: PAIN MEDICINE | Facility: CLINIC | Age: 75
End: 2020-07-01
Payer: MEDICARE

## 2020-07-01 VITALS
RESPIRATION RATE: 20 BRPM | HEIGHT: 65 IN | WEIGHT: 160.8 LBS | BODY MASS INDEX: 26.79 KG/M2 | HEART RATE: 78 BPM | SYSTOLIC BLOOD PRESSURE: 117 MMHG | DIASTOLIC BLOOD PRESSURE: 73 MMHG

## 2020-07-01 DIAGNOSIS — G89.4 CHRONIC PAIN SYNDROME: Primary | ICD-10-CM

## 2020-07-01 DIAGNOSIS — M25.561 CHRONIC PAIN OF RIGHT KNEE: ICD-10-CM

## 2020-07-01 DIAGNOSIS — M06.9 RHEUMATOID ARTHRITIS INVOLVING MULTIPLE SITES, UNSPECIFIED RHEUMATOID FACTOR PRESENCE: ICD-10-CM

## 2020-07-01 DIAGNOSIS — M54.50 CHRONIC BILATERAL LOW BACK PAIN WITHOUT SCIATICA: ICD-10-CM

## 2020-07-01 DIAGNOSIS — S32.010A CLOSED COMPRESSION FRACTURE OF BODY OF L1 VERTEBRA (HCC): ICD-10-CM

## 2020-07-01 DIAGNOSIS — G89.29 CHRONIC BILATERAL LOW BACK PAIN WITHOUT SCIATICA: ICD-10-CM

## 2020-07-01 DIAGNOSIS — G89.29 CHRONIC PAIN OF RIGHT KNEE: ICD-10-CM

## 2020-07-01 DIAGNOSIS — S32.030S CLOSED COMPRESSION FRACTURE OF L3 LUMBAR VERTEBRA, SEQUELA: ICD-10-CM

## 2020-07-01 DIAGNOSIS — M47.816 LUMBAR SPONDYLOSIS: ICD-10-CM

## 2020-07-01 DIAGNOSIS — S22.070S CLOSED WEDGE COMPRESSION FRACTURE OF NINTH THORACIC VERTEBRA, SEQUELA: ICD-10-CM

## 2020-07-01 PROCEDURE — 99204 OFFICE O/P NEW MOD 45 MIN: CPT | Performed by: ANESTHESIOLOGY

## 2020-07-01 PROCEDURE — 1160F RVW MEDS BY RX/DR IN RCRD: CPT | Performed by: ANESTHESIOLOGY

## 2020-07-01 PROCEDURE — 1036F TOBACCO NON-USER: CPT | Performed by: ANESTHESIOLOGY

## 2020-07-01 PROCEDURE — 1170F FXNL STATUS ASSESSED: CPT | Performed by: ANESTHESIOLOGY

## 2020-07-01 PROCEDURE — 3008F BODY MASS INDEX DOCD: CPT | Performed by: ANESTHESIOLOGY

## 2020-07-01 PROCEDURE — 1111F DSCHRG MED/CURRENT MED MERGE: CPT | Performed by: ANESTHESIOLOGY

## 2020-07-01 NOTE — PROGRESS NOTES
Assessment:  1  Chronic pain syndrome    2  Chronic bilateral low back pain without sciatica    3  Lumbar spondylosis    4  Closed wedge compression fracture of ninth thoracic vertebra, sequela    5  Closed compression fracture of body of L1 vertebra (HCC)    6  Closed compression fracture of L3 lumbar vertebra, sequela    7  Rheumatoid arthritis involving multiple sites, unspecified rheumatoid factor presence ([facility] [address] )    8  Chronic pain of right knee        Plan:  Orders Placed This Encounter   Procedures    XR spine lumbar complete w bending minimum 6 views     Standing Status:   Future     Standing Expiration Date:   7/1/2024     Scheduling Instructions:      Bring along any outside films relating to this procedure   FL spine and pain procedure     Standing Status:   Future     Standing Expiration Date:   7/1/2024     Order Specific Question:   Reason for Exam:     Answer:   Bilateral L3, L4, L5, and S1 medial branch blocks #1 ([id number], [id number], [id number])     Order Specific Question:   Anticoagulant hold needed?      Answer:   unknown    Ambulatory referral to Rheumatology     Standing Status:   Future     Standing Expiration Date:   7/1/2021     Referral Priority:   Routine     Referral Type:   Consult - AMB     Referral Reason:   Specialty Services Required     Referred to Provider:   Ozzie Hart MD     Requested Specialty:   Rheumatology     Number of Visits Requested:   1     Expiration Date:   7/1/2021    Ambulatory referral to Orthopedic Surgery     Standing Status:   Future     Standing Expiration Date:   7/1/2021     Referral Priority:   Routine     Referral Type:   Consult - AMB     Referral Reason:   Specialty Services Required     Referred to Provider:   Elayne Matias MD     Requested Specialty:   Orthopedic Surgery     Number of Visits Requested:   1     Expiration Date:   7/1/2021    Ambulatory referral to Sports Medicine     Standing Status:   Future     Standing Expiration Date: 7/1/2021     Referral Priority:   Routine     Referral Type:   Consult - AMB     Referral Reason:   Specialty Services Required     Referred to Provider:   Jenifer Hsu DO     Requested Specialty:   Sports Medicine     Number of Visits Requested:   1     Expiration Date:   7/1/2021     My impressions and treatment recommendations were discussed in detail with the patient, who verbalized understanding and had no further questions  Given that the patient has signs and symptoms consistent with bilateral lumbar facet syndrome and low back pain, I discussed the rationale of undergoing bilateral L3, L4, L5, and S1 diagnostic and confirmatory medial branch blocks  The procedures, its risks, and benefits were explained in detail to the patient  Risks include but are not limited to bleeding, infection, hematoma formation, abscess formation, weakness, headache, failure the pain to improve, nerve irritation or damage, and potential worsening of the pain  The patient verbalized understanding and wished to proceed with the procedure  In addition, I did feel reasonable to obtain an x-ray of the lumbar spine to evaluate for any other pathology that may be contributing to the patient's pain complaints  The patient also would like a referral to a rheumatologist for her rheumatoid arthritis as well as to be placed possibly on Prolia for her multiple spinal compression fractures  I have referred her to Dr Enedelia Hernandez for this  Since the patient does have multiple compression fractures, she would like to be evaluated by a spinal surgeon to see if she is a candidate for kyphoplasty  As such, I felt it reasonable to have her see Dr Christiano Mendiola for an evaluation to see if she is a candidate for kyphoplasty  The patient also is interested in viscosupplementation injections for her right knee  I felt a reasonable to have her see Dr Jenifer Hsu regarding this      Follow-up is planned in 4 weeks time or sooner as warranted  Discharge instructions were provided  I personally saw and examined the patient and I agree with the above discussed plan of care  History of Present Illness:    Brie Salas is a 69 y o  female who presents to [facility] for initial evaluation of the above stated pain complaints  The patient has a past medical and chronic pain history as outlined in the assessment section  She was referred by Dr Elvira Crum  The patient reports a 5 year history of low back pain as her primary pain complaint  She describes her pain as severe and constant in nature  She reports that her pain is worse in the morning and night  She reports that she had several falls recently which may have precipitated her symptoms  She describes her pain as numbness and sharp  She reports weakness in her lower extremities  She ambulates with the assistance of a cane and walker  She states that physical therapy, home exercises both provided no relief  Heat/ice treatment provided moderate pain relief  Acetaminophen over-the-counter, oral steroids, and naproxen over-the-counter did provide her some pain relief in the past     Review of Systems:    Review of Systems   Constitutional: Negative for fever and unexpected weight change  HENT: Negative for trouble swallowing  Eyes: Negative for visual disturbance  Respiratory: Negative for shortness of breath and wheezing  Cardiovascular: Positive for palpitations  Negative for chest pain  Gastrointestinal: Positive for abdominal pain  Negative for constipation, diarrhea, nausea and vomiting  Endocrine: Negative for cold intolerance, heat intolerance and polydipsia  Genitourinary: Negative for difficulty urinating and frequency  Musculoskeletal: Positive for arthralgias  Negative for gait problem, joint swelling and myalgias  Skin: Negative for rash  Neurological: Negative for dizziness, seizures, syncope, weakness and headaches  Hematological: Does not bruise/bleed easily  Psychiatric/Behavioral: Positive for decreased concentration  Negative for dysphoric mood  All other systems reviewed and are negative          Patient Active Problem List   Diagnosis    Arm contusion, right 2/2 Coumadin toxicity    Arm swelling    Atrial fibrillation (HCC)    H/O prosthetic heart valve    Acute blood loss anemia    Constipation    Compression fracture of lumbar spine, non-traumatic, with routine healing, subsequent encounter    History of breast cancer    Other specified hypothyroidism    Chronic midline low back pain with right-sided sciatica    Lump of skin of right lower extremity    Bilateral impacted cerumen    Medicare annual wellness visit, subsequent    Anxiety    Arthritis    Chronic pain syndrome    Lumbar spondylosis       Past Medical History:   Diagnosis Date    Atrial fibrillation ([facility] [address]     Breast cancer ([facility] [address] )     Diverticulitis-recent 6/5/2020    Tick bite with subsequent removal of tick 5/28/2019    Last Assessment & Plan:  On doxy so no need for lymes testing or additional antibiotic       Past Surgical History:   Procedure Laterality Date    APPENDECTOMY      BREAST SURGERY      L mastectomy    CARDIAC VALVE REPLACEMENT      MV    HYSTERECTOMY      MASTECTOMY      left breast    TONSILLECTOMY         Family History   Problem Relation Age of Onset    Diabetes Mother     Cancer Mother     Colon cancer Father     Colon cancer Paternal Grandmother     Colon cancer Paternal Grandfather        Social History     Occupational History    Not on file   Tobacco Use    Smoking status: Never Smoker    Smokeless tobacco: Never Used   Substance and Sexual Activity    Alcohol use: Yes     Comment: occasionally    Drug use: Never    Sexual activity: Not on file         Current Outpatient Medications:     acetaminophen (TYLENOL) 325 mg tablet, Take 2 tablets (650 mg total) by mouth every 6 (six) hours as needed for mild pain, headaches or fever, Disp: 30 tablet, Rfl: 0    amiodarone 200 mg tablet, Take 200 mg by mouth daily, Disp: , Rfl:     Cholecalciferol (D3 High Potency) 50 MCG (2000 UT) CAPS, Take by mouth, Disp: , Rfl:     diclofenac sodium (VOLTAREN) 1 %, , Disp: , Rfl:     enoxaparin (LOVENOX) 80 mg/0 8 mL, INJECT AS DIRECTED EVERY 12 HOURS SUBCUTANEOUSLY FOR 2 DAYS, Disp: , Rfl:     gabapentin (NEURONTIN) 100 mg capsule, Take 100 mg by mouth 2 (two) times a day, Disp: , Rfl:     levothyroxine 25 mcg tablet, Take 25 mcg by mouth daily, Disp: , Rfl:     LORazepam (ATIVAN) 1 mg tablet, Take 1 mg by mouth daily at bedtime, Disp: , Rfl:     melatonin 1 mg, Take 3 mg by mouth daily at bedtime, Disp: , Rfl:     memantine (NAMENDA) 10 mg tablet, , Disp: , Rfl:     polyethylene glycol (MIRALAX) 17 g packet, Take 17 g by mouth daily as needed (constipation), Disp: 14 each, Rfl: 0    RA LAXATIVE powder, take 17GM (DISSOLVED IN WATER) by mouth once daily if needed for constipation, Disp: , Rfl:     senna-docusate sodium (SENOKOT S) 8 6-50 mg per tablet, Take 1 tablet by mouth daily at bedtime, Disp: 30 tablet, Rfl: 0    traZODone (DESYREL) 50 mg tablet, Take 50 mg by mouth daily at bedtime, Disp: , Rfl:     venlafaxine (EFFEXOR) 75 mg tablet, Take 75 mg by mouth 2 (two) times a day, Disp: , Rfl:     warfarin (COUMADIN) 2 5 mg tablet, Every Monday, Wednesday and Friday, Disp: , Rfl: 0    warfarin (COUMADIN) 5 mg tablet, Take 1 tablet (5 mg total) by mouth see administration instructions Four days in a week  Every  Tuesday, Thursday, Saturday and Sunday, Disp: 30 tablet, Rfl: 0    Allergies   Allergen Reactions    Penicillins Anaphylaxis     Other reaction(s):  Anaphylaxis      Iodides      Other reaction(s): Rash         Physical Exam:    /73   Pulse 78   Resp 20   Ht 5' 5" (1 651 m)   Wt 72 9 kg (160 lb 12 8 oz)   BMI 26 76 kg/m²     Constitutional: normal, well developed, well nourished, alert, in no distress and non-toxic and no overt pain behavior  Eyes: anicteric  HEENT: grossly intact  Neck: supple, symmetric, trachea midline and no masses   Pulmonary:even and unlabored  Cardiovascular:No edema or pitting edema present  Skin:Normal without rashes or lesions and well hydrated  Psychiatric:Mood and affect appropriate  Neurologic:Cranial Nerves II-XII grossly intact  Musculoskeletal:antalgic     Lumbar Spine Exam    Appearance:  Normal lordosis  Palpation/Tenderness:  no tenderness or spasm  Sensory:  no sensory deficits noted  Range of Motion:  Flexion:   Moderately limited  with pain  Extension:  Severely limited  with pain  Lateral Flexion - Left:  Severely limited  with pain  Lateral Flexion - Right:  Severely limited  with pain  Rotation - Left:  Severely limited  with pain  Rotation - Right:  Severely limited  with pain   Lumbar facet loading is positive bilaterally  Motor Strength:  Left hip flexion:  5/5  Left hip extension:  5/5  Right hip flexion:  5/5  Right hip extension:  5/5  Left knee flexion:  5/5  Left knee extension:  5/5  Right knee flexion:  5/5  Right knee extension:  5/5  Left foot dorsiflexion:  5/5  Left foot plantar flexion:  5/5  Right foot dorsiflexion:  5/5  Right foot plantar flexion:  5/5  Reflexes:  Left Patellar:  2+   Right Patellar:  2+   Left Achilles:  2+   Right Achilles:  2+   Special Tests:  Left Straight Leg Test:  negative  Right Straight Leg Test:  negative  Left Lauri's Maneuver:  negative  Right Lauri's Maneuver:  negative    Imaging      XR spine lumbar complete w bending minimum 6 views    (Results Pending)   FL spine and pain procedure    (Results Pending)       Orders Placed This Encounter   Procedures    XR spine lumbar complete w bending minimum 6 views    FL spine and pain procedure    Ambulatory referral to Rheumatology    Ambulatory referral to Orthopedic Surgery    Ambulatory referral to Sports Medicine

## 2020-07-01 NOTE — H&P (VIEW-ONLY)
Assessment:  1  Chronic pain syndrome    2  Chronic bilateral low back pain without sciatica    3  Lumbar spondylosis    4  Closed wedge compression fracture of ninth thoracic vertebra, sequela    5  Closed compression fracture of body of L1 vertebra (HCC)    6  Closed compression fracture of L3 lumbar vertebra, sequela    7  Rheumatoid arthritis involving multiple sites, unspecified rheumatoid factor presence ([facility] [address] )    8  Chronic pain of right knee        Plan:  Orders Placed This Encounter   Procedures    XR spine lumbar complete w bending minimum 6 views     Standing Status:   Future     Standing Expiration Date:   7/1/2024     Scheduling Instructions:      Bring along any outside films relating to this procedure   FL spine and pain procedure     Standing Status:   Future     Standing Expiration Date:   7/1/2024     Order Specific Question:   Reason for Exam:     Answer:   Bilateral L3, L4, L5, and S1 medial branch blocks #1 ([id number], [id number], [id number])     Order Specific Question:   Anticoagulant hold needed?      Answer:   unknown    Ambulatory referral to Rheumatology     Standing Status:   Future     Standing Expiration Date:   7/1/2021     Referral Priority:   Routine     Referral Type:   Consult - AMB     Referral Reason:   Specialty Services Required     Referred to Provider:   Mayra Nguyen MD     Requested Specialty:   Rheumatology     Number of Visits Requested:   1     Expiration Date:   7/1/2021    Ambulatory referral to Orthopedic Surgery     Standing Status:   Future     Standing Expiration Date:   7/1/2021     Referral Priority:   Routine     Referral Type:   Consult - AMB     Referral Reason:   Specialty Services Required     Referred to Provider:   Shaun Ariza MD     Requested Specialty:   Orthopedic Surgery     Number of Visits Requested:   1     Expiration Date:   7/1/2021    Ambulatory referral to Sports Medicine     Standing Status:   Future     Standing Expiration Date: 7/1/2021     Referral Priority:   Routine     Referral Type:   Consult - AMB     Referral Reason:   Specialty Services Required     Referred to Provider:   Mickael Cowden, DO     Requested Specialty:   Sports Medicine     Number of Visits Requested:   1     Expiration Date:   7/1/2021     My impressions and treatment recommendations were discussed in detail with the patient, who verbalized understanding and had no further questions  Given that the patient has signs and symptoms consistent with bilateral lumbar facet syndrome and low back pain, I discussed the rationale of undergoing bilateral L3, L4, L5, and S1 diagnostic and confirmatory medial branch blocks  The procedures, its risks, and benefits were explained in detail to the patient  Risks include but are not limited to bleeding, infection, hematoma formation, abscess formation, weakness, headache, failure the pain to improve, nerve irritation or damage, and potential worsening of the pain  The patient verbalized understanding and wished to proceed with the procedure  In addition, I did feel reasonable to obtain an x-ray of the lumbar spine to evaluate for any other pathology that may be contributing to the patient's pain complaints  The patient also would like a referral to a rheumatologist for her rheumatoid arthritis as well as to be placed possibly on Prolia for her multiple spinal compression fractures  I have referred her to Dr Emilia Armstrong for this  Since the patient does have multiple compression fractures, she would like to be evaluated by a spinal surgeon to see if she is a candidate for kyphoplasty  As such, I felt it reasonable to have her see Dr Kayleigh Marrufo for an evaluation to see if she is a candidate for kyphoplasty  The patient also is interested in viscosupplementation injections for her right knee  I felt a reasonable to have her see Dr Mickael Cowden regarding this      Follow-up is planned in 4 weeks time or sooner as warranted  Discharge instructions were provided  I personally saw and examined the patient and I agree with the above discussed plan of care  History of Present Illness:    Brie Salas is a 69 y o  female who presents to [facility] for initial evaluation of the above stated pain complaints  The patient has a past medical and chronic pain history as outlined in the assessment section  She was referred by Dr Elvira Crum  The patient reports a 5 year history of low back pain as her primary pain complaint  She describes her pain as severe and constant in nature  She reports that her pain is worse in the morning and night  She reports that she had several falls recently which may have precipitated her symptoms  She describes her pain as numbness and sharp  She reports weakness in her lower extremities  She ambulates with the assistance of a cane and walker  She states that physical therapy, home exercises both provided no relief  Heat/ice treatment provided moderate pain relief  Acetaminophen over-the-counter, oral steroids, and naproxen over-the-counter did provide her some pain relief in the past     Review of Systems:    Review of Systems   Constitutional: Negative for fever and unexpected weight change  HENT: Negative for trouble swallowing  Eyes: Negative for visual disturbance  Respiratory: Negative for shortness of breath and wheezing  Cardiovascular: Positive for palpitations  Negative for chest pain  Gastrointestinal: Positive for abdominal pain  Negative for constipation, diarrhea, nausea and vomiting  Endocrine: Negative for cold intolerance, heat intolerance and polydipsia  Genitourinary: Negative for difficulty urinating and frequency  Musculoskeletal: Positive for arthralgias  Negative for gait problem, joint swelling and myalgias  Skin: Negative for rash  Neurological: Negative for dizziness, seizures, syncope, weakness and headaches  Hematological: Does not bruise/bleed easily  Psychiatric/Behavioral: Positive for decreased concentration  Negative for dysphoric mood  All other systems reviewed and are negative          Patient Active Problem List   Diagnosis    Arm contusion, right 2/2 Coumadin toxicity    Arm swelling    Atrial fibrillation (HCC)    H/O prosthetic heart valve    Acute blood loss anemia    Constipation    Compression fracture of lumbar spine, non-traumatic, with routine healing, subsequent encounter    History of breast cancer    Other specified hypothyroidism    Chronic midline low back pain with right-sided sciatica    Lump of skin of right lower extremity    Bilateral impacted cerumen    Medicare annual wellness visit, subsequent    Anxiety    Arthritis    Chronic pain syndrome    Lumbar spondylosis       Past Medical History:   Diagnosis Date    Atrial fibrillation ([facility] [address]     Breast cancer ([facility] [address] [facility]     Diverticulitis-recent 6/5/2020    Tick bite with subsequent removal of tick 5/28/2019    Last Assessment & Plan:  On doxy so no need for lymes testing or additional antibiotic       Past Surgical History:   Procedure Laterality Date    APPENDECTOMY      BREAST SURGERY      L mastectomy    CARDIAC VALVE REPLACEMENT      MV    HYSTERECTOMY      MASTECTOMY      left breast    TONSILLECTOMY         Family History   Problem Relation Age of Onset    Diabetes Mother     Cancer Mother     Colon cancer Father     Colon cancer Paternal Grandmother     Colon cancer Paternal Grandfather        Social History     Occupational History    Not on file   Tobacco Use    Smoking status: Never Smoker    Smokeless tobacco: Never Used   Substance and Sexual Activity    Alcohol use: Yes     Comment: occasionally    Drug use: Never    Sexual activity: Not on file         Current Outpatient Medications:     acetaminophen (TYLENOL) 325 mg tablet, Take 2 tablets (650 mg total) by mouth every 6 (six) hours as needed for mild pain, headaches or fever, Disp: 30 tablet, Rfl: 0    amiodarone 200 mg tablet, Take 200 mg by mouth daily, Disp: , Rfl:     Cholecalciferol (D3 High Potency) 50 MCG (2000 UT) CAPS, Take by mouth, Disp: , Rfl:     diclofenac sodium (VOLTAREN) 1 %, , Disp: , Rfl:     enoxaparin (LOVENOX) 80 mg/0 8 mL, INJECT AS DIRECTED EVERY 12 HOURS SUBCUTANEOUSLY FOR 2 DAYS, Disp: , Rfl:     gabapentin (NEURONTIN) 100 mg capsule, Take 100 mg by mouth 2 (two) times a day, Disp: , Rfl:     levothyroxine 25 mcg tablet, Take 25 mcg by mouth daily, Disp: , Rfl:     LORazepam (ATIVAN) 1 mg tablet, Take 1 mg by mouth daily at bedtime, Disp: , Rfl:     melatonin 1 mg, Take 3 mg by mouth daily at bedtime, Disp: , Rfl:     memantine (NAMENDA) 10 mg tablet, , Disp: , Rfl:     polyethylene glycol (MIRALAX) 17 g packet, Take 17 g by mouth daily as needed (constipation), Disp: 14 each, Rfl: 0    RA LAXATIVE powder, take 17GM (DISSOLVED IN WATER) by mouth once daily if needed for constipation, Disp: , Rfl:     senna-docusate sodium (SENOKOT S) 8 6-50 mg per tablet, Take 1 tablet by mouth daily at bedtime, Disp: 30 tablet, Rfl: 0    traZODone (DESYREL) 50 mg tablet, Take 50 mg by mouth daily at bedtime, Disp: , Rfl:     venlafaxine (EFFEXOR) 75 mg tablet, Take 75 mg by mouth 2 (two) times a day, Disp: , Rfl:     warfarin (COUMADIN) 2 5 mg tablet, Every Monday, Wednesday and Friday, Disp: , Rfl: 0    warfarin (COUMADIN) 5 mg tablet, Take 1 tablet (5 mg total) by mouth see administration instructions Four days in a week  Every  Tuesday, Thursday, Saturday and Sunday, Disp: 30 tablet, Rfl: 0    Allergies   Allergen Reactions    Penicillins Anaphylaxis     Other reaction(s):  Anaphylaxis      Iodides      Other reaction(s): Rash         Physical Exam:    /73   Pulse 78   Resp 20   Ht 5' 5" (1 651 m)   Wt 72 9 kg (160 lb 12 8 oz)   BMI 26 76 kg/m²     Constitutional: normal, well developed, well nourished, alert, in no distress and non-toxic and no overt pain behavior  Eyes: anicteric  HEENT: grossly intact  Neck: supple, symmetric, trachea midline and no masses   Pulmonary:even and unlabored  Cardiovascular:No edema or pitting edema present  Skin:Normal without rashes or lesions and well hydrated  Psychiatric:Mood and affect appropriate  Neurologic:Cranial Nerves II-XII grossly intact  Musculoskeletal:antalgic     Lumbar Spine Exam    Appearance:  Normal lordosis  Palpation/Tenderness:  no tenderness or spasm  Sensory:  no sensory deficits noted  Range of Motion:  Flexion:   Moderately limited  with pain  Extension:  Severely limited  with pain  Lateral Flexion - Left:  Severely limited  with pain  Lateral Flexion - Right:  Severely limited  with pain  Rotation - Left:  Severely limited  with pain  Rotation - Right:  Severely limited  with pain   Lumbar facet loading is positive bilaterally  Motor Strength:  Left hip flexion:  5/5  Left hip extension:  5/5  Right hip flexion:  5/5  Right hip extension:  5/5  Left knee flexion:  5/5  Left knee extension:  5/5  Right knee flexion:  5/5  Right knee extension:  5/5  Left foot dorsiflexion:  5/5  Left foot plantar flexion:  5/5  Right foot dorsiflexion:  5/5  Right foot plantar flexion:  5/5  Reflexes:  Left Patellar:  2+   Right Patellar:  2+   Left Achilles:  2+   Right Achilles:  2+   Special Tests:  Left Straight Leg Test:  negative  Right Straight Leg Test:  negative  Left Lauri's Maneuver:  negative  Right Lauri's Maneuver:  negative    Imaging      XR spine lumbar complete w bending minimum 6 views    (Results Pending)   FL spine and pain procedure    (Results Pending)       Orders Placed This Encounter   Procedures    XR spine lumbar complete w bending minimum 6 views    FL spine and pain procedure    Ambulatory referral to Rheumatology    Ambulatory referral to Orthopedic Surgery    Ambulatory referral to Sports Medicine

## 2020-07-07 ENCOUNTER — TELEPHONE (OUTPATIENT)
Dept: RADIOLOGY | Facility: CLINIC | Age: 75
End: 2020-07-07

## 2020-07-07 NOTE — TELEPHONE ENCOUNTER
*covering ES today- please transfer to [phone]*    Called, [facility] for pt to cb to schedule B/L L3, L4, L5, S1 MBB#1

## 2020-07-14 NOTE — TELEPHONE ENCOUNTER
Spoke to pt, scheduled B/L L3-S1 MBB#1 on Wed 07/22/20 arr at 09:30  Went over pre procedure instructions, no vaccinations 2 weeks prior/post proc, NPO 1 hr prior, if sick or on abx needs to call to rs, wear loose, comf clothing- no buttons/zippers, needs   Pt verbalized understanding  COVID disclaimer/ screening completed  Pt states they have NOT had COVID  Because of possible immunosuppression due to steroid, pt is aware that he should practice more strict social distancing, masking, handwashing for 2-3 weeks following procedure  Reviewed check in process with pt- will enter building no earlier than arrival time given, agreed to wear mask for duration of appt,  will wait in car

## 2020-07-20 ENCOUNTER — APPOINTMENT (OUTPATIENT)
Dept: RADIOLOGY | Facility: CLINIC | Age: 75
End: 2020-07-20
Payer: MEDICARE

## 2020-07-20 ENCOUNTER — OFFICE VISIT (OUTPATIENT)
Dept: OBGYN CLINIC | Facility: CLINIC | Age: 75
End: 2020-07-20
Payer: MEDICARE

## 2020-07-20 VITALS
HEIGHT: 65 IN | WEIGHT: 160 LBS | DIASTOLIC BLOOD PRESSURE: 82 MMHG | SYSTOLIC BLOOD PRESSURE: 139 MMHG | BODY MASS INDEX: 26.66 KG/M2 | TEMPERATURE: 97.5 F | HEART RATE: 75 BPM

## 2020-07-20 DIAGNOSIS — M22.2X2 PATELLOFEMORAL SYNDROME OF BOTH KNEES: ICD-10-CM

## 2020-07-20 DIAGNOSIS — G89.29 CHRONIC PAIN OF RIGHT KNEE: ICD-10-CM

## 2020-07-20 DIAGNOSIS — R29.898 WEAKNESS OF BOTH HIPS: ICD-10-CM

## 2020-07-20 DIAGNOSIS — M48.00 CENTRAL SPINAL STENOSIS: ICD-10-CM

## 2020-07-20 DIAGNOSIS — M51.26 BULGING LUMBAR DISC: ICD-10-CM

## 2020-07-20 DIAGNOSIS — Z20.822 ENCOUNTER FOR LABORATORY TESTING FOR COVID-19 VIRUS: ICD-10-CM

## 2020-07-20 DIAGNOSIS — M54.16 RADICULOPATHY, LUMBAR REGION: ICD-10-CM

## 2020-07-20 DIAGNOSIS — M62.9 HAMSTRING TIGHTNESS OF BOTH LOWER EXTREMITIES: ICD-10-CM

## 2020-07-20 DIAGNOSIS — M22.2X1 PATELLOFEMORAL SYNDROME OF BOTH KNEES: ICD-10-CM

## 2020-07-20 DIAGNOSIS — M17.0 PRIMARY OSTEOARTHRITIS OF BOTH KNEES: Primary | ICD-10-CM

## 2020-07-20 DIAGNOSIS — M25.562 CHRONIC PAIN OF LEFT KNEE: ICD-10-CM

## 2020-07-20 DIAGNOSIS — M25.561 CHRONIC PAIN OF RIGHT KNEE: ICD-10-CM

## 2020-07-20 DIAGNOSIS — M48.061 FORAMINAL STENOSIS OF LUMBAR REGION: ICD-10-CM

## 2020-07-20 DIAGNOSIS — G89.29 CHRONIC PAIN OF LEFT KNEE: ICD-10-CM

## 2020-07-20 PROCEDURE — 99204 OFFICE O/P NEW MOD 45 MIN: CPT | Performed by: FAMILY MEDICINE

## 2020-07-20 PROCEDURE — 20610 DRAIN/INJ JOINT/BURSA W/O US: CPT | Performed by: FAMILY MEDICINE

## 2020-07-20 PROCEDURE — U0003 INFECTIOUS AGENT DETECTION BY NUCLEIC ACID (DNA OR RNA); SEVERE ACUTE RESPIRATORY SYNDROME CORONAVIRUS 2 (SARS-COV-2) (CORONAVIRUS DISEASE [COVID-19]), AMPLIFIED PROBE TECHNIQUE, MAKING USE OF HIGH THROUGHPUT TECHNOLOGIES AS DESCRIBED BY CMS-2020-01-R: HCPCS

## 2020-07-20 PROCEDURE — 73562 X-RAY EXAM OF KNEE 3: CPT

## 2020-07-20 RX ADMIN — TRIAMCINOLONE ACETONIDE 40 MG: 40 INJECTION, SUSPENSION INTRA-ARTICULAR; INTRAMUSCULAR at 14:10

## 2020-07-20 RX ADMIN — LIDOCAINE HYDROCHLORIDE 5 ML: 10 INJECTION, SOLUTION INFILTRATION; PERINEURAL at 14:10

## 2020-07-20 RX ADMIN — LIDOCAINE HYDROCHLORIDE 4 ML: 10 INJECTION, SOLUTION INFILTRATION; PERINEURAL at 14:10

## 2020-07-20 NOTE — LETTER
July 24, 2020     Sanam Garcia DO  [address] [place] [address]    Patient: Fátima Crockett   YOB: 1945   Date of Visit: 7/20/2020       Dear Dr Sesar Alford: Thank you for referring Fátima Crockett to me for evaluation  Below are my notes for this consultation  If you have questions, please do not hesitate to call me  I look forward to following your patient along with you  Sincerely,        [employer], DO        CC: No Recipients  [employer]  7/24/2020  9:59 AM  Sign at close encounter  Assessment/Plan:  Assessment/Plan   Diagnoses and all orders for this visit:    Primary osteoarthritis of both knees  -     Ambulatory referral to Sports Medicine  -     XR knee 3 vw left non injury; Future  -     XR knee 3 vw right non injury; Future  -     Ambulatory referral to Physical Therapy; Future  -     Large joint arthrocentesis: R knee    Patellofemoral syndrome of both knees  -     Ambulatory referral to Physical Therapy; Future  -     Brace    Weakness of both hips  -     Ambulatory referral to Physical Therapy; Future    Hamstring tightness of both lower extremities  -     Ambulatory referral to Physical Therapy; Future    Central spinal stenosis  -     Ambulatory referral to Physical Therapy; Future    Foraminal stenosis of lumbar region  -     Ambulatory referral to Physical Therapy; Future    Radiculopathy, lumbar region  -     Ambulatory referral to Physical Therapy; Future    Bulging lumbar disc  -     Ambulatory referral to Physical Therapy; Future      58-year-old female with bilateral knee pain, right worse than left more than few years duration  Discussed with patient physical exam, radiographs, impression and plan  X-rays of both knees noted for osteoarthritis with significant patellofemoral joint space narrowing  Physical noted for effusion of the right knee  There is genu valgum deformity    Right knee has tenderness at the medial and lateral joint lines, as well as patellar undersurface  She has range of motion limited extension of -5° and flexion to 120°  There is positive patellar inhibition and grind  She has hamstring tightness both lower extremities and weakness both hips with abduction  There is no groin pain with EK and FADDIR maneuvers of the hips  Clinical impression that she is symptomatic from combination of osteoarthritis and patellofemoral syndrome  I discussed regimen of knee aspiration with corticosteroid injection, supplements, knee brace, and physical therapy  I aspirated 60 cc of yellow serous fluid and administered mixture of 4 cc 1% lidocaine and 1 cc Kenalog to the right knee without complication  I provided with patellar stabilizing knee brace  She is to start physical therapy as soon as possible and do home exercises as directed  She will follow up in 6 weeks at which point she will be re-evaluated  Subjective:   Patient ID: Jorge L Shown is a 69 y o  female  Chief Complaint   Patient presents with    Right Knee - Pain, Swelling       67-year-old female presents for evaluation of bilateral knee pain, right worse than left more than few years duration  She denies any particular trauma or inciting event  Pain described as generalized to the right knee but worse at the anterior lateral aspect, intermittent, achy and sometimes sharp, nonradiating, worse with twisting, worse with going up and down stairs, worse with rising from seated position, associated swelling, worse with ambulation, and improved with resting or changing position  She is on oral anticoagulation and has been using topical Voltaren gel  Knee Pain   This is a chronic problem  The current episode started more than 1 year ago  The problem occurs intermittently  The problem has been gradually worsening  Associated symptoms include arthralgias and joint swelling   Pertinent negatives include no abdominal pain, chest pain, chills, fever, numbness, rash, sore throat or weakness  The symptoms are aggravated by twisting (Stairs)  She has tried rest, position changes and acetaminophen (Voltaren gel) for the symptoms  The treatment provided mild relief  The following portions of the patient's history were reviewed and updated as appropriate: She  has a past medical history of Atrial fibrillation ([facility] [address] ), [facility] ), Diverticulitis-recent (6/5/2020), and Tick bite with subsequent removal of tick (5/28/2019)  She  has a past surgical history that includes Appendectomy; Tonsillectomy; Mastectomy; Hysterectomy; Breast surgery; and Cardiac valve replacement  Her family history includes Cancer in her mother; Colon cancer in her father, paternal grandfather, and paternal grandmother; Diabetes in her mother  She  reports that she has never smoked  She has never used smokeless tobacco  She reports that she drinks alcohol  She reports that she does not use drugs  She is allergic to penicillins and iodides       Review of Systems   Constitutional: Negative for chills and fever  HENT: Negative for sore throat  Eyes: Negative for visual disturbance  Respiratory: Negative for shortness of breath  Cardiovascular: Negative for chest pain  Gastrointestinal: Negative for abdominal pain  Genitourinary: Negative for flank pain  Musculoskeletal: Positive for arthralgias and joint swelling  Skin: Negative for rash and wound  Neurological: Negative for weakness and numbness  Hematological: Does not bruise/bleed easily  Psychiatric/Behavioral: Negative for self-injury  Objective:  Vitals:    07/20/20 1325   BP: 139/82   Pulse: 75   Temp: 97 5 °F (36 4 °C)   Weight: 72 6 kg (160 lb)   Height: 5' 5" (1 651 m)     Right Ankle Exam     Muscle Strength   Dorsiflexion:  5/5  Plantar flexion:  5/5      Right Knee Exam     Muscle Strength   The patient has normal right knee strength      Tenderness   The patient is experiencing tenderness in the medial joint line and lateral joint line (Patellar undersurface)  Range of Motion   Extension: -5   Flexion: 120     Other   Effusion: effusion present    Comments:  Genu valgus  Crepitus  Positive patellar inhibition and grind      Left Knee Exam     Muscle Strength   The patient has normal left knee strength  Range of Motion   Extension: normal     Other   Swelling: none      Right Hip Exam     Muscle Strength   Abduction: 4/5   Flexion: 5/5     Tests   KE: negative    Comments:  Negative FADDIR  Hamstring tightness      Left Hip Exam     Muscle Strength   Abduction: 4/5   Flexion: 5/5     Tests   KE: negative    Comments:  Negative FADDIR  Hamstring tightness          Observations     Right Knee   Positive for effusion  Strength/Myotome Testing     Right Ankle/Foot   Dorsiflexion: 5  Plantar flexion: 5      Physical Exam   Constitutional: She is oriented to person, place, and time  She appears well-developed and well-nourished  No distress  HENT:   Head: Normocephalic  Eyes: Conjunctivae are normal    Neck: No tracheal deviation present  Cardiovascular: Normal rate  Pulmonary/Chest: Effort normal  No respiratory distress  Abdominal: She exhibits no distension  Musculoskeletal:        Right knee: She exhibits effusion  Neurological: She is alert and oriented to person, place, and time  She exhibits normal muscle tone  Coordination normal    Skin: Skin is warm and dry  She is not diaphoretic  No erythema  No pallor  Psychiatric: She has a normal mood and affect  Her behavior is normal  Judgment and thought content normal    Nursing note and vitals reviewed  I have personally reviewed pertinent films in PACS and my interpretation is Osteoarthritis of both knees with significant patellofemoral narrowing        Large joint arthrocentesis: R knee  Date/Time: 7/20/2020 2:10 PM  Consent given by: patient  Site marked: site marked  Timeout: Immediately prior to procedure a time out was called to verify the correct patient, procedure, equipment, support staff and site/side marked as required   Supporting Documentation  Indications: pain   Procedure Details  Location: knee - R knee  Preparation: Patient was prepped and draped in the usual sterile fashion  Needle size: 18 G  Ultrasound guidance: no  Approach: superior  Medications administered: 4 mL lidocaine 1 %; 5 mL lidocaine 1 %; 40 mg triamcinolone acetonide 40 mg/mL    Aspirate amount: 60 mL  Aspirate: serous and yellow    Patient tolerance: patient tolerated the procedure well with no immediate complications  Dressing:  Sterile dressing applied

## 2020-07-20 NOTE — PROGRESS NOTES
Assessment/Plan:  Assessment/Plan   Diagnoses and all orders for this visit:    Primary osteoarthritis of both knees  -     Ambulatory referral to Sports Medicine  -     XR knee 3 vw left non injury; Future  -     XR knee 3 vw right non injury; Future  -     Ambulatory referral to Physical Therapy; Future  -     Large joint arthrocentesis: R knee    Patellofemoral syndrome of both knees  -     Ambulatory referral to Physical Therapy; Future  -     Brace    Weakness of both hips  -     Ambulatory referral to Physical Therapy; Future    Hamstring tightness of both lower extremities  -     Ambulatory referral to Physical Therapy; Future    Central spinal stenosis  -     Ambulatory referral to Physical Therapy; Future    Foraminal stenosis of lumbar region  -     Ambulatory referral to Physical Therapy; Future    Radiculopathy, lumbar region  -     Ambulatory referral to Physical Therapy; Future    Bulging lumbar disc  -     Ambulatory referral to Physical Therapy; Future      67-year-old female with bilateral knee pain, right worse than left more than few years duration  Discussed with patient physical exam, radiographs, impression and plan  X-rays of both knees noted for osteoarthritis with significant patellofemoral joint space narrowing  Physical noted for effusion of the right knee  There is genu valgum deformity  Right knee has tenderness at the medial and lateral joint lines, as well as patellar undersurface  She has range of motion limited extension of -5° and flexion to 120°  There is positive patellar inhibition and grind  She has hamstring tightness both lower extremities and weakness both hips with abduction  There is no groin pain with KE and FADDIR maneuvers of the hips  Clinical impression that she is symptomatic from combination of osteoarthritis and patellofemoral syndrome  I discussed regimen of knee aspiration with corticosteroid injection, supplements, knee brace, and physical therapy  I aspirated 60 cc of yellow serous fluid and administered mixture of 4 cc 1% lidocaine and 1 cc Kenalog to the right knee without complication  I provided with patellar stabilizing knee brace  She is to start physical therapy as soon as possible and do home exercises as directed  She will follow up in 6 weeks at which point she will be re-evaluated  Subjective:   Patient ID: Dylan Avendaño is a 69 y o  female  Chief Complaint   Patient presents with    Right Knee - Pain, Swelling       81-year-old female presents for evaluation of bilateral knee pain, right worse than left more than few years duration  She denies any particular trauma or inciting event  Pain described as generalized to the right knee but worse at the anterior lateral aspect, intermittent, achy and sometimes sharp, nonradiating, worse with twisting, worse with going up and down stairs, worse with rising from seated position, associated swelling, worse with ambulation, and improved with resting or changing position  She is on oral anticoagulation and has been using topical Voltaren gel  Knee Pain   This is a chronic problem  The current episode started more than 1 year ago  The problem occurs intermittently  The problem has been gradually worsening  Associated symptoms include arthralgias and joint swelling  Pertinent negatives include no abdominal pain, chest pain, chills, fever, numbness, rash, sore throat or weakness  The symptoms are aggravated by twisting (Stairs)  She has tried rest, position changes and acetaminophen (Voltaren gel) for the symptoms  The treatment provided mild relief  The following portions of the patient's history were reviewed and updated as appropriate: She  has a past medical history of Atrial fibrillation ([facility] [address] ), Breast cancer ([facility] [address] ), Diverticulitis-recent (6/5/2020), and Tick bite with subsequent removal of tick (5/28/2019)  She  has a past surgical history that includes Appendectomy;  Tonsillectomy; Mastectomy; Hysterectomy; Breast surgery; and Cardiac valve replacement  Her family history includes Cancer in her mother; Colon cancer in her father, paternal grandfather, and paternal grandmother; Diabetes in her mother  She  reports that she has never smoked  She has never used smokeless tobacco  She reports that she drinks alcohol  She reports that she does not use drugs  She is allergic to penicillins and iodides       Review of Systems   Constitutional: Negative for chills and fever  HENT: Negative for sore throat  Eyes: Negative for visual disturbance  Respiratory: Negative for shortness of breath  Cardiovascular: Negative for chest pain  Gastrointestinal: Negative for abdominal pain  Genitourinary: Negative for flank pain  Musculoskeletal: Positive for arthralgias and joint swelling  Skin: Negative for rash and wound  Neurological: Negative for weakness and numbness  Hematological: Does not bruise/bleed easily  Psychiatric/Behavioral: Negative for self-injury  Objective:  Vitals:    07/20/20 1325   BP: 139/82   Pulse: 75   Temp: 97 5 °F (36 4 °C)   Weight: 72 6 kg (160 lb)   Height: 5' 5" (1 651 m)     Right Ankle Exam     Muscle Strength   Dorsiflexion:  5/5  Plantar flexion:  5/5      Right Knee Exam     Muscle Strength   The patient has normal right knee strength  Tenderness   The patient is experiencing tenderness in the medial joint line and lateral joint line (Patellar undersurface)  Range of Motion   Extension: -5   Flexion: 120     Other   Effusion: effusion present    Comments:  Genu valgus  Crepitus  Positive patellar inhibition and grind      Left Knee Exam     Muscle Strength   The patient has normal left knee strength      Range of Motion   Extension: normal     Other   Swelling: none      Right Hip Exam     Muscle Strength   Abduction: 4/5   Flexion: 5/5     Tests   KE: negative    Comments:  Negative FADDIR  Hamstring tightness      Left Hip Exam Muscle Strength   Abduction: 4/5   Flexion: 5/5     Tests   KE: negative    Comments:  Negative FADDIR  Hamstring tightness          Observations     Right Knee   Positive for effusion  Strength/Myotome Testing     Right Ankle/Foot   Dorsiflexion: 5  Plantar flexion: 5      Physical Exam   Constitutional: She is oriented to person, place, and time  She appears well-developed and well-nourished  No distress  HENT:   Head: Normocephalic  Eyes: Conjunctivae are normal    Neck: No tracheal deviation present  Cardiovascular: Normal rate  Pulmonary/Chest: Effort normal  No respiratory distress  Abdominal: She exhibits no distension  Musculoskeletal:        Right knee: She exhibits effusion  Neurological: She is alert and oriented to person, place, and time  She exhibits normal muscle tone  Coordination normal    Skin: Skin is warm and dry  She is not diaphoretic  No erythema  No pallor  Psychiatric: She has a normal mood and affect  Her behavior is normal  Judgment and thought content normal    Nursing note and vitals reviewed  I have personally reviewed pertinent films in PACS and my interpretation is Osteoarthritis of both knees with significant patellofemoral narrowing        Large joint arthrocentesis: R knee  Date/Time: 7/20/2020 2:10 PM  Consent given by: patient  Site marked: site marked  Timeout: Immediately prior to procedure a time out was called to verify the correct patient, procedure, equipment, support staff and site/side marked as required   Supporting Documentation  Indications: pain   Procedure Details  Location: knee - R knee  Preparation: Patient was prepped and draped in the usual sterile fashion  Needle size: 18 G  Ultrasound guidance: no  Approach: superior  Medications administered: 4 mL lidocaine 1 %; 5 mL lidocaine 1 %; 40 mg triamcinolone acetonide 40 mg/mL    Aspirate amount: 60 mL  Aspirate: serous and yellow    Patient tolerance: patient tolerated the procedure well with no immediate complications  Dressing:  Sterile dressing applied

## 2020-07-21 LAB
INPATIENT: NORMAL
SARS-COV-2 RNA SPEC QL NAA+PROBE: NOT DETECTED

## 2020-07-22 ENCOUNTER — ANESTHESIA EVENT (OUTPATIENT)
Dept: GASTROENTEROLOGY | Facility: HOSPITAL | Age: 75
End: 2020-07-22

## 2020-07-22 ENCOUNTER — HOSPITAL ENCOUNTER (OUTPATIENT)
Dept: RADIOLOGY | Facility: CLINIC | Age: 75
Discharge: HOME | End: 2020-07-22
Attending: ANESTHESIOLOGY | Admitting: ANESTHESIOLOGY
Payer: MEDICARE

## 2020-07-22 ENCOUNTER — CONSULT (OUTPATIENT)
Dept: CARDIOLOGY CLINIC | Facility: CLINIC | Age: 75
End: 2020-07-22
Payer: MEDICARE

## 2020-07-22 VITALS
RESPIRATION RATE: 20 BRPM | TEMPERATURE: 98.2 F | DIASTOLIC BLOOD PRESSURE: 78 MMHG | HEART RATE: 72 BPM | SYSTOLIC BLOOD PRESSURE: 147 MMHG | OXYGEN SATURATION: 95 %

## 2020-07-22 VITALS
WEIGHT: 164 LBS | OXYGEN SATURATION: 97 % | TEMPERATURE: 98.5 F | DIASTOLIC BLOOD PRESSURE: 72 MMHG | SYSTOLIC BLOOD PRESSURE: 130 MMHG | HEIGHT: 65 IN | HEART RATE: 76 BPM | BODY MASS INDEX: 27.32 KG/M2

## 2020-07-22 DIAGNOSIS — M54.50 CHRONIC BILATERAL LOW BACK PAIN WITHOUT SCIATICA: ICD-10-CM

## 2020-07-22 DIAGNOSIS — G89.29 CHRONIC BILATERAL LOW BACK PAIN WITHOUT SCIATICA: ICD-10-CM

## 2020-07-22 DIAGNOSIS — G89.4 CHRONIC PAIN SYNDROME: ICD-10-CM

## 2020-07-22 DIAGNOSIS — Z95.2 H/O PROSTHETIC HEART VALVE: ICD-10-CM

## 2020-07-22 DIAGNOSIS — I48.91 ATRIAL FIBRILLATION, UNSPECIFIED TYPE (HCC): ICD-10-CM

## 2020-07-22 DIAGNOSIS — M47.816 LUMBAR SPONDYLOSIS: ICD-10-CM

## 2020-07-22 PROCEDURE — 93000 ELECTROCARDIOGRAM COMPLETE: CPT | Performed by: INTERNAL MEDICINE

## 2020-07-22 PROCEDURE — 64493 INJ PARAVERT F JNT L/S 1 LEV: CPT | Performed by: ANESTHESIOLOGY

## 2020-07-22 PROCEDURE — 99204 OFFICE O/P NEW MOD 45 MIN: CPT | Performed by: INTERNAL MEDICINE

## 2020-07-22 PROCEDURE — 64495 INJ PARAVERT F JNT L/S 3 LEV: CPT | Performed by: ANESTHESIOLOGY

## 2020-07-22 PROCEDURE — 64494 INJ PARAVERT F JNT L/S 2 LEV: CPT | Performed by: ANESTHESIOLOGY

## 2020-07-22 RX ORDER — MEPERIDINE HYDROCHLORIDE 50 MG/ML
12.5 INJECTION INTRAMUSCULAR; INTRAVENOUS; SUBCUTANEOUS ONCE
Status: CANCELLED | OUTPATIENT
Start: 2020-07-22 | End: 2020-07-22

## 2020-07-22 RX ORDER — LABETALOL 20 MG/4 ML (5 MG/ML) INTRAVENOUS SYRINGE
5
Status: CANCELLED | OUTPATIENT
Start: 2020-07-22

## 2020-07-22 RX ORDER — ALBUTEROL SULFATE 2.5 MG/3ML
2.5 SOLUTION RESPIRATORY (INHALATION) ONCE AS NEEDED
Status: CANCELLED | OUTPATIENT
Start: 2020-07-22

## 2020-07-22 RX ORDER — FENTANYL CITRATE/PF 50 MCG/ML
25 SYRINGE (ML) INJECTION
Status: CANCELLED | OUTPATIENT
Start: 2020-07-22

## 2020-07-22 RX ORDER — CALCIUM POLYCARBOPHIL 625 MG 625 MG/1
625 TABLET ORAL DAILY
COMMUNITY

## 2020-07-22 RX ORDER — LIDOCAINE HYDROCHLORIDE 10 MG/ML
10 INJECTION, SOLUTION EPIDURAL; INFILTRATION; INTRACAUDAL; PERINEURAL ONCE
Status: COMPLETED | OUTPATIENT
Start: 2020-07-22 | End: 2020-07-22

## 2020-07-22 RX ORDER — HYDROMORPHONE HCL/PF 1 MG/ML
0.5 SYRINGE (ML) INJECTION
Status: CANCELLED | OUTPATIENT
Start: 2020-07-22

## 2020-07-22 RX ORDER — PROMETHAZINE HYDROCHLORIDE 25 MG/ML
12.5 INJECTION, SOLUTION INTRAMUSCULAR; INTRAVENOUS ONCE AS NEEDED
Status: CANCELLED | OUTPATIENT
Start: 2020-07-22

## 2020-07-22 RX ORDER — ONDANSETRON 2 MG/ML
4 INJECTION INTRAMUSCULAR; INTRAVENOUS ONCE AS NEEDED
Status: CANCELLED | OUTPATIENT
Start: 2020-07-22

## 2020-07-22 RX ORDER — LANOLIN ALCOHOL/MO/W.PET/CERES
1 CREAM (GRAM) TOPICAL 2 TIMES DAILY
COMMUNITY
End: 2021-07-01

## 2020-07-22 RX ADMIN — LIDOCAINE HYDROCHLORIDE 10 ML: 10 INJECTION, SOLUTION EPIDURAL; INFILTRATION; INTRACAUDAL; PERINEURAL at 09:47

## 2020-07-22 NOTE — PROGRESS NOTES
Cardiology Consultation     Sarina Pelletier  [id number]  1945  [facility] [address] [facility] [place] [address]    1  Atrial fibrillation, unspecified type [facility])  Ambulatory referral to Cardiology   2  H/O prosthetic heart valve  Ambulatory referral to Cardiology       Chief Complaint: To establish cardiology care in view of previous history of atrial fibrillation    HPI:  80-year-old female with mitral valve disease status post replacement, paroxysmal AFib, anxiety, breast cancer was referred from primary care physician's office to establish cardiology care  Patient was recently hospitalized in June of 2020 for diverticulitis  Patient also had blood loss anemia from supratherapeutic INR in the right arm hematoma  Her abdominal pain and right arm hematoma has resolved  Patient occasionally get pain after eating in her left lower quadrant  Patient denies chest pain, shortness of breath, palpitation, dizziness, nausea, vomiting or loss of consciousness  She denies any black stool or blood in stool  At baseline patient can walk 2-3 blocks without chest pain or shortness of breath  She also walks at least 2 flight of stair without chest pain or shortness of breath  She does not walk this far routinely due to knee arthritis  Patient denies personal history of myocardial infarction, arrhythmia, TIA/CVA her, heart failure or peripheral artery disease    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 [facility] 23 mm in [place] in 2002  She had AFib post operatively and is on amiodarone chronically  She was started on Synthroid as her thyroid was affected probably from amiodarone  She follows up with cardiologist in [facility] and her last visit was before 3 months    She had cardiac stress test and echocardiogram 2 years back which was okay as per patient    Patient goes to [place] for INR check and her INR is being currently managed by [facility]  Last INR was within therapeutic rate more than 2 5 by before a week  (no previous cardiology result or nodes available for review)    Social History:  Denies smoking, alcohol intake or illicit drug use  Family History: Mother  of MI at age of 54    Labs from 2020 reviewed  , total cholesterol 222, creatinine 0 84  Hemoglobin was 8 increased to 10  AST ALT within normal limits  TSH 2 08    EKG in 2020 showed sinus rhythm      Review of Systems:  Review of system negative except as mentioned above    Patient Active Problem List   Diagnosis    Arm contusion, right 2/2 Coumadin toxicity    Arm swelling    Atrial fibrillation (HCC)    H/O prosthetic heart valve    Acute blood loss anemia    Constipation    Compression fracture of lumbar spine, non-traumatic, with routine healing, subsequent encounter    History of breast cancer    Other specified hypothyroidism    Chronic midline low back pain with right-sided sciatica    Lump of skin of right lower extremity    Bilateral impacted cerumen    Medicare annual wellness visit, subsequent    Anxiety    Arthritis    Chronic pain syndrome    Lumbar spondylosis     Past Medical History:   Diagnosis Date    [facility]     [facility]     Diverticulitis-recent 2020    Tick bite with subsequent removal of tick 2019    Last Assessment & Plan:  On doxy so no need for lymes testing or additional antibiotic     Social History     Socioeconomic History    Marital status:       Spouse name: Not on file    Number of children: Not on file    Years of education: Not on file    Highest education level: Not on file   Occupational History    Not on file   Social Needs    Financial resource strain: Not on file    Food insecurity:     Worry: Not on file     Inability: Not on file   Sonal Hdz Transportation needs:     Medical: Not on file     Non-medical: Not on file   Tobacco Use    Smoking status: Never Smoker    Smokeless tobacco: Never Used   Substance and Sexual Activity    Alcohol use: Yes     Comment: occasionally    Drug use: Never    Sexual activity: Not on file   Lifestyle    Physical activity:     Days per week: Not on file     Minutes per session: Not on file    Stress: Not on file   Relationships    Social connections:     Talks on phone: Not on file     Gets together: Not on file     Attends Scientologist service: Not on file     Active member of club or organization: Not on file     Attends meetings of clubs or organizations: Not on file     Relationship status: Not on file    Intimate partner violence:     Fear of current or ex partner: Not on file     Emotionally abused: Not on file     Physically abused: Not on file     Forced sexual activity: Not on file   Other Topics Concern    Not on file   Social History Narrative    Lives with partner -- [facility] time [facility] here and [facility]       Family History   Problem Relation Age of Onset    Diabetes Mother     Cancer Mother     Colon cancer Father     Colon cancer Paternal Grandmother     Colon cancer Paternal Grandfather      Past Surgical History:   Procedure Laterality Date    APPENDECTOMY      BREAST SURGERY      L mastectomy    CARDIAC VALVE REPLACEMENT      MV    HYSTERECTOMY      MASTECTOMY      left breast    TONSILLECTOMY         Current Outpatient Medications:     acetaminophen (TYLENOL) 325 mg tablet, Take 2 tablets (650 mg total) by mouth every 6 (six) hours as needed for mild pain, headaches or fever, Disp: 30 tablet, Rfl: 0    amiodarone 200 mg tablet, Take 200 mg by mouth daily, Disp: , Rfl:     Cholecalciferol (D3 High Potency) 50 MCG (2000 UT) CAPS, Take by mouth, Disp: , Rfl:     diclofenac sodium (VOLTAREN) 1 %, , Disp: , Rfl:     enoxaparin (LOVENOX) 80 mg/0 8 mL, INJECT AS DIRECTED EVERY 12 HOURS SUBCUTANEOUSLY FOR 2 DAYS, Disp: , Rfl:     gabapentin (NEURONTIN) 100 mg capsule, Take 100 mg by mouth 2 (two) times a day, Disp: , Rfl:     levothyroxine 25 mcg tablet, Take 25 mcg by mouth daily, Disp: , Rfl:     LORazepam (ATIVAN) 1 mg tablet, Take 1 mg by mouth daily at bedtime, Disp: , Rfl:     melatonin 1 mg, Take 3 mg by mouth daily at bedtime, Disp: , Rfl:     memantine (NAMENDA) 10 mg tablet, , Disp: , Rfl:     polyethylene glycol (MIRALAX) 17 g packet, Take 17 g by mouth daily as needed (constipation), Disp: 14 each, Rfl: 0    RA LAXATIVE powder, take 17GM (DISSOLVED IN WATER) by mouth once daily if needed for constipation, Disp: , Rfl:     senna-docusate sodium (SENOKOT S) 8 6-50 mg per tablet, Take 1 tablet by mouth daily at bedtime, Disp: 30 tablet, Rfl: 0    traZODone (DESYREL) 50 mg tablet, Take 50 mg by mouth daily at bedtime, Disp: , Rfl:     venlafaxine (EFFEXOR) 75 mg tablet, Take 75 mg by mouth 2 (two) times a day, Disp: , Rfl:     warfarin (COUMADIN) 2 5 mg tablet, Every Monday, Wednesday and Friday, Disp: , Rfl: 0    warfarin (COUMADIN) 5 mg tablet, Take 1 tablet (5 mg total) by mouth see administration instructions Four days in a week  Every  Tuesday, Thursday, Saturday and Sunday, Disp: 30 tablet, Rfl: 0  No current facility-administered medications for this visit  Allergies   Allergen Reactions    Penicillins Anaphylaxis     Other reaction(s): Anaphylaxis      Iodides      Other reaction(s): Rash       There were no vitals filed for this visit        Labs:  Orders Only on 07/20/2020   Component Date Value    SARS-CoV-2  07/20/2020 Not Detected     Inpatient 07/20/2020 Comment    Orders Only on 06/25/2020   Component Date Value    TSH 06/25/2020 2 080     Free t4 06/25/2020 1 74     Cholesterol, Total 06/25/2020 222*    Triglycerides 06/25/2020 119     HDL 06/25/2020 75     VLDL Cholesterol Calcula* 06/25/2020 24     LDL Calculated 06/25/2020 123*    HEP C AB 06/25/2020 <0 1    No results displayed because visit has over 200 results  Admission on 05/21/2020, Discharged on 05/22/2020   Component Date Value    WBC 05/22/2020 11 46*    RBC 05/22/2020 3 74*    Hemoglobin 05/22/2020 12 0     Hematocrit 05/22/2020 36 9     MCV 05/22/2020 99*    MCH 05/22/2020 32 1     MCHC 05/22/2020 32 5     RDW 05/22/2020 14 3     MPV 05/22/2020 10 1     Platelets 27/87/4023 485*    nRBC 05/22/2020 0     Neutrophils Relative 05/22/2020 73     Immat GRANS % 05/22/2020 1     Lymphocytes Relative 05/22/2020 15     Monocytes Relative 05/22/2020 8     Eosinophils Relative 05/22/2020 2     Basophils Relative 05/22/2020 1     Neutrophils Absolute 05/22/2020 8 47*    Immature Grans Absolute 05/22/2020 0 07     Lymphocytes Absolute 05/22/2020 1 74     Monocytes Absolute 05/22/2020 0 86     Eosinophils Absolute 05/22/2020 0 19     Basophils Absolute 05/22/2020 0 13*    Sodium 05/22/2020 141     Potassium 05/22/2020 3 7     Chloride 05/22/2020 103     CO2 05/22/2020 29     ANION GAP 05/22/2020 9     BUN 05/22/2020 12     Creatinine 05/22/2020 1 02     Glucose 05/22/2020 91     Calcium 05/22/2020 8 7     AST 05/22/2020 18     ALT 05/22/2020 18     Alkaline Phosphatase 05/22/2020 92     Total Protein 05/22/2020 6 7     Albumin 05/22/2020 3 1*    Total Bilirubin 05/22/2020 0 40     eGFR 05/22/2020 55     Lipase 05/22/2020 48*    LACTIC ACID 05/22/2020 1 0      Imaging: [facility] Procedure    Result Date: 7/22/2020  Narrative: ATTENDING PHYSICIAN:  Adonay Saldaña MD  PRE-PROCEDURE DIAGNOSIS:  Lumbar facet arthropathy  POST-PROCEDURE DIAGNOSIS:  Lumbar facet arthropathy  PROCEDURE:  Bilateral lumbar diagnostic medial branch blocks at the L3, L4, L5, and S1 levels  ESTIMATED BLOOD LOSS:  Minimal  COMPLICATIONS:  None  ANESTHESIA:  Local  LOCATION:  [facility]   CONSENT: Today's procedure and its risks, benefits, as well as alternatives were explained to the patient  Risks include but are not limited to bleeding, infection, weakness, nerve irritation or damage, hematoma formation, abscess formation, failure the pain to improve, or potential worsening of the pain  The patient verbalized understanding and wished to proceed with the procedure  Written informed consent was thereby obtained  DESCRIPTION OF PROCEDURE: After written informed consent was obtained, the patient was taken to the fluoroscopy suite and placed in the prone position  Anatomical landmarks were identified with the aid of fluoroscopy in the PA and oblique views  The patient's lumbar region was prepped with antiseptic solution and draped in the usual sterile fashion  Strict aseptic technique was utilized  The skin and subcutaneous tissues at each needle entry site was infiltrated with a small amount of 1% preservative-free lidocaine using a 25-gauge 1-1/2-inch needle  A 25-gauge 3-1/2-inch needle was then incrementally advanced under fluoroscopic guidance in multiple views at each level such that the needle tip was advanced to contact os at the junction of the superior articulating process and the superomedial border of the transverse process at each of the cephalad levels as well as to contact os at the junction of the superior articulating process and the superomedial border of the sacral ala at the S1 level  After negative aspiration was confirmed, 0 5 mL of preservative-free 1% Lidocaine was injected into the cephalad needles and 1 mL of preservative-free 1% Lidocaine was injected into the needles at the S1 level  All needles were removed intact and hemostasis was maintained throughout  The patient tolerated the procedure well and no paresthesias or other complications were reported during or following this procedure  The antiseptic was wiped clean and Band-Aids were placed as appropriate   The patient was transferred to the recovery area and was observed for an appropriate period of time during which the patient remained hemodynamically stable and neurovascularly intact as the patient was prior to the procedure  The patient was subsequently discharged home in stable condition with supervision  The patient was instructed to call the office in a few days with an update  Discharge instructions were provided  I was present for and participated in all key and critical portions of this procedure  Naitvidad Valdez MD 7/22/2020 9:57 AM        Physical Exam:  General:  Obese,  awake, alert and oriented x3, not in distress  Neck: supple, no JVD  Eyes:  conjunctiva normal  Lungs:  Bilateral air entry positive, no wheeze/rhonchi or crackle  Heart:  S1-S2 normal, opening and closing sound of metallic mitral valve heard, no murmur  Abdomen:  Soft ,nondistended ,nontender, bowel sounds positive  Extremities:  No leg edema  Neuro:  Moving all extremities, speech clear  Skin: warm, no rash    There were no vitals taken for this visit  Blood pressure 130/72, pulse 76, O2 sat 96%    Cardiographics :  ECG:  Sinus rhythm, normal axis  No significant changes compared to prior EKG during hospital course      Assessment:    1  Rheumatic mitral stenosis status post prosthetic metallic mitral valve replaced in 2002  Stable  On Coumadin    2  Paroxysmal Afib  Currently in sinus rhythm  On chronic amiodarone therapy    3  Hyperlipidemia    Patient with family history of premature coronary artery disease  ASCVD risk score 13 4%  I offered patient statin for primary prophylaxis  Patient would like to try dietary management with repeat lipids on follow-up visit    4  Left breast cancer status post chemo/mastectomy in 1989 in remission      Recommendations:    I am seeing patient for the 1st time in Cardiology Clinic and she does have mitral wall replacement including Afib    I would like to get 2D echocardiogram to evaluate mitral wall including the EF   Continue current medication to include amiodarone, levothyroxine, Coumadin  Patient will be traveling back and forth from [place] and [place]  She would like to continue Coumadin monitoring by [facility] in [place]  Patient does not have any active ischemic symptoms, clinically not in heart failure, no malignant arrhythmia  She does not have diabetes and her creatinine is stable  She is scheduled to undergo colonoscopy tomorrow - she is at low risk for cardiac complication for low risk colonoscopy  Patient advised to continue Coumadin during colonoscopy  Return to clinic in 6 months or earlier as needed  Above all discussed with patient    Patient understands and agrees

## 2020-07-22 NOTE — INTERVAL H&P NOTE
Update: (This section must be completed if the H&P was completed greater than 24 hrs to procedure or admission)    H&P reviewed  After examining the patient, I find no changed to the H&P since it had been written  Patient re-evaluated   Accept as history and physical     Eliot Stokes MD/July 22, 2020/9:37 AM

## 2020-07-22 NOTE — DISCHARGE INSTR - LAB

## 2020-07-23 ENCOUNTER — HOSPITAL ENCOUNTER (OUTPATIENT)
Dept: GASTROENTEROLOGY | Facility: HOSPITAL | Age: 75
Setting detail: OUTPATIENT SURGERY
Discharge: HOME | End: 2020-07-23
Attending: INTERNAL MEDICINE | Admitting: INTERNAL MEDICINE
Payer: MEDICARE

## 2020-07-23 ENCOUNTER — ANESTHESIA (OUTPATIENT)
Dept: GASTROENTEROLOGY | Facility: HOSPITAL | Age: 75
End: 2020-07-23

## 2020-07-23 VITALS
TEMPERATURE: 98.2 F | BODY MASS INDEX: 26.48 KG/M2 | RESPIRATION RATE: 18 BRPM | SYSTOLIC BLOOD PRESSURE: 110 MMHG | DIASTOLIC BLOOD PRESSURE: 68 MMHG | WEIGHT: 158.95 LBS | HEART RATE: 57 BPM | OXYGEN SATURATION: 98 % | HEIGHT: 65 IN

## 2020-07-23 DIAGNOSIS — Z80.0 FAMILY HISTORY OF COLON CANCER: ICD-10-CM

## 2020-07-23 DIAGNOSIS — K86.9 PANCREATIC LESION: ICD-10-CM

## 2020-07-23 DIAGNOSIS — K57.92 DIVERTICULITIS: ICD-10-CM

## 2020-07-23 PROCEDURE — G0105 COLORECTAL SCRN; HI RISK IND: HCPCS | Performed by: INTERNAL MEDICINE

## 2020-07-23 RX ORDER — PROPOFOL 10 MG/ML
INJECTION, EMULSION INTRAVENOUS AS NEEDED
Status: DISCONTINUED | OUTPATIENT
Start: 2020-07-23 | End: 2020-07-23 | Stop reason: SURG

## 2020-07-23 RX ORDER — SODIUM CHLORIDE, SODIUM LACTATE, POTASSIUM CHLORIDE, CALCIUM CHLORIDE 600; 310; 30; 20 MG/100ML; MG/100ML; MG/100ML; MG/100ML
INJECTION, SOLUTION INTRAVENOUS CONTINUOUS PRN
Status: DISCONTINUED | OUTPATIENT
Start: 2020-07-23 | End: 2020-07-23 | Stop reason: SURG

## 2020-07-23 RX ORDER — LIDOCAINE HYDROCHLORIDE 10 MG/ML
0.5 INJECTION, SOLUTION EPIDURAL; INFILTRATION; INTRACAUDAL; PERINEURAL ONCE AS NEEDED
Status: DISCONTINUED | OUTPATIENT
Start: 2020-07-23 | End: 2020-07-27 | Stop reason: HOSPADM

## 2020-07-23 RX ORDER — SODIUM CHLORIDE, SODIUM LACTATE, POTASSIUM CHLORIDE, CALCIUM CHLORIDE 600; 310; 30; 20 MG/100ML; MG/100ML; MG/100ML; MG/100ML
125 INJECTION, SOLUTION INTRAVENOUS CONTINUOUS
Status: DISCONTINUED | OUTPATIENT
Start: 2020-07-23 | End: 2020-07-27 | Stop reason: HOSPADM

## 2020-07-23 RX ORDER — LIDOCAINE HYDROCHLORIDE 20 MG/ML
INJECTION, SOLUTION EPIDURAL; INFILTRATION; INTRACAUDAL; PERINEURAL AS NEEDED
Status: DISCONTINUED | OUTPATIENT
Start: 2020-07-23 | End: 2020-07-23 | Stop reason: SURG

## 2020-07-23 RX ADMIN — PROPOFOL 100 MG: 10 INJECTION, EMULSION INTRAVENOUS at 09:33

## 2020-07-23 RX ADMIN — PROPOFOL 50 MG: 10 INJECTION, EMULSION INTRAVENOUS at 09:43

## 2020-07-23 RX ADMIN — SODIUM CHLORIDE, SODIUM LACTATE, POTASSIUM CHLORIDE, AND CALCIUM CHLORIDE: .6; .31; .03; .02 INJECTION, SOLUTION INTRAVENOUS at 08:53

## 2020-07-23 RX ADMIN — PROPOFOL 50 MG: 10 INJECTION, EMULSION INTRAVENOUS at 09:36

## 2020-07-23 RX ADMIN — PROPOFOL 50 MG: 10 INJECTION, EMULSION INTRAVENOUS at 09:39

## 2020-07-23 RX ADMIN — SODIUM CHLORIDE, SODIUM LACTATE, POTASSIUM CHLORIDE, AND CALCIUM CHLORIDE 125 ML/HR: .6; .31; .03; .02 INJECTION, SOLUTION INTRAVENOUS at 08:55

## 2020-07-23 RX ADMIN — LIDOCAINE HYDROCHLORIDE 100 MG: 20 INJECTION, SOLUTION EPIDURAL; INFILTRATION; INTRACAUDAL; PERINEURAL at 09:33

## 2020-07-23 NOTE — DISCHARGE INSTRUCTIONS
Colonoscopy   WHAT YOU NEED TO KNOW:   A colonoscopy is a procedure to examine the inside of your colon (intestine) with a scope  Polyps or tissue growths may have been removed during your colonoscopy  It is normal to feel bloated and to have some abdominal discomfort  You should be passing gas  If you have hemorrhoids or you had polyps removed, you may have a small amount of bleeding  DISCHARGE INSTRUCTIONS:   Seek care immediately if:   · You have a large amount of bright red blood in your bowel movements  · Your abdomen is hard and firm and you have severe pain  · You have sudden trouble breathing  Contact your healthcare provider if:   · You develop a rash or hives  · You have a fever within 24 hours of your procedure  · You have not had a bowel movement for 3 days after your procedure  · You have questions or concerns about your condition or care  Activity:   · Do not lift, strain, or run  for 3 days after your procedure  · Rest after your procedure  You have been given medicine to relax you  Do not  drive or make important decisions until the day after your procedure  Return to your normal activity as directed  · Relieve gas and discomfort from bloating  by lying on your right side with a heating pad on your abdomen  You may need to take short walks to help the gas move out  Eat small meals until bloating is relieved  If you had polyps removed: For 7 days after your procedure:  · Do not  take aspirin  · Do not  go on long car rides  Help prevent constipation:   · Eat a variety of healthy foods  Healthy foods include fruit, vegetables, whole-grain breads, low-fat dairy products, beans, lean meat, and fish  Ask if you need to be on a special diet  Your healthcare provider may recommend that you eat high-fiber foods such as cooked beans  Fiber helps you have regular bowel movements  · Drink liquids as directed    Adults should drink between 9 and 13 eight-ounce cups of liquid every day  Ask what amount is best for you  For most people, good liquids to drink are water, juice, and milk  · Exercise as directed  Talk to your healthcare provider about the best exercise plan for you  Exercise can help prevent constipation, decrease your blood pressure and improve your health  Follow up with your healthcare provider as directed:  Write down your questions so you remember to ask them during your visits  © 2017 [employer] Information is for End User's use only and may not be sold, redistributed or otherwise used for commercial purposes  All illustrations and images included in CareNotes® are the copyrighted property of [employer]  or [employer]  The above information is an  only  It is not intended as medical advice for individual conditions or treatments  Talk to your doctor, nurse or pharmacist before following any medical regimen to see if it is safe and effective for you

## 2020-07-23 NOTE — ANESTHESIA POSTPROCEDURE EVALUATION
Post-Op Assessment Note    CV Status:  Stable  Pain Score: 0    Pain management: adequate     Mental Status:  Awake   Hydration Status:  Stable   PONV Controlled:  Controlled   Airway Patency:  Patent   Post Op Vitals Reviewed: Yes      Staff: CRNA           BP   110/65   Temp     Pulse  55   Resp   14   SpO2   98

## 2020-07-23 NOTE — INTERVAL H&P NOTE
H&P reviewed  After examining the patient I find no changes in the patients condition since the H&P had been written      Vitals:    07/23/20 0838   BP: 149/85   Pulse: 72   Resp: 15   Temp: (!) 97 3 °F (36 3 °C)   SpO2: 97%

## 2020-07-23 NOTE — ANESTHESIA PREPROCEDURE EVALUATION
Review of Systems/Medical History  Patient summary reviewed        Cardiovascular  Exercise tolerance (METS): >4,  Valvular heart disease , Valve replacement , History of CABG, Dysrhythmias , atrial fibrillation,    Pulmonary  Negative pulmonary ROS        GI/Hepatic  Negative GI/hepatic ROS          Negative  ROS        Endo/Other  Negative endo/other ROS      GYN  Negative gynecology ROS          Hematology  Negative hematology ROS      Musculoskeletal  Negative musculoskeletal ROS Back pain ,   Arthritis     Neurology  Negative neurology ROS      Psychology   Negative psychology ROS              Physical Exam    Airway    Mallampati score: I  TM Distance: >3 FB  Neck ROM: full     Dental   No notable dental hx     Cardiovascular      Pulmonary      Other Findings        Anesthesia Plan  ASA Score- 3     Anesthesia Type- IV sedation with anesthesia with ASA Monitors  Additional Monitors:   Airway Plan:     Comment: I have seen the patient and reviewed the history  Patient to receive IV sedation with full ASA monitors  Risks discussed with the patient, consent signed        Plan Factors-    Induction- intravenous  Postoperative Plan-     Informed Consent- Anesthetic plan and risks discussed with patient  I personally reviewed this patient with the CRNA  Discussed and agreed on the Anesthesia Plan with the CRNA  Chante Kerr

## 2020-07-23 NOTE — H&P
History and Physical -  [facility]  Rosa Chan 69 y o  female MRN: [id number]                  HPI: Rosa Chan is a 69y o  year old female who presents for colonoscopy for history of diverticulitis, CT scan the colon  Last colonoscopy 8 years ago  Family history of colon cancer in father and paternal grandmother  REVIEW OF SYSTEMS: Per the HPI, and otherwise unremarkable  Historical Information   Past Medical History:   Diagnosis Date    [facility]     [facility]     Diverticulitis-recent 6/5/2020    Tick bite with subsequent removal of tick 5/28/2019    Last Assessment & Plan:  On doxy so no need for lymes testing or additional antibiotic     Past Surgical History:   Procedure Laterality Date    APPENDECTOMY      BREAST SURGERY      L mastectomy    CARDIAC VALVE REPLACEMENT      MV    HYSTERECTOMY      MASTECTOMY      left breast    TONSILLECTOMY       Social History   Social History     Substance and Sexual Activity   Alcohol Use Yes    Comment: occasionally     Social History     Substance and Sexual Activity   Drug Use Never     Social History     Tobacco Use   Smoking Status Never Smoker   Smokeless Tobacco Never Used     Family History   Problem Relation Age of Onset    Diabetes Mother     Cancer Mother     Colon cancer Father     Colon cancer Paternal [de-identified]     Colon cancer Paternal Grandfather        Meds/Allergies       (Not in a hospital admission)    Allergies   Allergen Reactions    Penicillins Anaphylaxis     Other reaction(s): Anaphylaxis      Iodides      Other reaction(s): Rash         Objective     There were no vitals taken for this visit        PHYSICAL EXAM    Gen: NAD  CV: RRR  CHEST: Clear  ABD: soft, NT/ND  EXT: no edema  Neuro: AAO      ASSESSMENT/PLAN:  This is a 69y o  year old female here for diverticulitis, abnormal CT, family history of colon cancer in a first-degree relative    PLAN:   Procedure: Colonoscopy

## 2020-07-24 RX ORDER — TRIAMCINOLONE ACETONIDE 40 MG/ML
40 INJECTION, SUSPENSION INTRA-ARTICULAR; INTRAMUSCULAR
Status: COMPLETED | OUTPATIENT
Start: 2020-07-20 | End: 2020-07-20

## 2020-07-24 RX ORDER — LIDOCAINE HYDROCHLORIDE 10 MG/ML
5 INJECTION, SOLUTION INFILTRATION; PERINEURAL
Status: COMPLETED | OUTPATIENT
Start: 2020-07-20 | End: 2020-07-20

## 2020-07-24 RX ORDER — LIDOCAINE HYDROCHLORIDE 10 MG/ML
4 INJECTION, SOLUTION INFILTRATION; PERINEURAL
Status: COMPLETED | OUTPATIENT
Start: 2020-07-20 | End: 2020-07-20

## 2020-07-28 ENCOUNTER — HOSPITAL ENCOUNTER (OUTPATIENT)
Dept: ULTRASOUND IMAGING | Facility: HOSPITAL | Age: 75
End: 2020-07-28
Payer: MEDICARE

## 2020-07-28 ENCOUNTER — HOSPITAL ENCOUNTER (OUTPATIENT)
Dept: MRI IMAGING | Facility: HOSPITAL | Age: 75
Discharge: HOME | End: 2020-07-28
Payer: MEDICARE

## 2020-07-28 DIAGNOSIS — K86.9 PANCREATIC LESION: ICD-10-CM

## 2020-07-28 PROCEDURE — 74183 MRI ABD W/O CNTR FLWD CNTR: CPT

## 2020-07-28 PROCEDURE — 76377 3D RENDER W/INTRP POSTPROCES: CPT

## 2020-07-28 PROCEDURE — A9585 GADOBUTROL INJECTION: HCPCS | Performed by: PHYSICIAN ASSISTANT

## 2020-07-28 RX ADMIN — GADOBUTROL 7 ML: 604.72 INJECTION INTRAVENOUS at 11:34

## 2020-07-30 ENCOUNTER — TELEPHONE (OUTPATIENT)
Dept: GASTROENTEROLOGY | Facility: CLINIC | Age: 75
End: 2020-07-30

## 2020-07-30 DIAGNOSIS — K86.9 PANCREATIC LESION: Primary | ICD-10-CM

## 2020-07-31 ENCOUNTER — TELEPHONE (OUTPATIENT)
Dept: RADIOLOGY | Facility: CLINIC | Age: 75
End: 2020-07-31

## 2020-07-31 NOTE — TELEPHONE ENCOUNTER
Scheduled pt for MBB#2 for 8/12/20   Went over pre-procedure instructions below:  Pain level must be 5 or greater  No as needed pain meds for 6 hrs before and 6/8 hrs after procedure  Nothing to eat or drink 1 hr prior to procedure  Need to arrange transportation  Proper clothing for procedure  If ill or placed on antibiotics please call to reschedule

## 2020-07-31 NOTE — TELEPHONE ENCOUNTER
JUSTEN SPA    S/P [place] L3-S1 MBB #1 on 7/22/2020  Pt reports % relief throughout trial    Schedule MBB #2?

## 2020-08-06 ENCOUNTER — TELEPHONE (OUTPATIENT)
Dept: GASTROENTEROLOGY | Facility: CLINIC | Age: 75
End: 2020-08-06

## 2020-08-06 DIAGNOSIS — K86.9 PANCREATIC LESION: Primary | ICD-10-CM

## 2020-08-06 NOTE — TELEPHONE ENCOUNTER
----- Message from Harmony De Los Santos PA-C sent at 8/6/2020  2:12 PM EDT -----  Spoke to patient and she relayed that she she has been struggling with early satiety  Will need to plan for EGD to investigate  Please schedule patient for EGD  However, she has a mitral valve replacement on Coumadin and will need recommendations from her cardiologist   If EGD is negative, will then need GES

## 2020-08-07 NOTE — TELEPHONE ENCOUNTER
Pt was advised to continue coumadin when she had colon 2 weeks ago, do you want her to hold now or continue for egd?

## 2020-08-12 ENCOUNTER — APPOINTMENT (OUTPATIENT)
Dept: RADIOLOGY | Facility: CLINIC | Age: 75
End: 2020-08-12
Payer: MEDICARE

## 2020-08-12 DIAGNOSIS — S22.070S CLOSED WEDGE COMPRESSION FRACTURE OF NINTH THORACIC VERTEBRA, SEQUELA: ICD-10-CM

## 2020-08-12 DIAGNOSIS — S32.030S CLOSED COMPRESSION FRACTURE OF L3 LUMBAR VERTEBRA, SEQUELA: ICD-10-CM

## 2020-08-12 DIAGNOSIS — S32.010A CLOSED COMPRESSION FRACTURE OF BODY OF L1 VERTEBRA (HCC): ICD-10-CM

## 2020-08-12 DIAGNOSIS — G89.29 CHRONIC BILATERAL LOW BACK PAIN WITHOUT SCIATICA: ICD-10-CM

## 2020-08-12 DIAGNOSIS — M47.816 LUMBAR SPONDYLOSIS: ICD-10-CM

## 2020-08-12 DIAGNOSIS — M54.50 CHRONIC BILATERAL LOW BACK PAIN WITHOUT SCIATICA: ICD-10-CM

## 2020-08-12 DIAGNOSIS — G89.4 CHRONIC PAIN SYNDROME: ICD-10-CM

## 2020-08-12 PROCEDURE — 72114 X-RAY EXAM L-S SPINE BENDING: CPT

## 2020-08-13 RX ORDER — GABAPENTIN 300 MG/1
CAPSULE ORAL
COMMUNITY
Start: 2020-07-29 | End: 2021-03-01 | Stop reason: SDUPTHER

## 2020-08-14 ENCOUNTER — HOSPITAL ENCOUNTER (OUTPATIENT)
Dept: NON INVASIVE DIAGNOSTICS | Facility: CLINIC | Age: 75
Discharge: HOME | End: 2020-08-14
Payer: MEDICARE

## 2020-08-14 DIAGNOSIS — Z95.2 H/O PROSTHETIC HEART VALVE: ICD-10-CM

## 2020-08-14 DIAGNOSIS — I48.91 ATRIAL FIBRILLATION, UNSPECIFIED TYPE (HCC): ICD-10-CM

## 2020-08-14 PROCEDURE — 93306 TTE W/DOPPLER COMPLETE: CPT | Performed by: INTERNAL MEDICINE

## 2020-08-14 PROCEDURE — 93306 TTE W/DOPPLER COMPLETE: CPT

## 2020-08-18 ENCOUNTER — TELEPHONE (OUTPATIENT)
Dept: OBGYN CLINIC | Facility: HOSPITAL | Age: 75
End: 2020-08-18

## 2020-08-18 ENCOUNTER — TELEPHONE (OUTPATIENT)
Dept: GASTROENTEROLOGY | Facility: CLINIC | Age: 75
End: 2020-08-18

## 2020-08-19 ENCOUNTER — ANESTHESIA EVENT (OUTPATIENT)
Dept: GASTROENTEROLOGY | Facility: HOSPITAL | Age: 75
End: 2020-08-19

## 2020-08-20 ENCOUNTER — ANESTHESIA (OUTPATIENT)
Dept: GASTROENTEROLOGY | Facility: HOSPITAL | Age: 75
End: 2020-08-20

## 2020-08-20 ENCOUNTER — TELEPHONE (OUTPATIENT)
Dept: GASTROENTEROLOGY | Facility: CLINIC | Age: 75
End: 2020-08-20

## 2020-08-20 ENCOUNTER — HOSPITAL ENCOUNTER (OUTPATIENT)
Dept: GASTROENTEROLOGY | Facility: HOSPITAL | Age: 75
Setting detail: OUTPATIENT SURGERY
Discharge: HOME | End: 2020-08-20
Attending: INTERNAL MEDICINE | Admitting: INTERNAL MEDICINE
Payer: MEDICARE

## 2020-08-20 VITALS
TEMPERATURE: 97.6 F | SYSTOLIC BLOOD PRESSURE: 128 MMHG | DIASTOLIC BLOOD PRESSURE: 66 MMHG | WEIGHT: 161.82 LBS | RESPIRATION RATE: 20 BRPM | HEIGHT: 65 IN | BODY MASS INDEX: 26.96 KG/M2 | HEART RATE: 54 BPM | OXYGEN SATURATION: 97 %

## 2020-08-20 DIAGNOSIS — K86.9 PANCREATIC LESION: ICD-10-CM

## 2020-08-20 DIAGNOSIS — R68.81 EARLY SATIETY: Primary | ICD-10-CM

## 2020-08-20 PROBLEM — Z95.2 HISTORY OF MITRAL VALVE REPLACEMENT: Chronic | Status: ACTIVE | Noted: 2020-08-20

## 2020-08-20 PROBLEM — Q45.3 PANCREATIC ABNORMALITY: Status: ACTIVE | Noted: 2020-08-20

## 2020-08-20 PROBLEM — C50.919 BREAST CANCER (HCC): Status: ACTIVE | Noted: 2020-08-20

## 2020-08-20 PROCEDURE — 88305 TISSUE EXAM BY PATHOLOGIST: CPT | Performed by: PATHOLOGY

## 2020-08-20 PROCEDURE — 43239 EGD BIOPSY SINGLE/MULTIPLE: CPT | Performed by: INTERNAL MEDICINE

## 2020-08-20 RX ORDER — PROPOFOL 10 MG/ML
INJECTION, EMULSION INTRAVENOUS AS NEEDED
Status: DISCONTINUED | OUTPATIENT
Start: 2020-08-20 | End: 2020-08-20

## 2020-08-20 RX ORDER — LIDOCAINE HYDROCHLORIDE 10 MG/ML
INJECTION, SOLUTION EPIDURAL; INFILTRATION; INTRACAUDAL; PERINEURAL AS NEEDED
Status: DISCONTINUED | OUTPATIENT
Start: 2020-08-20 | End: 2020-08-20

## 2020-08-20 RX ORDER — SODIUM CHLORIDE, SODIUM LACTATE, POTASSIUM CHLORIDE, CALCIUM CHLORIDE 600; 310; 30; 20 MG/100ML; MG/100ML; MG/100ML; MG/100ML
75 INJECTION, SOLUTION INTRAVENOUS CONTINUOUS
Status: DISCONTINUED | OUTPATIENT
Start: 2020-08-20 | End: 2020-08-24 | Stop reason: HOSPADM

## 2020-08-20 RX ADMIN — PROPOFOL 100 MG: 10 INJECTION, EMULSION INTRAVENOUS at 07:40

## 2020-08-20 RX ADMIN — LIDOCAINE HYDROCHLORIDE 50 MG: 10 INJECTION, SOLUTION EPIDURAL; INFILTRATION; INTRACAUDAL; PERINEURAL at 07:40

## 2020-08-20 RX ADMIN — SODIUM CHLORIDE, SODIUM LACTATE, POTASSIUM CHLORIDE, AND CALCIUM CHLORIDE: .6; .31; .03; .02 INJECTION, SOLUTION INTRAVENOUS at 07:26

## 2020-08-20 NOTE — H&P
History and Physical -  [facility]  Oral Bower 69 y o  female MRN: [id number]                  HPI: Oral Bower is a 69y o  year old female who presents for EGD for early satiety, anemia of undetermined etiology  REVIEW OF SYSTEMS: Per the HPI, and otherwise unremarkable  Historical Information   Past Medical History:   Diagnosis Date    Atrial fibrillation ([facility] [address] [facility]     Breast cancer ([facility] [address] [facility]     Diverticulitis-recent 6/5/2020    Tick bite with subsequent removal of tick 5/28/2019    Last Assessment & Plan:  On doxy so no need for lymes testing or additional antibiotic     Past Surgical History:   Procedure Laterality Date    APPENDECTOMY      BREAST SURGERY      L mastectomy    CARDIAC VALVE REPLACEMENT      MV    HYSTERECTOMY      MASTECTOMY      left breast    TONSILLECTOMY       Social History   Social History     Substance and Sexual Activity   Alcohol Use Yes    Frequency: 2-4 times a month    Comment: occasionally     Social History     Substance and Sexual Activity   Drug Use Never     Social History     Tobacco Use   Smoking Status Never Smoker   Smokeless Tobacco Never Used     Family History   Problem Relation Age of Onset    Diabetes Mother     Cancer Mother     Colon cancer Father     Colon cancer Paternal [de-identified]     Colon cancer Paternal Grandfather        Meds/Allergies     (Not in a hospital admission)      Allergies   Allergen Reactions    Penicillins Anaphylaxis     Other reaction(s): Anaphylaxis      Iodides      Other reaction(s): Rash         Objective     There were no vitals taken for this visit  PHYSICAL EXAM    Gen: NAD  CV: RRR    Positive mechanical valve click  CHEST: Clear  ABD: soft, NT/ND  EXT: no edema  Neuro: AAO      ASSESSMENT/PLAN:  This is a 69y o  year old female here for early satiety, anemia    PLAN:   Procedure:  EGD

## 2020-08-20 NOTE — INTERVAL H&P NOTE
H&P reviewed  After examining the patient I find no changes in the patients condition since the H&P had been written      Vitals:    08/20/20 0717   BP: 162/70   Pulse: 62   Resp: 16   Temp: 97 6 °F (36 4 °C)   SpO2: 98%

## 2020-08-20 NOTE — ANESTHESIA PREPROCEDURE EVALUATION
Procedure:  EGD    Relevant Problems   ANESTHESIA (within normal limits)   (-) History of anesthesia complications      CARDIO   (+) Atrial fibrillation (HCC)   (+) History of mitral valve replacement (mechanical, last dose Coumadin 2 days ago and did not bridge with lovenox)   (-) Angina at rest [facility])   (-) Angina of effort (HCC)      ENDO   (+) Other specified hypothyroidism      GI/HEPATIC  Confirmed NPO appropriate   (+) Pancreatic abnormality      /RENAL (within normal limits)      GYN   (+) Breast cancer (HCC)      HEMATOLOGY   (+) Acute blood loss anemia      MUSCULOSKELETAL   (+) Arthritis   (+) Chronic midline low back pain with right-sided sciatica   (+) Lumbar spondylosis      NEURO/PSYCH   (+) Anxiety   (+) Chronic pain syndrome   (+) History of breast cancer      PULMONARY (within normal limits)   (-) Smoking   (-) URI (upper respiratory infection)        Physical Exam    Airway    Mallampati score: II  TM Distance: >3 FB  Neck ROM: full     Dental   No notable dental hx     Cardiovascular  Rhythm: regular, Rate: normal, Cardiovascular exam normal    Pulmonary  Pulmonary exam normal Breath sounds clear to auscultation,     Other Findings        Anesthesia Plan  ASA Score- 3     Anesthesia Type- IV sedation with anesthesia with ASA Monitors  Additional Monitors:   Airway Plan:     Comment: I discussed the risks and benefits of IV sedation anesthesia including the possibility of the need to convert to general anesthesia and the potential risk of awareness  The patient was given the opportunity to ask questions, which were answered          Plan Factors-Exercise tolerance (METS): >4 METS  Chart reviewed  Patient is not a current smoker  Induction- intravenous  Postoperative Plan-     Informed Consent- Anesthetic plan and risks discussed with patient  I personally reviewed this patient with the CRNA  Discussed and agreed on the Anesthesia Plan with the CRNA  Eloise Patino

## 2020-08-20 NOTE — DISCHARGE INSTRUCTIONS
Upper Endoscopy   WHAT YOU NEED TO KNOW:   An upper endoscopy is also called an upper gastrointestinal (GI) endoscopy, or an esophagogastroduodenoscopy (EGD)  You may feel bloated, gassy, or have some abdominal discomfort after your procedure  Your throat may be sore for 24 to 36 hours  You may burp or pass gas from air that is still inside your body  DISCHARGE INSTRUCTIONS:   Call 911 for any of the following:   · You have sudden chest pain or trouble breathing  Seek care immediately if:   · You feel dizzy or faint  · You have trouble swallowing  · Your bowel movements are very dark or black  · Your abdomen is hard and firm and you have severe pain  · You vomit blood  Contact your healthcare provider if:   · You feel full or bloated and cannot burp or pass gas  · You have not had a bowel movement for 3 days after your procedure  · You have neck pain  · You have a fever or chills  · You have nausea or are vomiting  · You have a rash or hives  · You have questions or concerns about your endoscopy  Relieve a sore throat:  Suck on throat lozenges or crushed ice  Gargle with a small amount of warm salt water  Mix 1 teaspoon of salt and 1 cup of warm water to make salt water  Relieve gas and discomfort from bloating:  Lie on your right side with a heating pad on your abdomen  Take short walks to help pass gas  Eat small meals until bloating is relieved  Rest after your procedure: You have been given medicine to relax you  Do not  drive or make important decisions until the day after your procedure  Return to your normal activity as directed  You can usually return to work the day after your procedure  Follow up with your healthcare provider as directed:  Write down your questions so you remember to ask them during your visits     © 2017 [address] is for End User's use only and may not be sold, redistributed or otherwise used for commercial purposes  All illustrations and images included in CareNotes® are the copyrighted property of [employer] M [employer]  or [employer]  The above information is an  only  It is not intended as medical advice for individual conditions or treatments  Talk to your doctor, nurse or pharmacist before following any medical regimen to see if it is safe and effective for you

## 2020-08-20 NOTE — ANESTHESIA POSTPROCEDURE EVALUATION
Post-Op Assessment Note    CV Status:  Stable  Pain Score: 0    Pain management: adequate     Mental Status:  Somnolent   Hydration Status:  Euvolemic   PONV Controlled:  Controlled   Airway Patency:  Patent      Post Op Vitals Reviewed: Yes      Staff: CRNA         No complications documented      /54 (08/20/20 0748)    Temp 97 6 °F (36 4 °C) (08/20/20 0748)    Pulse (!) 52 (08/20/20 0748)   Resp 20 (08/20/20 0748)    SpO2 93 % (08/20/20 0748)

## 2020-08-24 ENCOUNTER — OFFICE VISIT (OUTPATIENT)
Dept: OBGYN CLINIC | Facility: CLINIC | Age: 75
End: 2020-08-24
Payer: MEDICARE

## 2020-08-24 VITALS
SYSTOLIC BLOOD PRESSURE: 110 MMHG | TEMPERATURE: 98.7 F | DIASTOLIC BLOOD PRESSURE: 67 MMHG | HEART RATE: 66 BPM | BODY MASS INDEX: 26.66 KG/M2 | WEIGHT: 160 LBS | HEIGHT: 65 IN

## 2020-08-24 DIAGNOSIS — I48.0 PAROXYSMAL ATRIAL FIBRILLATION (HCC): Primary | ICD-10-CM

## 2020-08-24 DIAGNOSIS — M17.0 PRIMARY OSTEOARTHRITIS OF BOTH KNEES: Primary | ICD-10-CM

## 2020-08-24 DIAGNOSIS — M54.16 RADICULOPATHY, LUMBAR REGION: ICD-10-CM

## 2020-08-24 DIAGNOSIS — M17.11 PRIMARY OSTEOARTHRITIS OF RIGHT KNEE: ICD-10-CM

## 2020-08-24 DIAGNOSIS — M22.2X2 PATELLOFEMORAL SYNDROME OF BOTH KNEES: ICD-10-CM

## 2020-08-24 DIAGNOSIS — R29.898 WEAKNESS OF BOTH HIPS: ICD-10-CM

## 2020-08-24 DIAGNOSIS — M48.00 CENTRAL STENOSIS OF SPINAL CANAL: ICD-10-CM

## 2020-08-24 DIAGNOSIS — M22.2X1 PATELLOFEMORAL SYNDROME OF BOTH KNEES: ICD-10-CM

## 2020-08-24 PROCEDURE — 99213 OFFICE O/P EST LOW 20 MIN: CPT | Performed by: FAMILY MEDICINE

## 2020-08-24 PROCEDURE — 1036F TOBACCO NON-USER: CPT | Performed by: FAMILY MEDICINE

## 2020-08-24 PROCEDURE — 1160F RVW MEDS BY RX/DR IN RCRD: CPT | Performed by: FAMILY MEDICINE

## 2020-08-24 PROCEDURE — 3008F BODY MASS INDEX DOCD: CPT | Performed by: FAMILY MEDICINE

## 2020-08-24 RX ORDER — AMIODARONE HYDROCHLORIDE 200 MG/1
200 TABLET ORAL DAILY
Qty: 90 TABLET | Refills: 3 | Status: SHIPPED | OUTPATIENT
Start: 2020-08-24 | End: 2021-08-14

## 2020-08-24 NOTE — PROGRESS NOTES
Assessment/Plan:  Assessment/Plan   Diagnoses and all orders for this visit:    Primary osteoarthritis of both knees  -     diclofenac sodium (VOLTAREN) 1 %; Apply 2 g topically 4 (four) times a day    Patellofemoral syndrome of both knees    Weakness of both hips    Central stenosis of spinal canal    Radiculopathy, lumbar region    Primary osteoarthritis of right knee  -     Injection procedure prior authorization; Future          80-year-old female with osteoarthritis of both knees with bilateral knee pain more than few years duration  Discussed with patient physical exam, impression and plan  Physical exam the right knee is noted for swelling  She has extension limited to -5° and flexion to 120°  Clinical impression that she is still symptomatic from osteoarthritis  She has not improved with Voltaren gel and corticosteroid injection  I discussed treatment option of viscosupplementation to which she agreed  We will request for one-shot viscosupplementation and she will return for injection once approved  Subjective:   Patient ID: Wilman Coronel is a 69 y o  female  Chief Complaint   Patient presents with    Right Knee - Pain, Follow-up, Swelling       80-year-old female with osteoarthritis of both knees following up for bilateral knee pain of few years duration  She was last seen by me 5 weeks ago at which point she underwent right knee aspiration and corticosteroid injection, she was provided with patellar stabilizing knee brace, and referred to formal physical therapy  She reports that following corticosteroid injection she experience significant improvement pain that lasted about 3 weeks  She reports that pain gradual return and is described as generalized to the knee but worse at the anterior medial aspect, achy and sometimes sharp, worse with going up and down stairs, worse with rising from seated position associated with swelling, and improved with resting or changing position    She has been wearing the knee brace when walking on uneven surfaces such as when in her yd, but is able to walk on level surfaces such as the grocery store  Knee Pain   This is a chronic problem  The current episode started more than 1 year ago  The problem occurs intermittently  The problem has been waxing and waning  Associated symptoms include arthralgias and joint swelling  Pertinent negatives include no numbness or weakness  The symptoms are aggravated by standing and walking (Stairs)  She has tried rest and position changes (Corticosteroid injection, Voltaren gel) for the symptoms  The treatment provided mild relief  Review of Systems   Musculoskeletal: Positive for arthralgias and joint swelling  Neurological: Negative for weakness and numbness  Objective:  Vitals:    08/24/20 1124   BP: 110/67   Pulse: 66   Temp: 98 7 °F (37 1 °C)   Weight: 72 6 kg (160 lb)   Height: 5' 5" (1 651 m)     Right Hip Exam     Muscle Strength   Flexion: 5/5       Left Hip Exam     Muscle Strength   Flexion: 5/5       Back Exam     Muscle Strength   Right Quadriceps:  5/5   Left Quadriceps:  5/5             Physical Exam  Vitals signs and nursing note reviewed  Constitutional:       General: She is not in acute distress  Appearance: She is well-developed  HENT:      Head: Normocephalic  Eyes:      Conjunctiva/sclera: Conjunctivae normal    Neck:      Trachea: No tracheal deviation  Cardiovascular:      Rate and Rhythm: Normal rate  Pulmonary:      Effort: Pulmonary effort is normal  No respiratory distress  Abdominal:      General: There is no distension  Skin:     General: Skin is warm and dry  Neurological:      Mental Status: She is alert and oriented to person, place, and time     Psychiatric:         Behavior: Behavior normal

## 2020-09-16 ENCOUNTER — TELEPHONE (OUTPATIENT)
Dept: OBGYN CLINIC | Facility: CLINIC | Age: 75
End: 2020-09-16

## 2020-09-17 PROBLEM — K57.90 DIVERTICULOSIS: Status: ACTIVE | Noted: 2020-09-17

## 2020-09-17 PROBLEM — H61.23 BILATERAL IMPACTED CERUMEN: Status: RESOLVED | Noted: 2020-06-18 | Resolved: 2020-09-17

## 2020-09-17 RX ORDER — DILTIAZEM HYDROCHLORIDE 120 MG/1
CAPSULE, COATED, EXTENDED RELEASE ORAL
COMMUNITY
Start: 2020-09-15 | End: 2021-07-01

## 2020-09-17 NOTE — PROGRESS NOTES
Tomasz Loza 1945 female MRN: [id number]      ASSESSMENT/PLAN  Problem List Items Addressed This Visit        Digestive    Pancreatic abnormality       Cardiovascular and Mediastinum    Atrial fibrillation (HCC)    Relevant Medications    diltiazem (CARDIZEM CD) 120 mg 24 hr capsule    metoprolol succinate (TOPROL-XL) 25 mg 24 hr tablet       Nervous and Auditory    Chronic midline low back pain with right-sided sciatica - Primary    Relevant Orders    Ambulatory referral to Physical Therapy       Musculoskeletal and Integument    Compression fracture of lumbar spine, non-traumatic, with routine healing, subsequent encounter       Other    Abdominal distention    Chronic pain syndrome    History of mitral valve replacement    Primary osteoarthritis of both knees    Relevant Orders    Ambulatory referral to Physical Therapy      Other Visit Diagnoses     Microscopic hematuria        Relevant Orders    Urinalysis with microscopic        Chronic LBP/Compression Fx: F/u with Pain Management for second round of injections  Rheumatology scheduled  Knee OA: F/u for viscosupplementation     Abdominal Distention: Scheduled for Gastric Emptying  Encouraged continued diet modification  AFib/Hx of MV Replacement: Improved symptomatically, and rate controlled on exam today       Hx of microscopic hematuria: Update UA with micro -- if (+), refer to Urology for cystoscopy and further evaluation     Encouraged to schedule mammogram    Future Appointments   Date Time Provider Patty Lee   10/13/2020 12:00 PM Jovanny Dow MD [facility]   10/23/2020  8:00 AM MO NM 1 MO NM [facility]   12/18/2020  9:00 AM DO DIONTE Wilson  [facility]          SUBJECTIVE  CC: 3 month follow up (Patient seen in office today for a 3 month follow up - patient was recently admitted for AFIB and yesterday was started on Metoprolol )      HPI:  Tomasz Loza is a 69 y o  female who presents for chronic follow up as detailed below  Chronic Low Back Pain, Compression Fx  Evaluated by Spine & Pain:   L3, L4, L5, S1 medial branch blocks -- completed    XR L-Spine: Scoliosis and degenerative changes  Severe L1 compression fracture  Refer to Rheum for RA, Osteoporosis -- scheduled for 10/13  Refer to Sports Med for knee injection -- saw Dr Itz Dejesus  XR showed B/L knee OA  Had aspiration and injection of R knee, referred to PT  No improvement with steroid injection, proceed with visco-supplementation  Still having significant knee pain on the R  Refer to Dr Ericka Berger to eval for kyphoplasty -- not yet scheduled     Abdominal Distention  Saw GI: Colonoscopy & EGD -- showed pan diverticulosis and mild pre-pyloric erosion  Start Famotidine BID x12 weeks  Schedule Gastric Emptying -- scheduled for 10/23   MRI to F/u pancreatic lesion:   Fatty replacement of uncinate process  4 mm dilated pancreatic side branch -- f/u q2year x5 years   Hepatic and renal cysts   L1 compression fx    AFib, Hx of MV Replacement   Cardiology:   Update ECHO: EF 65%, NRWA, Mild concentric hypertrophy, MV prosthesis   No medication regimen changes, continue INR F/u in [place]   Of note, pt admitted in [place] 9/14-9/15 due to palpations  Was found to be in AFib with RVR  Started on Metoprolol yesterday, in addition to Cardizem  Overall feels improved  Heart rate has slowed  She no longer has any dizziness  Review of Systems   Constitutional: Negative for appetite change (improved, trying to stay away from starchy, fatty foods and salt nad increasing veggies)  Respiratory: Negative for shortness of breath  Cardiovascular: Negative for chest pain, palpitations and leg swelling  Gastrointestinal: Negative for blood in stool  Genitourinary: Hematuria: previously had trace blood in her urine  Musculoskeletal: Positive for arthralgias (chronic B/L knee pain) and back pain (chronic)  Neurological: Negative for dizziness and light-headedness  Historical Information   The patient history was reviewed and updated as follows:    Past Medical History:   Diagnosis Date    Acute blood loss anemia 6/5/2020    Arm contusion, right 2/2 Coumadin toxicity 6/4/2020    Arm swelling 6/4/2020    [facility] fibrillation [facility] [address] [facility]     Bilateral impacted cerumen 6/18/2020    Breast cancer ([facility] [address] [facility]     Diverticulitis-recent 6/5/2020    Medicare annual wellness visit, subsequent 6/18/2020    Tick bite with subsequent removal of tick 5/28/2019    Last Assessment & Plan:  On doxy so no need for lymes testing or additional antibiotic     Past Surgical History:   Procedure Laterality Date    APPENDECTOMY      BREAST SURGERY      L mastectomy    CARDIAC VALVE REPLACEMENT      MV    COLONOSCOPY      HYSTERECTOMY      MASTECTOMY      left breast    TONSILLECTOMY       Family History   Problem Relation Age of Onset    Diabetes Mother     Cancer Mother     Colon cancer Father     Colon cancer Paternal Grandmother     Colon cancer Paternal Grandfather       Social History   Social History     Substance and Sexual Activity   Alcohol Use Yes    Frequency: 2-4 times a month    Comment: occasionally     Social History     Substance and Sexual Activity   Drug Use Never     Social History     Tobacco Use   Smoking Status Never Smoker   Smokeless Tobacco Never Used       Medications:     Current Outpatient Medications:     acetaminophen (TYLENOL) 325 mg tablet, Take 2 tablets (650 mg total) by mouth every 6 (six) hours as needed for mild pain, headaches or fever, Disp: 30 tablet, Rfl: 0    amiodarone 200 mg tablet, Take 1 tablet (200 mg total) by mouth daily, Disp: 90 tablet, Rfl: 3    diclofenac sodium (VOLTAREN) 1 %, Apply 2 g topically 4 (four) times a day, Disp: 1 Tube, Rfl: 2    enoxaparin (LOVENOX) 80 mg/0 8 mL, Prn procedure, Disp: , Rfl:     gabapentin (NEURONTIN) 100 mg capsule, Take 200 mg by mouth 2 (two) times a day , Disp: , Rfl:     levothyroxine 25 mcg tablet, Take 25 mcg by mouth daily, Disp: , Rfl:     LORazepam (ATIVAN) 1 mg tablet, Take 1 mg by mouth daily at bedtime, Disp: , Rfl:     melatonin 1 mg, Take 3 mg by mouth daily at bedtime, Disp: , Rfl:     metoprolol succinate (TOPROL-XL) 25 mg 24 hr tablet, Take 25 mg by mouth daily, Disp: , Rfl:     Probiotic Product (CULTURELLE PROBIOTICS PO), Take by mouth, Disp: , Rfl:     traZODone (DESYREL) 50 mg tablet, Take 50 mg by mouth daily at bedtime, Disp: , Rfl:     venlafaxine (EFFEXOR) 75 mg tablet, Take 75 mg by mouth daily , Disp: , Rfl:     warfarin (COUMADIN) 2 5 mg tablet, Every Monday, Wednesday and Friday, Disp: , Rfl: 0    warfarin (COUMADIN) 5 mg tablet, Take 1 tablet (5 mg total) by mouth see administration instructions Four days in a week  Every  Tuesday, Thursday, Saturday and Sunday, Disp: 30 tablet, Rfl: 0    calcium citrate-vitamin D (CITRACAL+D) 315-200 MG-UNIT per tablet, Take 1 tablet by mouth 2 (two) times a day, Disp: , Rfl:     calcium polycarbophil (FIBERCON) 625 mg tablet, Take 625 mg by mouth daily, Disp: , Rfl:     Cholecalciferol (D3 High Potency) 50 MCG (2000 UT) CAPS, Take by mouth, Disp: , Rfl:     diltiazem (CARDIZEM CD) 120 mg 24 hr capsule, TK 1 C PO QD, Disp: , Rfl:     gabapentin (NEURONTIN) 300 mg capsule, TK 2 CS PO QAM AND 2 CS PO QPM, Disp: , Rfl:     RA LAXATIVE powder, take 17GM (DISSOLVED IN WATER) by mouth once daily if needed for constipation, Disp: , Rfl:   Allergies   Allergen Reactions    Penicillins Anaphylaxis     Other reaction(s): Anaphylaxis      Iodides      Other reaction(s): Rash         OBJECTIVE    Vitals:   Vitals:    09/18/20 0841   BP: 122/80   Pulse: 96   Temp: (!) 97 °F (36 1 °C)   SpO2: 97%   Weight: 73 3 kg (161 lb 9 6 oz)   Height: 5' 5" (1 651 m)           Physical Exam  Vitals signs and nursing note reviewed  Constitutional:       General: She is not in acute distress  Appearance: Normal appearance     HENT:      Head: Normocephalic and atraumatic  Right Ear: Tympanic membrane and ear canal normal       Left Ear: Tympanic membrane and ear canal normal       Nose: Nose normal       Mouth/Throat:      Mouth: Mucous membranes are moist       Pharynx: No oropharyngeal exudate or posterior oropharyngeal erythema  Eyes:      Conjunctiva/sclera: Conjunctivae normal    Cardiovascular:      Rate and Rhythm: Normal rate and regular rhythm  Comments: Heart rate WNL, though high end of normal  Pulmonary:      Effort: Pulmonary effort is normal  No respiratory distress  Breath sounds: Normal breath sounds  Abdominal:      General: Bowel sounds are normal  There is no distension  Palpations: Abdomen is soft  Tenderness: There is no abdominal tenderness  Musculoskeletal:      Right lower leg: No edema  Left lower leg: No edema  Lymphadenopathy:      Cervical: No cervical adenopathy  Skin:     General: Skin is warm and dry  Neurological:      General: No focal deficit present  Mental Status: She is alert     Psychiatric:         Mood and Affect: Mood normal                     DO Jose Wilson [address] [facility]   9/18/2020  10:12 AM

## 2020-09-18 ENCOUNTER — OFFICE VISIT (OUTPATIENT)
Dept: FAMILY MEDICINE CLINIC | Facility: CLINIC | Age: 75
End: 2020-09-18
Payer: MEDICARE

## 2020-09-18 VITALS
TEMPERATURE: 97 F | SYSTOLIC BLOOD PRESSURE: 122 MMHG | OXYGEN SATURATION: 97 % | BODY MASS INDEX: 26.92 KG/M2 | HEIGHT: 65 IN | HEART RATE: 96 BPM | DIASTOLIC BLOOD PRESSURE: 80 MMHG | WEIGHT: 161.6 LBS

## 2020-09-18 DIAGNOSIS — M54.41 CHRONIC MIDLINE LOW BACK PAIN WITH RIGHT-SIDED SCIATICA: Primary | ICD-10-CM

## 2020-09-18 DIAGNOSIS — G89.4 CHRONIC PAIN SYNDROME: ICD-10-CM

## 2020-09-18 DIAGNOSIS — Z95.2 HISTORY OF MITRAL VALVE REPLACEMENT: Chronic | ICD-10-CM

## 2020-09-18 DIAGNOSIS — R14.0 ABDOMINAL DISTENTION: ICD-10-CM

## 2020-09-18 DIAGNOSIS — Q45.3 PANCREATIC ABNORMALITY: ICD-10-CM

## 2020-09-18 DIAGNOSIS — G89.29 CHRONIC MIDLINE LOW BACK PAIN WITH RIGHT-SIDED SCIATICA: Primary | ICD-10-CM

## 2020-09-18 DIAGNOSIS — R31.29 MICROSCOPIC HEMATURIA: ICD-10-CM

## 2020-09-18 DIAGNOSIS — M48.56XD COMPRESSION FRACTURE OF LUMBAR SPINE, NON-TRAUMATIC, WITH ROUTINE HEALING, SUBSEQUENT ENCOUNTER: ICD-10-CM

## 2020-09-18 DIAGNOSIS — M17.0 PRIMARY OSTEOARTHRITIS OF BOTH KNEES: ICD-10-CM

## 2020-09-18 DIAGNOSIS — I48.91 ATRIAL FIBRILLATION, UNSPECIFIED TYPE (HCC): ICD-10-CM

## 2020-09-18 PROBLEM — C50.919 BREAST CANCER (HCC): Status: RESOLVED | Noted: 2020-08-20 | Resolved: 2020-09-18

## 2020-09-18 PROBLEM — N28.1 RENAL CYST: Status: ACTIVE | Noted: 2020-09-18

## 2020-09-18 PROBLEM — K76.89 HEPATIC CYST: Status: ACTIVE | Noted: 2020-09-18

## 2020-09-18 PROBLEM — S40.021A ARM CONTUSION, RIGHT, INITIAL ENCOUNTER: Status: RESOLVED | Noted: 2020-06-04 | Resolved: 2020-09-18

## 2020-09-18 PROBLEM — Z00.00 MEDICARE ANNUAL WELLNESS VISIT, SUBSEQUENT: Status: RESOLVED | Noted: 2020-06-18 | Resolved: 2020-09-18

## 2020-09-18 PROBLEM — M79.89 ARM SWELLING: Status: RESOLVED | Noted: 2020-06-04 | Resolved: 2020-09-18

## 2020-09-18 PROBLEM — R22.41 LUMP OF SKIN OF RIGHT LOWER EXTREMITY: Status: RESOLVED | Noted: 2020-06-18 | Resolved: 2020-09-18

## 2020-09-18 PROBLEM — D62 ACUTE BLOOD LOSS ANEMIA: Status: RESOLVED | Noted: 2020-06-05 | Resolved: 2020-09-18

## 2020-09-18 LAB
BACTERIA UR QL AUTO: ABNORMAL /HPF
BILIRUB UR QL STRIP: NEGATIVE
CLARITY UR: CLEAR
COLOR UR: YELLOW
GLUCOSE UR STRIP-MCNC: NEGATIVE MG/DL
HGB UR QL STRIP.AUTO: ABNORMAL
HYALINE CASTS #/AREA URNS LPF: ABNORMAL /LPF
KETONES UR STRIP-MCNC: NEGATIVE MG/DL
LEUKOCYTE ESTERASE UR QL STRIP: ABNORMAL
NITRITE UR QL STRIP: NEGATIVE
NON-SQ EPI CELLS URNS QL MICRO: ABNORMAL /HPF
PH UR STRIP.AUTO: 6 [PH]
PROT UR STRIP-MCNC: NEGATIVE MG/DL
RBC #/AREA URNS AUTO: ABNORMAL /HPF
SP GR UR STRIP.AUTO: 1.02 (ref 1–1.03)
UROBILINOGEN UR QL STRIP.AUTO: 0.2 E.U./DL
WBC #/AREA URNS AUTO: ABNORMAL /HPF

## 2020-09-18 PROCEDURE — 99214 OFFICE O/P EST MOD 30 MIN: CPT | Performed by: FAMILY MEDICINE

## 2020-09-18 PROCEDURE — 81001 URINALYSIS AUTO W/SCOPE: CPT | Performed by: FAMILY MEDICINE

## 2020-09-18 RX ORDER — METOPROLOL SUCCINATE 25 MG/1
25 TABLET, EXTENDED RELEASE ORAL DAILY
COMMUNITY
End: 2021-07-01

## 2020-09-20 DIAGNOSIS — R31.29 MICROSCOPIC HEMATURIA: Primary | ICD-10-CM

## 2020-09-22 ENCOUNTER — OFFICE VISIT (OUTPATIENT)
Dept: OBGYN CLINIC | Facility: CLINIC | Age: 75
End: 2020-09-22
Payer: MEDICARE

## 2020-09-22 VITALS
DIASTOLIC BLOOD PRESSURE: 81 MMHG | WEIGHT: 161 LBS | BODY MASS INDEX: 26.82 KG/M2 | HEART RATE: 103 BPM | SYSTOLIC BLOOD PRESSURE: 118 MMHG | TEMPERATURE: 99.5 F | HEIGHT: 65 IN

## 2020-09-22 DIAGNOSIS — M17.11 PRIMARY OSTEOARTHRITIS OF RIGHT KNEE: Primary | ICD-10-CM

## 2020-09-22 PROCEDURE — 20610 DRAIN/INJ JOINT/BURSA W/O US: CPT | Performed by: FAMILY MEDICINE

## 2020-09-22 RX ORDER — LIDOCAINE HYDROCHLORIDE 10 MG/ML
3 INJECTION, SOLUTION INFILTRATION; PERINEURAL
Status: COMPLETED | OUTPATIENT
Start: 2020-09-22 | End: 2020-09-22

## 2020-09-22 RX ADMIN — LIDOCAINE HYDROCHLORIDE 3 ML: 10 INJECTION, SOLUTION INFILTRATION; PERINEURAL at 11:10

## 2020-09-22 NOTE — PROGRESS NOTES
Assessment/Plan:  Assessment/Plan   Diagnoses and all orders for this visit:    Primary osteoarthritis of right knee  -     Large joint arthrocentesis: R knee        52-year-old female with osteoarthritis of the right knee with right knee pain of more than few years duration  Discussed with patient physical exam, impression and plan  Physical exam noted for tenderness at the lateral joint line of the knee  She has full extension of the knee and flexion to 120°  Clinical impression is that she is symptomatic from osteoarthritis  She elected to proceed with viscosupplementation  I administered Synvisc-One injection to the right knee without complication  She was advised that in may take 3-4 weeks before she may notice effects of Visco supplement  If Visco supplement injection is to be repeated we must wait at least 6 months, however in the interim she may receive corticosteroid injection  She will follow up with me as needed  She was advised to follow up with Pain Management regards to treatment of her low back pain  Subjective:   Patient ID: Oral Bower is a 69 y o  female  Chief Complaint   Patient presents with    Right Knee - Pain, Follow-up       52-year-old female with osteoarthritis of right knee following up for right knee pain of more than few years duration  She was last seen by me 4 weeks ago at which point we submitted request for one-shot Visco supplement injection for the right knee  She presents today for Visco supplement injection  She reports having pain described generalized to the knee but worse at the anterior medial aspects, achy and sometimes sharp, worse with stairs and walking on uneven surfaces, associated with clicking/popping of the kneecap, associated instability, and improved with resting or changing position  She has been ambulating with walking cane  She has not yet started formal physical therapy  She takes Tylenol to help with pain      Knee Pain   This is a chronic problem  The current episode started more than 1 year ago  The problem occurs intermittently  The problem has been waxing and waning  Associated symptoms include arthralgias and joint swelling  Pertinent negatives include no numbness or weakness  The symptoms are aggravated by standing, twisting, walking and bending  She has tried rest, position changes and acetaminophen for the symptoms  The treatment provided mild relief  Review of Systems   Musculoskeletal: Positive for arthralgias and joint swelling  Neurological: Negative for weakness and numbness  Objective:  Vitals:    09/22/20 1051   BP: 118/81   Pulse: 103   Temp: 99 5 °F (37 5 °C)   Weight: 73 kg (161 lb)   Height: 5' 5" (1 651 m)     Right Knee Exam     Muscle Strength   The patient has normal right knee strength  Tenderness   The patient is experiencing tenderness in the lateral joint line  Range of Motion   Extension: normal   Flexion: 120     Other   Swelling: none            Physical Exam  Vitals signs and nursing note reviewed  Constitutional:       General: She is not in acute distress  Appearance: She is well-developed  HENT:      Head: Normocephalic  Eyes:      Conjunctiva/sclera: Conjunctivae normal    Neck:      Trachea: No tracheal deviation  Cardiovascular:      Rate and Rhythm: Normal rate  Pulmonary:      Effort: Pulmonary effort is normal  No respiratory distress  Abdominal:      General: There is no distension  Musculoskeletal:         General: Tenderness present  Skin:     General: Skin is warm and dry  Neurological:      Mental Status: She is alert and oriented to person, place, and time     Psychiatric:         Behavior: Behavior normal            Large joint arthrocentesis: R knee  Date/Time: 9/22/2020 11:10 AM  Consent given by: patient  Site marked: site marked  Timeout: Immediately prior to procedure a time out was called to verify the correct patient, procedure, equipment, support staff and site/side marked as required   Supporting Documentation  Indications: pain   Procedure Details  Location: knee - R knee  Preparation: Patient was prepped and draped in the usual sterile fashion  Needle gauge: 21G    Ultrasound guidance: no  Approach: anterolateral  Medications administered: 3 mL lidocaine 1 %; 48 mg hylan 48 MG/6ML    Patient tolerance: patient tolerated the procedure well with no immediate complications  Dressing:  Sterile dressing applied

## 2020-10-05 ENCOUNTER — TELEPHONE (OUTPATIENT)
Dept: OBGYN CLINIC | Facility: CLINIC | Age: 75
End: 2020-10-05

## 2020-10-13 ENCOUNTER — OFFICE VISIT (OUTPATIENT)
Dept: RHEUMATOLOGY | Facility: CLINIC | Age: 75
End: 2020-10-13
Payer: MEDICARE

## 2020-10-13 ENCOUNTER — TELEPHONE (OUTPATIENT)
Dept: OBGYN CLINIC | Facility: MEDICAL CENTER | Age: 75
End: 2020-10-13

## 2020-10-13 VITALS — TEMPERATURE: 98.4 F | SYSTOLIC BLOOD PRESSURE: 160 MMHG | HEART RATE: 60 BPM | DIASTOLIC BLOOD PRESSURE: 108 MMHG

## 2020-10-13 DIAGNOSIS — M15.0 PRIMARY GENERALIZED (OSTEO)ARTHRITIS: ICD-10-CM

## 2020-10-13 DIAGNOSIS — S22.070S CLOSED WEDGE COMPRESSION FRACTURE OF T9 VERTEBRA, SEQUELA: ICD-10-CM

## 2020-10-13 DIAGNOSIS — Z11.59 ENCOUNTER FOR SCREENING FOR OTHER VIRAL DISEASES: ICD-10-CM

## 2020-10-13 DIAGNOSIS — S32.010A CLOSED COMPRESSION FRACTURE OF BODY OF L1 VERTEBRA (HCC): ICD-10-CM

## 2020-10-13 DIAGNOSIS — M80.08XA AGE-RELATED OSTEOPOROSIS WITH CURRENT PATHOLOGICAL FRACTURE, VERTEBRA(E), INITIAL ENCOUNTER FOR FRACTURE (HCC): ICD-10-CM

## 2020-10-13 DIAGNOSIS — I48.91 ATRIAL FIBRILLATION, UNSPECIFIED TYPE (HCC): Primary | ICD-10-CM

## 2020-10-13 DIAGNOSIS — S32.030S CLOSED COMPRESSION FRACTURE OF L3 LUMBAR VERTEBRA, SEQUELA: ICD-10-CM

## 2020-10-13 DIAGNOSIS — Z87.39 HISTORY OF RHEUMATOID ARTHRITIS: Primary | ICD-10-CM

## 2020-10-13 DIAGNOSIS — M80.00XA AGE-RELATED OSTEOPOROSIS WITH CURRENT PATHOLOGICAL FRACTURE, UNSPECIFIED SITE, INITIAL ENCOUNTER FOR FRACTURE: ICD-10-CM

## 2020-10-13 DIAGNOSIS — Z85.3 HISTORY OF BREAST CANCER: ICD-10-CM

## 2020-10-13 PROCEDURE — 99205 OFFICE O/P NEW HI 60 MIN: CPT | Performed by: INTERNAL MEDICINE

## 2020-10-13 RX ORDER — IRBESARTAN 150 MG/1
150 TABLET ORAL
Qty: 30 TABLET | Refills: 3 | Status: SHIPPED | OUTPATIENT
Start: 2020-10-13 | End: 2022-03-10

## 2020-10-16 ENCOUNTER — OFFICE VISIT (OUTPATIENT)
Dept: OBGYN CLINIC | Facility: CLINIC | Age: 75
End: 2020-10-16
Payer: MEDICARE

## 2020-10-16 VITALS
TEMPERATURE: 98.6 F | HEIGHT: 65 IN | HEART RATE: 116 BPM | WEIGHT: 165 LBS | SYSTOLIC BLOOD PRESSURE: 141 MMHG | BODY MASS INDEX: 27.49 KG/M2 | DIASTOLIC BLOOD PRESSURE: 83 MMHG

## 2020-10-16 DIAGNOSIS — M17.11 PRIMARY OSTEOARTHRITIS OF RIGHT KNEE: Primary | ICD-10-CM

## 2020-10-16 DIAGNOSIS — M25.561 CHRONIC PAIN OF RIGHT KNEE: ICD-10-CM

## 2020-10-16 DIAGNOSIS — G89.29 CHRONIC PAIN OF RIGHT KNEE: ICD-10-CM

## 2020-10-16 PROCEDURE — 99213 OFFICE O/P EST LOW 20 MIN: CPT | Performed by: FAMILY MEDICINE

## 2020-10-21 LAB
25(OH)D3+25(OH)D2 SERPL-MCNC: 39.3 NG/ML (ref 30–100)
ALBUMIN SERPL ELPH-MCNC: 3.9 G/DL (ref 2.9–4.4)
ALBUMIN/GLOB SERPL: 1.6 {RATIO} (ref 0.7–1.7)
ALPHA1 GLOB SERPL ELPH-MCNC: 0.2 G/DL (ref 0–0.4)
ALPHA2 GLOB SERPL ELPH-MCNC: 0.5 G/DL (ref 0.4–1)
B-GLOBULIN SERPL ELPH-MCNC: 1 G/DL (ref 0.7–1.3)
CCP IGA+IGG SERPL IA-ACNC: 8 UNITS (ref 0–19)
CRP SERPL-MCNC: 4 MG/L (ref 0–10)
ENA SS-A AB SER-ACNC: <0.2 AI (ref 0–0.9)
ENA SS-B AB SER-ACNC: <0.2 AI (ref 0–0.9)
ERYTHROCYTE [SEDIMENTATION RATE] IN BLOOD BY WESTERGREN METHOD: 11 MM/HR (ref 0–40)
GAMMA GLOB SERPL ELPH-MCNC: 0.8 G/DL (ref 0.4–1.8)
GLOBULIN SER CALC-MCNC: 2.5 G/DL (ref 2.2–3.9)
HBV CORE AB SERPL QL IA: NEGATIVE
HBV SURFACE AG SERPL QL IA: NEGATIVE
LABORATORY COMMENT REPORT: NORMAL
M PROTEIN SERPL ELPH-MCNC: NORMAL G/DL
PROT SERPL-MCNC: 6.4 G/DL (ref 6–8.5)
PTH-INTACT SERPL-MCNC: 30 PG/ML (ref 15–65)
RHEUMATOID FACT SERPL-ACNC: <10 IU/ML (ref 0–13.9)

## 2020-10-22 ENCOUNTER — TELEPHONE (OUTPATIENT)
Dept: UROLOGY | Facility: CLINIC | Age: 75
End: 2020-10-22

## 2020-10-22 ENCOUNTER — TELEPHONE (OUTPATIENT)
Dept: RHEUMATOLOGY | Facility: CLINIC | Age: 75
End: 2020-10-22

## 2020-10-23 ENCOUNTER — HOSPITAL ENCOUNTER (OUTPATIENT)
Dept: NUCLEAR MEDICINE | Facility: HOSPITAL | Age: 75
Discharge: HOME | End: 2020-10-23
Attending: INTERNAL MEDICINE
Payer: MEDICARE

## 2020-10-23 DIAGNOSIS — R68.81 EARLY SATIETY: ICD-10-CM

## 2020-10-23 PROCEDURE — G1004 CDSM NDSC: HCPCS

## 2020-10-23 PROCEDURE — 78264 GASTRIC EMPTYING IMG STUDY: CPT

## 2020-10-23 PROCEDURE — A9541 TC99M SULFUR COLLOID: HCPCS

## 2020-10-29 ENCOUNTER — HOSPITAL ENCOUNTER (OUTPATIENT)
Dept: MRI IMAGING | Facility: CLINIC | Age: 75
Discharge: HOME | End: 2020-10-29
Payer: MEDICARE

## 2020-10-29 DIAGNOSIS — M25.561 CHRONIC PAIN OF RIGHT KNEE: ICD-10-CM

## 2020-10-29 DIAGNOSIS — G89.29 CHRONIC PAIN OF RIGHT KNEE: ICD-10-CM

## 2020-10-29 PROCEDURE — 73721 MRI JNT OF LWR EXTRE W/O DYE: CPT

## 2020-10-29 PROCEDURE — G1004 CDSM NDSC: HCPCS

## 2020-11-03 ENCOUNTER — TELEPHONE (OUTPATIENT)
Dept: GASTROENTEROLOGY | Facility: CLINIC | Age: 75
End: 2020-11-03

## 2020-11-05 ENCOUNTER — OFFICE VISIT (OUTPATIENT)
Dept: OBGYN CLINIC | Facility: CLINIC | Age: 75
End: 2020-11-05
Payer: MEDICARE

## 2020-11-05 VITALS
BODY MASS INDEX: 27.32 KG/M2 | HEIGHT: 65 IN | SYSTOLIC BLOOD PRESSURE: 131 MMHG | TEMPERATURE: 97.8 F | HEART RATE: 132 BPM | WEIGHT: 164 LBS | DIASTOLIC BLOOD PRESSURE: 85 MMHG

## 2020-11-05 DIAGNOSIS — M25.561 CHRONIC PAIN OF RIGHT KNEE: ICD-10-CM

## 2020-11-05 DIAGNOSIS — M17.11 PRIMARY OSTEOARTHRITIS OF RIGHT KNEE: Primary | ICD-10-CM

## 2020-11-05 DIAGNOSIS — G89.29 CHRONIC PAIN OF RIGHT KNEE: ICD-10-CM

## 2020-11-05 PROCEDURE — 20610 DRAIN/INJ JOINT/BURSA W/O US: CPT | Performed by: FAMILY MEDICINE

## 2020-11-05 PROCEDURE — 99213 OFFICE O/P EST LOW 20 MIN: CPT | Performed by: FAMILY MEDICINE

## 2020-11-05 RX ORDER — LIDOCAINE HYDROCHLORIDE 10 MG/ML
2.5 INJECTION, SOLUTION INFILTRATION; PERINEURAL
Status: COMPLETED | OUTPATIENT
Start: 2020-11-05 | End: 2020-11-05

## 2020-11-05 RX ORDER — LIDOCAINE HYDROCHLORIDE 10 MG/ML
4 INJECTION, SOLUTION INFILTRATION; PERINEURAL
Status: COMPLETED | OUTPATIENT
Start: 2020-11-05 | End: 2020-11-05

## 2020-11-05 RX ORDER — TRIAMCINOLONE ACETONIDE 40 MG/ML
40 INJECTION, SUSPENSION INTRA-ARTICULAR; INTRAMUSCULAR
Status: COMPLETED | OUTPATIENT
Start: 2020-11-05 | End: 2020-11-05

## 2020-11-05 RX ADMIN — LIDOCAINE HYDROCHLORIDE 2.5 ML: 10 INJECTION, SOLUTION INFILTRATION; PERINEURAL at 11:38

## 2020-11-05 RX ADMIN — LIDOCAINE HYDROCHLORIDE 4 ML: 10 INJECTION, SOLUTION INFILTRATION; PERINEURAL at 11:38

## 2020-11-05 RX ADMIN — TRIAMCINOLONE ACETONIDE 40 MG: 40 INJECTION, SUSPENSION INTRA-ARTICULAR; INTRAMUSCULAR at 11:38

## 2020-11-09 ENCOUNTER — TELEPHONE (OUTPATIENT)
Dept: UROLOGY | Facility: CLINIC | Age: 75
End: 2020-11-09

## 2020-11-19 ENCOUNTER — OFFICE VISIT (OUTPATIENT)
Dept: GASTROENTEROLOGY | Facility: CLINIC | Age: 75
End: 2020-11-19
Payer: MEDICARE

## 2020-11-19 VITALS
HEIGHT: 67 IN | WEIGHT: 167.8 LBS | BODY MASS INDEX: 26.34 KG/M2 | DIASTOLIC BLOOD PRESSURE: 80 MMHG | TEMPERATURE: 96.4 F | HEART RATE: 61 BPM | SYSTOLIC BLOOD PRESSURE: 140 MMHG

## 2020-11-19 DIAGNOSIS — K30 DELAYED GASTRIC EMPTYING: ICD-10-CM

## 2020-11-19 DIAGNOSIS — K21.9 GASTROESOPHAGEAL REFLUX DISEASE, UNSPECIFIED WHETHER ESOPHAGITIS PRESENT: Primary | ICD-10-CM

## 2020-11-19 DIAGNOSIS — K25.9 GASTRIC EROSION, UNSPECIFIED CHRONICITY: ICD-10-CM

## 2020-11-19 PROCEDURE — 99214 OFFICE O/P EST MOD 30 MIN: CPT | Performed by: PHYSICIAN ASSISTANT

## 2020-11-19 RX ORDER — LEVOTHYROXINE SODIUM 0.07 MG/1
TABLET ORAL
COMMUNITY
Start: 2020-11-09

## 2020-11-19 RX ORDER — FAMOTIDINE 20 MG/1
20 TABLET, FILM COATED ORAL 2 TIMES DAILY
Qty: 60 TABLET | Refills: 1 | Status: SHIPPED | OUTPATIENT
Start: 2020-11-19 | End: 2020-12-21

## 2020-11-20 ENCOUNTER — OFFICE VISIT (OUTPATIENT)
Dept: FAMILY MEDICINE CLINIC | Facility: CLINIC | Age: 75
End: 2020-11-20
Payer: MEDICARE

## 2020-11-20 VITALS
SYSTOLIC BLOOD PRESSURE: 126 MMHG | TEMPERATURE: 98.9 F | DIASTOLIC BLOOD PRESSURE: 80 MMHG | HEIGHT: 65 IN | BODY MASS INDEX: 27.99 KG/M2 | HEART RATE: 64 BPM | WEIGHT: 168 LBS | OXYGEN SATURATION: 94 %

## 2020-11-20 DIAGNOSIS — M48.56XD COMPRESSION FRACTURE OF LUMBAR SPINE, NON-TRAUMATIC, WITH ROUTINE HEALING, SUBSEQUENT ENCOUNTER: ICD-10-CM

## 2020-11-20 DIAGNOSIS — L25.9 CONTACT DERMATITIS, UNSPECIFIED CONTACT DERMATITIS TYPE, UNSPECIFIED TRIGGER: Primary | ICD-10-CM

## 2020-11-20 DIAGNOSIS — Z78.0 POSTMENOPAUSAL ESTROGEN DEFICIENCY: ICD-10-CM

## 2020-11-20 DIAGNOSIS — Z13.820 SCREENING FOR OSTEOPOROSIS: ICD-10-CM

## 2020-11-20 PROCEDURE — 99213 OFFICE O/P EST LOW 20 MIN: CPT | Performed by: FAMILY MEDICINE

## 2020-11-20 RX ORDER — METHYLPREDNISOLONE 4 MG/1
TABLET ORAL
Qty: 21 EACH | Refills: 0 | Status: SHIPPED | OUTPATIENT
Start: 2020-11-20 | End: 2021-07-01

## 2020-11-24 ENCOUNTER — OFFICE VISIT (OUTPATIENT)
Dept: PAIN MEDICINE | Facility: CLINIC | Age: 75
End: 2020-11-24
Payer: MEDICARE

## 2020-11-24 VITALS
RESPIRATION RATE: 18 BRPM | HEIGHT: 65 IN | HEART RATE: 58 BPM | DIASTOLIC BLOOD PRESSURE: 70 MMHG | BODY MASS INDEX: 27.32 KG/M2 | SYSTOLIC BLOOD PRESSURE: 117 MMHG | WEIGHT: 164 LBS

## 2020-11-24 DIAGNOSIS — G89.29 CHRONIC PAIN OF RIGHT KNEE: ICD-10-CM

## 2020-11-24 DIAGNOSIS — G89.4 CHRONIC PAIN SYNDROME: Primary | ICD-10-CM

## 2020-11-24 DIAGNOSIS — M17.11 PRIMARY OSTEOARTHRITIS OF RIGHT KNEE: ICD-10-CM

## 2020-11-24 DIAGNOSIS — M25.561 CHRONIC PAIN OF RIGHT KNEE: ICD-10-CM

## 2020-11-24 PROCEDURE — 99214 OFFICE O/P EST MOD 30 MIN: CPT | Performed by: ANESTHESIOLOGY

## 2020-12-16 ENCOUNTER — HOSPITAL ENCOUNTER (OUTPATIENT)
Dept: RADIOLOGY | Facility: CLINIC | Age: 75
Discharge: HOME | End: 2020-12-16
Attending: ANESTHESIOLOGY | Admitting: ANESTHESIOLOGY
Payer: MEDICARE

## 2020-12-16 VITALS
TEMPERATURE: 97.5 F | HEART RATE: 58 BPM | DIASTOLIC BLOOD PRESSURE: 67 MMHG | RESPIRATION RATE: 20 BRPM | OXYGEN SATURATION: 96 % | SYSTOLIC BLOOD PRESSURE: 138 MMHG

## 2020-12-16 DIAGNOSIS — M25.561 CHRONIC PAIN OF RIGHT KNEE: ICD-10-CM

## 2020-12-16 DIAGNOSIS — G89.4 CHRONIC PAIN SYNDROME: ICD-10-CM

## 2020-12-16 DIAGNOSIS — M17.11 PRIMARY OSTEOARTHRITIS OF RIGHT KNEE: ICD-10-CM

## 2020-12-16 DIAGNOSIS — G89.29 CHRONIC PAIN OF RIGHT KNEE: ICD-10-CM

## 2020-12-16 PROCEDURE — 64454 NJX AA&/STRD GNCLR NRV BRNCH: CPT | Performed by: ANESTHESIOLOGY

## 2020-12-16 RX ORDER — LIDOCAINE HYDROCHLORIDE 10 MG/ML
10 INJECTION, SOLUTION EPIDURAL; INFILTRATION; INTRACAUDAL; PERINEURAL ONCE
Status: COMPLETED | OUTPATIENT
Start: 2020-12-16 | End: 2020-12-16

## 2020-12-16 RX ADMIN — IOHEXOL 2.5 ML: 300 INJECTION, SOLUTION INTRAVENOUS at 09:54

## 2020-12-16 RX ADMIN — LIDOCAINE HYDROCHLORIDE 8 ML: 10 INJECTION, SOLUTION EPIDURAL; INFILTRATION; INTRACAUDAL; PERINEURAL at 09:56

## 2020-12-21 DIAGNOSIS — K21.9 GASTROESOPHAGEAL REFLUX DISEASE, UNSPECIFIED WHETHER ESOPHAGITIS PRESENT: ICD-10-CM

## 2020-12-21 RX ORDER — FAMOTIDINE 20 MG/1
TABLET, FILM COATED ORAL
Qty: 60 TABLET | Refills: 1 | Status: SHIPPED | OUTPATIENT
Start: 2020-12-21 | End: 2021-03-16

## 2020-12-22 ENCOUNTER — HOSPITAL ENCOUNTER (OUTPATIENT)
Dept: MAMMOGRAPHY | Facility: CLINIC | Age: 75
Discharge: HOME | End: 2020-12-22
Payer: MEDICARE

## 2020-12-22 VITALS — BODY MASS INDEX: 27.31 KG/M2 | WEIGHT: 160 LBS | HEIGHT: 64 IN

## 2020-12-22 DIAGNOSIS — Z12.39 SCREENING FOR BREAST CANCER: ICD-10-CM

## 2020-12-22 PROCEDURE — 77063 BREAST TOMOSYNTHESIS BI: CPT

## 2020-12-22 PROCEDURE — 77067 SCR MAMMO BI INCL CAD: CPT

## 2021-01-07 ENCOUNTER — HOSPITAL ENCOUNTER (OUTPATIENT)
Dept: MAMMOGRAPHY | Facility: CLINIC | Age: 76
Discharge: HOME | End: 2021-01-07
Payer: MEDICARE

## 2021-01-07 ENCOUNTER — TELEPHONE (OUTPATIENT)
Dept: RADIOLOGY | Facility: CLINIC | Age: 76
End: 2021-01-07

## 2021-01-07 DIAGNOSIS — M80.00XA AGE-RELATED OSTEOPOROSIS WITH CURRENT PATHOLOGICAL FRACTURE, UNSPECIFIED SITE, INITIAL ENCOUNTER FOR FRACTURE: ICD-10-CM

## 2021-01-07 DIAGNOSIS — M80.08XA AGE-RELATED OSTEOPOROSIS WITH CURRENT PATHOLOGICAL FRACTURE, VERTEBRA(E), INITIAL ENCOUNTER FOR FRACTURE (HCC): ICD-10-CM

## 2021-01-07 PROCEDURE — 77080 DXA BONE DENSITY AXIAL: CPT

## 2021-01-07 NOTE — TELEPHONE ENCOUNTER
ES PT:  S/P Right Knee Genicular NB #1 on 12/16/2020  Initially Less than 50% relief first 15 min, but then Pt reports 50-80% relief throughout rest of trial     OK to schedule #2?

## 2021-01-08 NOTE — TELEPHONE ENCOUNTER
S/w pt and scheduled R Knee Genicular NB #2 for 1/13/21 220 pm  Gave pre procedure instructions, masking/ policy and COVID screening

## 2021-01-13 ENCOUNTER — HOSPITAL ENCOUNTER (OUTPATIENT)
Dept: RADIOLOGY | Facility: CLINIC | Age: 76
Discharge: HOME | End: 2021-01-13
Attending: ANESTHESIOLOGY | Admitting: ANESTHESIOLOGY
Payer: MEDICARE

## 2021-01-13 VITALS
HEART RATE: 59 BPM | DIASTOLIC BLOOD PRESSURE: 70 MMHG | RESPIRATION RATE: 20 BRPM | TEMPERATURE: 97.6 F | SYSTOLIC BLOOD PRESSURE: 135 MMHG | OXYGEN SATURATION: 97 %

## 2021-01-13 DIAGNOSIS — M17.11 PRIMARY OSTEOARTHRITIS OF RIGHT KNEE: ICD-10-CM

## 2021-01-13 PROCEDURE — 64454 NJX AA&/STRD GNCLR NRV BRNCH: CPT | Performed by: ANESTHESIOLOGY

## 2021-01-13 RX ORDER — LIDOCAINE HYDROCHLORIDE 10 MG/ML
5 INJECTION, SOLUTION EPIDURAL; INFILTRATION; INTRACAUDAL; PERINEURAL ONCE
Status: COMPLETED | OUTPATIENT
Start: 2021-01-13 | End: 2021-01-13

## 2021-01-13 RX ORDER — BUPIVACAINE HCL/PF 2.5 MG/ML
5 VIAL (ML) INJECTION ONCE
Status: COMPLETED | OUTPATIENT
Start: 2021-01-13 | End: 2021-01-13

## 2021-01-13 RX ADMIN — LIDOCAINE HYDROCHLORIDE 5 ML: 10 INJECTION, SOLUTION EPIDURAL; INFILTRATION; INTRACAUDAL; PERINEURAL at 14:49

## 2021-01-13 RX ADMIN — Medication 5 ML: at 14:49

## 2021-01-13 RX ADMIN — IOHEXOL 3 ML: 300 INJECTION, SOLUTION INTRAVENOUS at 14:55

## 2021-01-13 NOTE — DISCHARGE INSTR - LAB

## 2021-01-13 NOTE — H&P
History of Present Illness:  The patient is a 77 y o  female who presents with complaints of right knee pain    Patient Active Problem List   Diagnosis    Atrial fibrillation ([facility] [address] )    Constipation    Compression fracture of lumbar spine, non-traumatic, with routine healing, subsequent encounter    History of breast cancer    Other specified hypothyroidism    Chronic midline low back pain with right-sided sciatica    Anxiety    Primary osteoarthritis of both knees    Chronic pain syndrome    Lumbar spondylosis    History of mitral valve replacement    Pancreatic abnormality    Diverticulosis    Abdominal distention    Hepatic cyst    Renal cyst    Chronic pain of right knee    Primary osteoarthritis of right knee       Past Medical History:   Diagnosis Date    Acute blood loss anemia 6/5/2020    Arm contusion, right 2/2 Coumadin toxicity 6/4/2020    Arm swelling 6/4/2020    Atrial fibrillation ([facility] [address] )     Bilateral impacted cerumen 6/18/2020    Breast cancer ([facility] [address] ) 1989    left    Diverticulitis-recent 6/5/2020    History of chemotherapy 1989    left breast    Medicare annual wellness visit, subsequent 6/18/2020    Tick bite with subsequent removal of tick 5/28/2019    Last Assessment & Plan:  On doxy so no need for lymes testing or additional antibiotic       Past Surgical History:   Procedure Laterality Date    APPENDECTOMY      AUGMENTATION MAMMAPLASTY Right 1989    implant    CARDIAC ELECTROPHYSIOLOGY STUDY, ESOPHAGEAL      CARDIAC VALVE REPLACEMENT      MV    COLONOSCOPY      HYSTERECTOMY  2002    MASTECTOMY Left 1989    OOPHORECTOMY Bilateral 2002    TONSILLECTOMY           Current Outpatient Medications:     acetaminophen (TYLENOL) 325 mg tablet, Take 2 tablets (650 mg total) by mouth every 6 (six) hours as needed for mild pain, headaches or fever, Disp: 30 tablet, Rfl: 0    amiodarone 200 mg tablet, Take 1 tablet (200 mg total) by mouth daily, Disp: 90 tablet, Rfl: 3   calcium citrate-vitamin D (CITRACAL+D) 315-200 MG-UNIT per tablet, Take 1 tablet by mouth 2 (two) times a day, Disp: , Rfl:     calcium polycarbophil (FIBERCON) 625 mg tablet, Take 625 mg by mouth daily, Disp: , Rfl:     Cholecalciferol (D3 High Potency) 50 MCG (2000 UT) CAPS, Take by mouth, Disp: , Rfl:     diclofenac sodium (VOLTAREN) 1 %, Apply 2 g topically 4 (four) times a day, Disp: 1 Tube, Rfl: 2    diltiazem (CARDIZEM CD) 120 mg 24 hr capsule, TK 1 C PO QD, Disp: , Rfl:     enoxaparin (LOVENOX) 80 mg/0 8 mL, Prn procedure, Disp: , Rfl:     famotidine (PEPCID) 20 mg tablet, take 1 tablet by mouth twice a day, Disp: 60 tablet, Rfl: 1    gabapentin (NEURONTIN) 100 mg capsule, Take 200 mg by mouth 2 (two) times a day , Disp: , Rfl:     gabapentin (NEURONTIN) 300 mg capsule, TK 2 CS PO QAM AND 2 CS PO QPM, Disp: , Rfl:     irbesartan (AVAPRO) 150 mg tablet, Take 1 tablet (150 mg total) by mouth daily at bedtime, Disp: 30 tablet, Rfl: 3    levothyroxine 25 mcg tablet, Take 25 mcg by mouth daily, Disp: , Rfl:     levothyroxine 75 mcg tablet, TK 1 T PO QAM OES FOR 14 DAYS, Disp: , Rfl:     LORazepam (ATIVAN) 1 mg tablet, Take 1 mg by mouth daily at bedtime, Disp: , Rfl:     melatonin 1 mg, Take 3 mg by mouth daily at bedtime, Disp: , Rfl:     methylPREDNISolone 4 MG tablet therapy pack, Use as directed on package, Disp: 21 each, Rfl: 0    metoprolol succinate (TOPROL-XL) 25 mg 24 hr tablet, Take 25 mg by mouth daily, Disp: , Rfl:     Probiotic Product (CULTURELLE PROBIOTICS PO), Take by mouth, Disp: , Rfl:     RA LAXATIVE powder, take 17GM (DISSOLVED IN WATER) by mouth once daily if needed for constipation, Disp: , Rfl:     traZODone (DESYREL) 50 mg tablet, Take 50 mg by mouth daily at bedtime, Disp: , Rfl:     venlafaxine (EFFEXOR) 75 mg tablet, Take 75 mg by mouth daily , Disp: , Rfl:     warfarin (COUMADIN) 2 5 mg tablet, Every Monday, Wednesday and Friday, Disp: , Rfl: 0    warfarin (COUMADIN) 5 mg tablet, Take 1 tablet (5 mg total) by mouth see administration instructions Four days in a week  Every  Tuesday, Thursday, Saturday and Sunday, Disp: 30 tablet, Rfl: 0    Allergies   Allergen Reactions    Penicillins Anaphylaxis     Other reaction(s): Anaphylaxis      Iodides      Other reaction(s): Rash         Physical Exam: There were no vitals filed for this visit  General: Awake, Alert, Oriented x 3, Mood and affect appropriate  Respiratory: Respirations even and unlabored  Cardiovascular: Peripheral pulses intact; no edema  Musculoskeletal Exam:  Tenderness over right knee    ASA Score: 2         Assessment:   1   Primary osteoarthritis of right knee        Plan: Right knee geniculate nerve block #2

## 2021-01-21 ENCOUNTER — TELEPHONE (OUTPATIENT)
Dept: PAIN MEDICINE | Facility: CLINIC | Age: 76
End: 2021-01-21

## 2021-01-21 NOTE — TELEPHONE ENCOUNTER
ES PT:  See below  S/W pt who states that she had relief from NB as follows  First hour: Less than 50%  Rest of trial; 50-80%  Next am: Less than 50%    Ok to schedule RFA?     (Pt will be in [place] from Feb 2-Feb 8th)

## 2021-01-21 NOTE — TELEPHONE ENCOUNTER
Patient   [phone]  Dr Montalvo    Patient is requesting a pain diary be mailed out to her  She misplaced hers   The address on file is correct

## 2021-01-22 NOTE — TELEPHONE ENCOUNTER
S/w pt and scheduled R Knee RFA for 2/24/21 @ 10 am  Gave pre procedure instructions, masking/ policy and COVID screening

## 2021-01-24 NOTE — PROGRESS NOTES
Assessment and Plan:   Ms Melvin Morocho is a 80-year-old female with history significant for possible rheumatoid arthritis and postmenopausal osteoporosis with resultant multiple spinal compression fractures, who presents for follow-up of these conditions       - Angela Land presents today for follow-up of a possible diagnosis of rheumatoid arthritis for which she was seen by Rheumatology approximately 1 and half years ago  She denies being started on DMARDs  To re-evaluate this diagnosis as she did not have convincing symptoms or signs for rheumatoid arthritis, we repeated her serologies and x-rays which were all unrevealing for an inflammatory arthritis  I advised her at this time she does not have rheumatoid arthritis and her diagnosis would be consistent with primary generalized osteoarthritis  She will manage with 2 tablets of Tylenol twice a day as needed and utilize the topical Voltaren gel as needed  I requested she follow up with Orthopedics and Pain Management for continued monitoring of the right knee advanced osteoarthritis      - In regards to the spinal compression fractures which is likely related to postmenopausal osteoporosis, we did obtain an updated DEXA scan which shows osteopenia but with a slightly elevated FRAX score  I am going to continue managing her for osteoporosis given the history of multiple spinal compression fractures  She will continue the calcium and vitamin-D supplements daily and we will add on oral alendronate 70 mg once weekly  I reviewed the method of administration with her and potential side effects including but not limited to risk for arthralgias, electrolyte abnormalities, GI upset, osteonecrosis of the jaw and atypical fractures  I will plan to repeat a DEXA scan every 1-2 years for monitoring  If she has any interim fractures I will likely discontinue the alendronate and start denosumab      - As she is only being managed for the osteoporosis from a rheumatological perspective I will see her back for a follow-up in 1 year        Plan:  Diagnoses and all orders for this visit:    Primary generalized (osteo)arthritis    Age-related osteoporosis without current pathological fracture  -     Discontinue: alendronate (FOSAMAX) 70 mg tablet; Take 1 tablet (70 mg total) by mouth every 7 days  -     alendronate (FOSAMAX) 70 mg tablet; Take 1 tablet (70 mg total) by mouth every 7 days    Closed compression fracture of body of L1 vertebra (HCC)    Closed compression fracture of L3 lumbar vertebra, sequela    Closed wedge compression fracture of T9 vertebra, sequela    History of breast cancer      Activities as tolerated  Exercise: try to maintain a low impact exercise regimen as much as possible  Walk for 30 minutes a day for at least 3 days a week  Continue other medications as prescribed by PCP and other specialists  RTC in 1 year  HPI    INITIAL VISIT NOTE (10/2020):  Ms Branden Desai is a 80-year-old female with history significant for possible rheumatoid arthritis and postmenopausal osteoporosis with resultant multiple spinal compression fractures, who presents for further evaluation of these conditions  She is referred by Dr Ines Reddy for a rheumatology consult      Patient reports she was seen by a rheumatologist approximately 1 and half years ago in [place] and was informed that she has positive rheumatoid serologies but there was uncertainty in regards to a diagnosis of rheumatoid arthritis  She was never started on DMARDs and her symptoms were managed with short courses of oral prednisone  She does not have any prior records available for today's appointment  In regards to her joint pains she states that this has been going on for approximately 2 years now and has primarily affected the base of her left thumb and the middle finger on her right hand  She does not experience joint pains throughout the rest of her fingers    She also describes an achiness in her bilateral shoulders  She has chronic pain of her right hip and right knee where she is known to have osteoarthritis and is currently following with Orthopedics  She did receive a right knee intra-articular cortisone injection which was ineffective and has started viscosupplementation  She denies any significant pain of her wrists, elbows, left hip, left knee, ankles or feet  She has noticed swelling only affecting her right knee  She experiences morning stiffness which mostly affects her right knee, right hip and bilateral shoulders, and takes a few hours to improve  She will take Tylenol on an as-needed basis which does help her but she tries to avoid doing this on a daily basis  She avoids NSAIDs as she is on chronic anticoagulation      In regards to the osteoporosis she mentions only being informed of this recently when she established with Pain Management for treatment of the spinal compression fractures  She denies any other history of fragility fractures  She attained menopause in her early 45s due to chemotherapy that was utilized for breast cancer  She does take daily calcium and vitamin-D supplements but does not perform any weight-bearing exercises  She is unsure of a family history of osteoporosis  She has not been on long-term chronic steroids      She denies fevers, unintentional weight loss, inflammatory eye disease, chronic skin rashes, psoriasis, inflammatory bowel disease or family history of autoimmune disease  She states her grandmother may have had osteoarthritis      Of note x-rays of her bilateral knees done in July 2020 showed osteoarthritis  An x-ray of her lumbar spine done in August 2020 showed severe compression fracture of L1 as well as compression of the superior endplate of L3       9/30/9508:  Patient presents for a follow-up today    We reviewed the labs which showed a normal rheumatoid factor, anti CCP antibody, ESR, CRP, Sjogren's antibodies, SPEP, vitamin-D, hepatitis panel and PTH  The x-rays of her hands, feet and left shoulder were normal   She also had an MRI of her right knee done which showed moderate to severe osteoarthritis and large complex tears of the medial and lateral menisci  We also reviewed the DEXA scan which showed osteopenia with a T-score of -2 3 of the left hip along with a FRAX score of 4% and 14% for a 10 year risk of hip and major osteoporotic fracture, respectively  She has been following with Orthopedics for the right knee osteoarthritis and received an intra-articular cortisone injection on 11/05/2020 which did not help  I advised her since she had the injection recently I would not be able to repeat this today  She was also referred to Dr Mignon Tovar for consideration of a nerve block as she may not be a surgical candidate due to her medical comorbidities  She does manage her symptoms with 2 tablets of Tylenol twice daily  The following portions of the patient's history were reviewed and updated as appropriate: allergies, current medications, past family history, past medical history, past social history, past surgical history and problem list       Review of Systems  Constitutional: Negative for weight change, fevers, chills, night sweats, fatigue  ENT/Mouth: Negative for hearing changes, ear pain, nasal congestion, sinus pain, hoarseness, sore throat, rhinorrhea, swallowing difficulty  Eyes: Negative for pain, redness, discharge, vision changes  Cardiovascular: Negative for chest pain, SOB, palpitations  Respiratory: Negative for cough, sputum, wheezing, dyspnea  Gastrointestinal: Negative for nausea, vomiting, diarrhea, constipation, pain, heartburn  Genitourinary: Negative for dysuria, urinary frequency, hematuria  Musculoskeletal: As per HPI  Skin: Negative for skin rash, color changes  Neuro: Negative for weakness, numbness, tingling, loss of consciousness  Psych: Negative for anxiety, depression     Heme/Lymph: Negative for easy bruising, bleeding, lymphadenopathy  Past Medical History:   Diagnosis Date    Acute blood loss anemia 6/5/2020    Arm contusion, right 2/2 Coumadin toxicity 6/4/2020    Arm swelling 6/4/2020    Atrial fibrillation ([facility] [address] )     Bilateral impacted cerumen 6/18/2020    Breast cancer ([facility] [address] ) 1989    left    Diverticulitis-recent 6/5/2020    History of chemotherapy 1989    left breast    Medicare annual wellness visit, subsequent 6/18/2020    Tick bite with subsequent removal of tick 5/28/2019    Last Assessment & Plan:  On doxy so no need for lymes testing or additional antibiotic       Past Surgical History:   Procedure Laterality Date    APPENDECTOMY      AUGMENTATION MAMMAPLASTY Right 1989    implant    CARDIAC ELECTROPHYSIOLOGY STUDY, ESOPHAGEAL      CARDIAC VALVE REPLACEMENT      MV    COLONOSCOPY      HYSTERECTOMY  2002    MASTECTOMY Left 1989    OOPHORECTOMY Bilateral 2002    TONSILLECTOMY         Social History     Socioeconomic History    Marital status:       Spouse name: Not on file    Number of children: Not on file    Years of education: Not on file    Highest education level: Not on file   Occupational History    Not on file   Social Needs    Financial resource strain: Not on file    Food insecurity     Worry: Not on file     Inability: Not on file    Transportation needs     Medical: Not on file     Non-medical: Not on file   Tobacco Use    Smoking status: Never Smoker    Smokeless tobacco: Never Used   Substance and Sexual Activity    Alcohol use: Yes     Frequency: 2-4 times a month     Comment: occasionally    Drug use: Never    Sexual activity: Not on file   Lifestyle    Physical activity     Days per week: Not on file     Minutes per session: Not on file    Stress: Not on file   Relationships    Social connections     Talks on phone: Not on file     Gets together: Not on file     Attends Evangelical service: Not on file     Active member of club or organization: Not on file     Attends meetings of clubs or organizations: Not on file     Relationship status: Not on file    Intimate partner violence     Fear of current or ex partner: Not on file     Emotionally abused: Not on file     Physically abused: Not on file     Forced sexual activity: Not on file   Other Topics Concern    Not on file   Social History Narrative    Lives with partner -- splits time between here and [facility]        Family History   Problem Relation Age of Onset    Diabetes Mother     Cancer Mother     Breast cancer Mother 40    Colon cancer Father     Colon cancer Paternal [de-identified]     Colon cancer Paternal Grandfather     No Known Problems Daughter     No Known Problems Daughter     Breast cancer Maternal Aunt 39    Breast cancer Maternal Aunt         age unknown   Junflor Montesinos Breast cancer Maternal Aunt         age unknown    Breast cancer Maternal Aunt         age unknown    No Known Problems Paternal Aunt        Allergies   Allergen Reactions    Penicillins Anaphylaxis     Other reaction(s):  Anaphylaxis      Iodides      Other reaction(s): Rash         Current Outpatient Medications:     acetaminophen (TYLENOL) 325 mg tablet, Take 2 tablets (650 mg total) by mouth every 6 (six) hours as needed for mild pain, headaches or fever, Disp: 30 tablet, Rfl: 0    alendronate (FOSAMAX) 70 mg tablet, Take 1 tablet (70 mg total) by mouth every 7 days, Disp: 4 tablet, Rfl: 5    amiodarone 200 mg tablet, Take 1 tablet (200 mg total) by mouth daily, Disp: 90 tablet, Rfl: 3    calcium citrate-vitamin D (CITRACAL+D) 315-200 MG-UNIT per tablet, Take 1 tablet by mouth 2 (two) times a day, Disp: , Rfl:     calcium polycarbophil (FIBERCON) 625 mg tablet, Take 625 mg by mouth daily, Disp: , Rfl:     Cholecalciferol (D3 High Potency) 50 MCG (2000 UT) CAPS, Take by mouth, Disp: , Rfl:     diclofenac sodium (VOLTAREN) 1 %, Apply 2 g topically 4 (four) times a day (Patient not taking: Reported on 1/25/2021), Disp: 1 Tube, Rfl: 2    diltiazem (CARDIZEM CD) 120 mg 24 hr capsule, TK 1 C PO QD, Disp: , Rfl:     enoxaparin (LOVENOX) 80 mg/0 8 mL, Prn procedure, Disp: , Rfl:     famotidine (PEPCID) 20 mg tablet, take 1 tablet by mouth twice a day, Disp: 60 tablet, Rfl: 1    gabapentin (NEURONTIN) 100 mg capsule, Take 200 mg by mouth 2 (two) times a day , Disp: , Rfl:     gabapentin (NEURONTIN) 300 mg capsule, TK 2 CS PO QAM AND 2 CS PO QPM, Disp: , Rfl:     irbesartan (AVAPRO) 150 mg tablet, Take 1 tablet (150 mg total) by mouth daily at bedtime, Disp: 30 tablet, Rfl: 3    levothyroxine 25 mcg tablet, Take 25 mcg by mouth daily, Disp: , Rfl:     levothyroxine 75 mcg tablet, TK 1 T PO QAM OES FOR 14 DAYS, Disp: , Rfl:     LORazepam (ATIVAN) 1 mg tablet, Take 1 mg by mouth daily at bedtime, Disp: , Rfl:     melatonin 1 mg, Take 3 mg by mouth daily at bedtime, Disp: , Rfl:     methylPREDNISolone 4 MG tablet therapy pack, Use as directed on package (Patient not taking: Reported on 1/25/2021), Disp: 21 each, Rfl: 0    metoprolol succinate (TOPROL-XL) 25 mg 24 hr tablet, Take 25 mg by mouth daily, Disp: , Rfl:     metoprolol succinate (TOPROL-XL) 50 mg 24 hr tablet, , Disp: , Rfl:     Probiotic Product (CULTURELLE PROBIOTICS PO), Take by mouth, Disp: , Rfl:     RA LAXATIVE powder, take 17GM (DISSOLVED IN WATER) by mouth once daily if needed for constipation, Disp: , Rfl:     traZODone (DESYREL) 50 mg tablet, Take 50 mg by mouth daily at bedtime, Disp: , Rfl:     venlafaxine (EFFEXOR) 75 mg tablet, Take 75 mg by mouth daily , Disp: , Rfl:     venlafaxine (EFFEXOR-XR) 75 mg 24 hr capsule, Take 75 mg by mouth daily, Disp: , Rfl:     warfarin (COUMADIN) 2 5 mg tablet, Every Monday, Wednesday and Friday, Disp: , Rfl: 0    warfarin (COUMADIN) 5 mg tablet, Take 1 tablet (5 mg total) by mouth see administration instructions Four days in a week    Every  Tuesday, Thursday, Saturday and Sunday, Disp: 30 tablet, Rfl: 0      Objective:    Vitals:    01/27/21 1329   BP: 142/82   Pulse: 58   Weight: 74 8 kg (165 lb)       Physical Exam  General: Well appearing, well nourished, in no distress  Oriented x 3, normal mood and affect  Ambulating without difficulty  Skin: Good turgor, no rash, unusual bruising or prominent lesions  Hair: Normal texture and distribution  Nails: Normal color, no deformities  HEENT:  Head: Normocephalic, atraumatic  Eyes: Conjunctiva clear, sclera non-icteric, EOM intact  Extremities: No amputations or deformities, cyanosis, edema  Neurologic: Alert and oriented  No focal neurological deficits appreciated  Psychiatric: Normal mood and affect  MALKA Greene    Rheumatology

## 2021-01-25 ENCOUNTER — OFFICE VISIT (OUTPATIENT)
Dept: CARDIOLOGY CLINIC | Facility: CLINIC | Age: 76
End: 2021-01-25
Payer: MEDICARE

## 2021-01-25 VITALS
BODY MASS INDEX: 28.17 KG/M2 | SYSTOLIC BLOOD PRESSURE: 110 MMHG | WEIGHT: 165 LBS | OXYGEN SATURATION: 97 % | HEART RATE: 55 BPM | RESPIRATION RATE: 18 BRPM | HEIGHT: 64 IN | DIASTOLIC BLOOD PRESSURE: 62 MMHG

## 2021-01-25 DIAGNOSIS — I48.0 PAROXYSMAL ATRIAL FIBRILLATION (HCC): Primary | ICD-10-CM

## 2021-01-25 DIAGNOSIS — E78.2 MIXED HYPERLIPIDEMIA: ICD-10-CM

## 2021-01-25 DIAGNOSIS — R42 DIZZINESS: ICD-10-CM

## 2021-01-25 DIAGNOSIS — Z95.2 H/O MITRAL VALVE REPLACEMENT WITH MECHANICAL VALVE: ICD-10-CM

## 2021-01-25 PROCEDURE — 99214 OFFICE O/P EST MOD 30 MIN: CPT | Performed by: INTERNAL MEDICINE

## 2021-01-25 RX ORDER — METOPROLOL SUCCINATE 50 MG/1
50 TABLET, EXTENDED RELEASE ORAL 2 TIMES DAILY
COMMUNITY
Start: 2021-01-23

## 2021-01-25 RX ORDER — VENLAFAXINE HYDROCHLORIDE 75 MG/1
75 CAPSULE, EXTENDED RELEASE ORAL DAILY
COMMUNITY
Start: 2021-01-14 | End: 2021-07-01

## 2021-01-25 NOTE — PROGRESS NOTES
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 [place] 23 mm in [place] in 2002, paroxysmal AFib postoperatively, hyperlipidemia, anxiety, breast cancer presented for cardiology follow-up    Since last clinic visit patient followed up with primary cardiologist in [place]  She was not feeling well and was noted to have uncontrolled blood pressure in November 2020  She also underwent cardiac stress test followed by successful NORMA guided electrical cardioversion  Patient has followed up with primary cardiologist in December 2020 and everything was okay    Today  Patient denies chest pain, palpitation, shortness of breath, leg edema, paroxysmal nocturnal dyspnea or loss of consciousness  She is feeling occasional dizziness especially on ambulation  Current medications reviewed with the patient  Patient was started on metoprolol and Cardizem since cardioversion   She is currently on amiodarone  She is following with primary cardiologist in [place] for INR check  She had a lab test done before a week and everything was okay as per patient which was done in [address] [place] [address]:   All review of system negative except as mentioned above    Patient Active Problem List   Diagnosis    Atrial fibrillation ([address] )    Constipation    Compression fracture of lumbar spine, non-traumatic, with routine healing, subsequent encounter    History of breast cancer    Other specified hypothyroidism    Chronic midline low back pain with right-sided sciatica    Anxiety    Primary osteoarthritis of both knees    Chronic pain syndrome    Lumbar spondylosis    History of mitral valve replacement    Pancreatic abnormality    Diverticulosis    Abdominal distention    Hepatic cyst    Renal cyst    Chronic pain of right knee    Primary osteoarthritis of right knee     Past Medical History:   Diagnosis Date    Acute blood loss anemia 6/5/2020    Arm contusion, right 2/2 Coumadin toxicity 6/4/2020    Arm swelling 6/4/2020    Atrial fibrillation [address] [address]     Bilateral impacted cerumen 6/18/2020    Breast cancer ([facility] [address] ) 1989    left    Diverticulitis-recent 6/5/2020    History of chemotherapy 1989    left breast    Medicare annual wellness visit, subsequent 6/18/2020    Tick bite with subsequent removal of tick 5/28/2019    Last Assessment & Plan:  On doxy so no need for lymes testing or additional antibiotic     Social History     Socioeconomic History    Marital status:       Spouse name: Not on file    Number of children: Not on file    Years of education: Not on file    Highest education level: Not on file   Occupational History    Not on file   Social Needs    Financial resource strain: Not on file    Food insecurity     Worry: Not on file     Inability: Not on file    Transportation needs     Medical: Not on file     Non-medical: Not on file   Tobacco Use    Smoking status: Never Smoker    Smokeless tobacco: Never Used   Substance and Sexual Activity    Alcohol use: Yes     Frequency: 2-4 times a month     Comment: occasionally    Drug use: Never    Sexual activity: Not on file   Lifestyle    Physical activity     Days per week: Not on file     Minutes per session: Not on file    Stress: Not on file   Relationships    Social connections     Talks on phone: Not on file     Gets together: Not on file     Attends [employer] witness service: Not on file     Active member of club or organization: Not on file     Attends meetings of clubs or organizations: Not on file     Relationship status: Not on file    Intimate partner violence     Fear of current or ex partner: Not on file     Emotionally abused: Not on file     Physically abused: Not on file     Forced sexual activity: Not on file   Other Topics Concern    Not on file   Social History Narrative    Lives with partner -- splits time between here and [facility]       Family History   Problem Relation Age of Onset    Diabetes Mother     Cancer Mother     Breast cancer Mother 40    Colon cancer Father     Colon cancer Paternal Grandmother     Colon cancer Paternal Grandfather     No Known Problems Daughter     No Known Problems Daughter     Breast cancer Maternal Aunt 39    Breast cancer Maternal Aunt         age unknown    Breast cancer Maternal Aunt         age unknown    Breast cancer Maternal Aunt         age unknown    No Known Problems Paternal Aunt      Past Surgical History:   Procedure Laterality Date    APPENDECTOMY      AUGMENTATION MAMMAPLASTY Right 1989    implant    CARDIAC ELECTROPHYSIOLOGY STUDY, ESOPHAGEAL      CARDIAC VALVE REPLACEMENT      MV    COLONOSCOPY      HYSTERECTOMY  2002    MASTECTOMY Left 1989    OOPHORECTOMY Bilateral 2002    TONSILLECTOMY         Current Outpatient Medications:     acetaminophen (TYLENOL) 325 mg tablet, Take 2 tablets (650 mg total) by mouth every 6 (six) hours as needed for mild pain, headaches or fever, Disp: 30 tablet, Rfl: 0    amiodarone 200 mg tablet, Take 1 tablet (200 mg total) by mouth daily, Disp: 90 tablet, Rfl: 3    calcium citrate-vitamin D (CITRACAL+D) 315-200 MG-UNIT per tablet, Take 1 tablet by mouth 2 (two) times a day, Disp: , Rfl:     calcium polycarbophil (FIBERCON) 625 mg tablet, Take 625 mg by mouth daily, Disp: , Rfl:     Cholecalciferol (D3 High Potency) 50 MCG (2000 UT) CAPS, Take by mouth, Disp: , Rfl:     diltiazem (CARDIZEM CD) 120 mg 24 hr capsule, TK 1 C PO QD, Disp: , Rfl:     famotidine (PEPCID) 20 mg tablet, take 1 tablet by mouth twice a day, Disp: 60 tablet, Rfl: 1    gabapentin (NEURONTIN) 100 mg capsule, Take 200 mg by mouth 2 (two) times a day , Disp: , Rfl:     levothyroxine 75 mcg tablet, TK 1 T PO QAM OES FOR 14 DAYS, Disp: , Rfl:     LORazepam (ATIVAN) 1 mg tablet, Take 1 mg by mouth daily at bedtime, Disp: , Rfl:     melatonin 1 mg, Take 3 mg by mouth daily at bedtime, Disp: , Rfl:     metoprolol succinate (TOPROL-XL) 50 mg 24 hr tablet, , Disp: , Rfl:     Probiotic Product (CULTURELLE PROBIOTICS PO), Take by mouth, Disp: , Rfl:     traZODone (DESYREL) 50 mg tablet, Take 50 mg by mouth daily at bedtime, Disp: , Rfl:     venlafaxine (EFFEXOR-XR) 75 mg 24 hr capsule, Take 75 mg by mouth daily, Disp: , Rfl:     warfarin (COUMADIN) 2 5 mg tablet, Every Monday, Wednesday and Friday, Disp: , Rfl: 0    diclofenac sodium (VOLTAREN) 1 %, Apply 2 g topically 4 (four) times a day (Patient not taking: Reported on 1/25/2021), Disp: 1 Tube, Rfl: 2    enoxaparin (LOVENOX) 80 mg/0 8 mL, Prn procedure, Disp: , Rfl:     gabapentin (NEURONTIN) 300 mg capsule, TK 2 CS PO QAM AND 2 CS PO QPM, Disp: , Rfl:     irbesartan (AVAPRO) 150 mg tablet, Take 1 tablet (150 mg total) by mouth daily at bedtime, Disp: 30 tablet, Rfl: 3    levothyroxine 25 mcg tablet, Take 25 mcg by mouth daily, Disp: , Rfl:     methylPREDNISolone 4 MG tablet therapy pack, Use as directed on package (Patient not taking: Reported on 1/25/2021), Disp: 21 each, Rfl: 0    metoprolol succinate (TOPROL-XL) 25 mg 24 hr tablet, Take 25 mg by mouth daily, Disp: , Rfl:     RA LAXATIVE powder, take 17GM (DISSOLVED IN WATER) by mouth once daily if needed for constipation, Disp: , Rfl:     venlafaxine (EFFEXOR) 75 mg tablet, Take 75 mg by mouth daily , Disp: , Rfl:     warfarin (COUMADIN) 5 mg tablet, Take 1 tablet (5 mg total) by mouth see administration instructions Four days in a week  Every  Tuesday, Thursday, Saturday and Sunday, Disp: 30 tablet, Rfl: 0  Allergies   Allergen Reactions    Penicillins Anaphylaxis     Other reaction(s):  Anaphylaxis      Iodides      Other reaction(s): Rash         Labs:  Orders Only on 10/19/2020   Component Date Value    Protein, Total 10/19/2020 6 4     Albumin, Electrophoresis 10/19/2020 3 9     Alpha-1 Globulin 10/19/2020 0 2     Alpha-2 Globulin 10/19/2020 0 5     Beta Globulin 10/19/2020 1 0     Gamma Globulin 10/19/2020 0 8     M-Blayne 10/19/2020 Not Observed     Globulin 10/19/2020 2 5     Albumin/Globulin Ratio 10/19/2020 1 6     Please note 10/19/2020 Comment     SS-A (RO) Ab 10/19/2020 <0 2     SS-B (LA) Ab 10/19/2020 <0 2     RA Latex Turbid 10/19/2020 <10 0     25-HYDROXY VIT D 71/01/4947 16 7     Cyclic Citrullin Peptide* 10/19/2020 8     Sedimentation Rate 10/19/2020 11     HBsAg Screen 10/19/2020 Negative     C-Reactive Protein, Quant 10/19/2020 4     Hep B Core Total Ab 10/19/2020 Negative     PTH, Intact 10/19/2020 30    Office Visit on 09/18/2020   Component Date Value    Clarity, UA 09/18/2020 Clear     Color, UA 09/18/2020 Yellow     Specific Gravity, UA 09/18/2020 1 016     pH, UA 09/18/2020 6 0     Glucose, UA 09/18/2020 Negative     Ketones, UA 09/18/2020 Negative     Blood, UA 09/18/2020 Moderate*    Protein, UA 09/18/2020 Negative     Nitrite, UA 09/18/2020 Negative     Bilirubin, UA 09/18/2020 Negative     Urobilinogen, UA 09/18/2020 0 2     Leukocytes, UA 09/18/2020 Moderate*    WBC, UA 09/18/2020 10-20*    RBC, UA 09/18/2020 4-10*    Hyaline Casts, UA 09/18/2020 None Seen     Bacteria, UA 09/18/2020 None Seen     Epithelial Cells 09/18/2020 None Seen    Hospital Outpatient Visit on 08/20/2020   Component Date Value    Case Report 08/20/2020                      Value:Surgical Pathology Report                         Case: [id number] Authorizing Provider:  Sid Yang MD      Collected:           08/20/2020 0744              Ordering Location:      [facility]       Received:            08/20/2020 2015 [facility] Endoscopy                                                             Pathologist:           Esperanza Dukes MD                                                         Specimens:   A) - Stomach, antrum                                                                                B) - Stomach, body                                                                                  C) - Stomach, fundus                                                                       Final Diagnosis 08/20/2020                      Value: This result contains rich text formatting which cannot be displayed here   Additional Information 08/20/2020                      Value: This result contains rich text formatting which cannot be displayed here  Maxi Bowles Description 08/20/2020                      Value: This result contains rich text formatting which cannot be displayed here   Clinical Information 08/20/2020                      Value:Cold bx r/o H pylori     Imaging: Dxa Bone Density Spine Hip And Pelvis    Result Date: 1/8/2021  Narrative: CENTRAL  DXA SCAN CLINICAL HISTORY:   77year old post-menopausal  female risk factors include family history of low impact parenteral hip fracture  Breast carcinoma  Effexor use  Osteoporosis screening  TECHNIQUE: Bone densitometry was performed using a Hologic Horizon A bone densitometer  Regions of interest appear properly placed  There are no obvious fractures or other confounding variables which could limit the study  Degenerative changes of the lumbar spine and hip  This will falsely elevate the bone mineral densities in these regions  Scoliotic curvature, concave right    Stable vague lucency in the superior, weight bearing portion of the left acetabulum, not significantly changed from prior CT abdomen pelvis of June 5, 2020, consistent with osteopenia  COMPARISON:  None  RESULTS: LUMBAR SPINE:  L1-L4: BMD 1 093 gm/cm2 T-score 0 4 Z-score 2 8 LUMBAR SPINE L2-L4 (average) : BMD 1 079 gm/sq-cm T-score is 0 0 Z-score is 2 4 LEFT TOTAL HIP: BMD 0 853 gm/cm2 T-score -0 7 Z-score 1 0 LEFT FEMORAL NECK: BMD 0 592 gm/cm2 T-score -2 3 Z-score -0 3 LEFT FOREARM : BMD 0 634 gm/sq-cm, T-score is  -0 9 Z-score is  1 7      Impression: 1  Based on the [facility] classification, the T-score of -2 3 in the left hip is consistent with LOW BONE MINERAL DENSITY (OSTEOPENIA)  2   Any secondary causes of low bone mineral density should be excluded prior to treatment, if clinically indicated  3   A daily intake of at least 1200 mg calcium and 800 - 1000 IU of Vitamin D, as well as weight bearing and muscle strengthening exercise, fall prevention and avoidance of tobacco and excessive alcohol intake as basic preventive measures are suggested  The 10 year risk of hip fracture is 4%, with the 10 year risk of major osteoporotic fracture being 14%, as calculated by the [facility] fracture risk assessment tool (FRAX)  The current NOF guidelines recommend treating patients with FRAX 10 year risk score of  >3% for hip fracture and >20% for major osteoporotic fracture  WHO CLASSIFICATION: Normal (a T-score of -1 0 or higher) Low bone mineral density (a T-score of less than -1 0 but higher than -2 5) Osteoporosis (a T-score of -2 5 or less) Severe osteoporosis (a T-score of -2 5 or less with a fragility fracture) Workstation performed: [id number]     Fl Spine And Pain Procedure    Result Date: 1/13/2021  Narrative: ATTENDING PHYSICIAN:  Marta Castillo MD  PROCEDURE:  Right geniculate nerve block x 3 under fluoroscopic guidance  PREPROCEDURE DIAGNOSIS:  Right knee pain  POSTPROCEDURE DIAGNOSIS:  Right knee pain  ANESTHESIA:  Local  ESTIMATED BLOOD LOSS:  None  COMPLICATIONS:  None   LOCATION: [facility], [place] [address]  CONSENT:  Today's procedure, its risks, benefits, and alternatives were explained in detail to the patient  Risks include, but are not limited to; bleeding, infection, weakness, nerve irritation or damage, hematoma formation, abscess formation, failure for the pain to improve, or potential worsening of the pain  The patient verbalized understanding and wished to proceed  Written informed consent was thereby obtained  DESCRIPTION OF THE PROCEDURE:  After written informed consent was obtained, the patient was taken to the fluoroscopy suite and placed in the supine position  Anatomical landmarks were identified with the aid of fluoroscopy in AP and lateral views  The patient's knee region was prepped with antiseptic solutions and draped in the usual sterile fashion  Strict aseptic technique was utilized  The skin and subcutaneous tissues at each needle entry site were infiltrated with a small amount of 1% preservative-free Lidocaine using 25 gauge 3 5 inch needles  Subsequently, 25 gauge 3 5 inch needles were advanced until os was contacted of the femoral and tibial shaft  This was noted to rest on the periosteum  After negative aspiration was confirmed and the points of needle placement confirmed with fluoroscopic guidance, 0 5 ml of contrast was injected  Subsequently, 1 ml of 0 25% bupivacaine was injected at the three separate points deemed to be the superior lateral genicular nerve, superior medial genicular nerve, and inferior medial genicular nerve  All needles were removed intact and hemostasis was maintained throughout  The patient tolerated the procedure well and no paresthesias or other complications were reported during or following this procedure  The antiseptic was cleaned and Band-Aids were placed as appropriate    The patient was transferred to the recovery area and observed for an appropriate period of time, during which the patient remained hemodynamically stable and neurovascularly intact as the patient was prior to the procedure  The patient was subsequently discharged home in stable condition with supervision  The patient was instructed to the call the office in a few days with an update  Discharge instructions were provided  I was present and participated in all key and critical portions of this procedure  Danyelle Delgadillo MD 1/13/2021 3:20 PM      Physical Exam:  General:  moderate built, awake, alert and oriented x3, not in distress  Neck: supple, no JVD  Eyes: PERRL, conjunctiva normal  Lungs:  Bilateral air entry positive, no wheeze/rhonchi or crackle  Heart:  Opening and closing of mitral prosthesis heard  Abdomen:  Soft ,nondistended ,nontender, bowel sounds positive  Extremities:  No leg edema, no deformity, ROM normal  Neuro:  Moving all extremities, speech clear  Skin: warm, no rash    /62   Pulse 55   Resp 18   Ht 5' 4" (1 626 m)   Wt 74 8 kg (165 lb)   SpO2 97%   BMI 28 32 kg/m²     Cardiographics :  EKG on last clinic visit sinus rhythm, normal axis  Echocardiogram in August 2020 showed EF 65%, no regional wall motion abnormality, mild concentric LVH, moderate to marked mitral annular calcification,  mitral prosthesis with normal function, trace AI, trace to mild TR, trace PI    Transesophageal echocardiogram in November 2020:  LVEF 60 65%, aortic sclerosis, mild-to-moderate AI, mechanical prosthetic mitral wall with mild MR, no LA appendage thrombus, successful cardioversion to sinus rhythm after 200 joule synchronized shock    No result available to review of recent cardiac stress test    Assessment:     1   Paroxysmal Afib  Status post recent NORMA guided electrical cardioversion in November 2020 [place]  Currently in sinus rhythm  On Coumadin    2  Dizziness  Patient feels intermittent dizziness on exertion  Denies any loss of consciousness  Her heart rate is 55 today    3   Rheumatic mitral stenosis status post prosthetic metallic mitral valve replaced in 2002  On Coumadin  Normally functioning well on recent echo and a transesophageal echocardiogram in 2020    4  Mild-to-moderate AI     5  Hyperlipidemia    Patient with family history of premature coronary artery disease  ASCVD risk score 13 4%  I offered patient statin for primary prophylaxis  repeat lipid function is pending     4  Left breast cancer status post chemo/mastectomy in 1989 in remission        Recommendations:      Patient is currently on multiple AV orion blocking agents and complains of intermittent dizziness  I would get 24 hour Holter monitoring to evaluate for heart rate including any significant pause  Further adjustment of AV orion blocking agent after reviewing Holter    Patient to continue amiodarone 200 mg daily, Cardizem 120 mg q d , irbesartan 150 mg, Coumadin, Toprol-XL 25 mg daily    Will try to obtain recent cardiac workup result including cardiology progress note and recent labs  Decision about starting statin after reviewing lipid panel    Patient would like to continue getting her INRs checked with primary cardiologist in [place]    Patient advised to call 911 or go to nearest ED if any worsening of dizziness or as needed  Return to clinic in 6 months or early as needed  Above all discussed with patient    Patient understands and agrees

## 2021-01-27 ENCOUNTER — OFFICE VISIT (OUTPATIENT)
Dept: RHEUMATOLOGY | Facility: CLINIC | Age: 76
End: 2021-01-27
Payer: MEDICARE

## 2021-01-27 VITALS
DIASTOLIC BLOOD PRESSURE: 82 MMHG | WEIGHT: 165 LBS | SYSTOLIC BLOOD PRESSURE: 142 MMHG | BODY MASS INDEX: 28.32 KG/M2 | HEART RATE: 58 BPM

## 2021-01-27 DIAGNOSIS — Z85.3 HISTORY OF BREAST CANCER: ICD-10-CM

## 2021-01-27 DIAGNOSIS — S22.070S CLOSED WEDGE COMPRESSION FRACTURE OF T9 VERTEBRA, SEQUELA: ICD-10-CM

## 2021-01-27 DIAGNOSIS — S32.030S CLOSED COMPRESSION FRACTURE OF L3 LUMBAR VERTEBRA, SEQUELA: ICD-10-CM

## 2021-01-27 DIAGNOSIS — S32.010A CLOSED COMPRESSION FRACTURE OF BODY OF L1 VERTEBRA (HCC): ICD-10-CM

## 2021-01-27 DIAGNOSIS — M81.0 AGE-RELATED OSTEOPOROSIS WITHOUT CURRENT PATHOLOGICAL FRACTURE: ICD-10-CM

## 2021-01-27 DIAGNOSIS — M15.0 PRIMARY GENERALIZED (OSTEO)ARTHRITIS: Primary | ICD-10-CM

## 2021-01-27 PROCEDURE — 99214 OFFICE O/P EST MOD 30 MIN: CPT | Performed by: INTERNAL MEDICINE

## 2021-01-27 RX ORDER — ALENDRONATE SODIUM 70 MG/1
70 TABLET ORAL
Qty: 4 TABLET | Refills: 5 | Status: SHIPPED | OUTPATIENT
Start: 2021-01-27 | End: 2021-01-27 | Stop reason: SDUPTHER

## 2021-01-27 RX ORDER — ALENDRONATE SODIUM 70 MG/1
70 TABLET ORAL
Qty: 4 TABLET | Refills: 5 | Status: SHIPPED | OUTPATIENT
Start: 2021-01-27 | End: 2021-05-04 | Stop reason: SDUPTHER

## 2021-01-27 NOTE — PATIENT INSTRUCTIONS
Alendronate (By mouth)   Alendronate ([id number])  Treats or prevents osteoporosis  Also treats Paget disease of the bone  Brand Name(s): Binosto Fosamax   There may be other brand names for this medicine  When This Medicine Should Not Be Used: This medicine is not right for everyone  Do not use it if you had an allergic reaction to alendronate, or if you have esophagus problems or trouble swallowing  Do not use it if you cannot stand or sit upright for at least 30 minutes after you take the medicine  How to Use This Medicine:   Liquid, Tablet, Fizzy Tablet  · Take this medicine in the morning on an empty stomach  Follow the directions exactly to lower the risk of esophagus problems  · Sit or stand while you take this medicine  Do not lie down for at least 30 minutes after you take the medicine, and do not lie down until after you have eaten  · Use plain water to take your medicine  The medicine may not work as well if you use other liquids  ? Tablet: Swallow whole with 6 to 8 ounces of water  Do not chew or suck on the tablet  ? Oral liquid: Measure the oral liquid medicine with a marked measuring spoon, oral syringe, or medicine cup  Drink at least 2 ounces (1/4 cup) of water after you take the liquid medicine  ? Effervescent tablet: Dissolve the tablet in 4 ounces of room temperature water  Wait at least 5 minutes after the bubbling stops  Then stir the solution for 10 seconds and drink it  · Wait at least 30 minutes after you take this medicine before you eat or drink or take any other medicine  This will help your body absorb the medicine  · This medicine should come with a Medication Guide  Ask your pharmacist for a copy if you do not have one  · Missed dose: Take a dose the next morning  Do not take 2 tablets on the same day  Then go back to your regular schedule  · Store the medicine in a closed container at room temperature, away from heat, moisture, and direct light   Keep the effervescent tablets in the blister pack until you are ready to use them  Drugs and Foods to Avoid:   Ask your doctor or pharmacist before using any other medicine, including over-the-counter medicines, vitamins, and herbal products  · Some medicines can affect how alendronate works  Tell your doctor if you are using any of the following:   ? Cancer medicines  ? NSAID pain or arthritis medicine (including aspirin, celecoxib, diclofenac, ibuprofen, naproxen)  ? Steroid medicine (including as hydrocortisone, methylprednisolone, prednisone, prednisolone, dexamethasone)  · Take alendronate at least 30 minutes before you take any other oral medicine, including aluminum, magnesium, iron, or calcium supplements, or antacids  Warnings While Using This Medicine:   · Tell your doctor if you are pregnant or breastfeeding, or if you have kidney disease, heartburn, anemia, blood clotting problems, ulcers or other stomach or bowel problems, a vitamin D deficiency, or a history of cancer  Tell your doctor if you have dental problems or if you wear dentures  Also tell your doctor if you smoke or drink alcohol  · This medicine may cause the following problems:   ? Damage to your esophagus  ? Increased risk for a thigh bone fracture  ? Low calcium levels  · Tell any doctor or dentist who treats you that you are using this medicine  This medicine may cause jaw problems, especially if you have a tooth pulled or other dental work  · The effervescent tablet contains sodium  Tell your doctor if you are on a low-salt diet  · Your doctor will check your progress and the effects of this medicine at regular visits  Keep all appointments  · Keep all medicine out of the reach of children  Never share your medicine with anyone    Possible Side Effects While Using This Medicine:   Call your doctor right away if you notice any of these side effects:  · Allergic reaction: Itching or hives, swelling in your face or hands, swelling or tingling in your mouth or throat, chest tightness, trouble breathing  · Blistering, peeling, red skin rash  · Chest pain, new or worsening heartburn, or a burning feeling in your throat  · Muscle spasms or twitching, tingling or numbness in your fingers, toes, or around your mouth  · Pain or difficulty when swallowing  · Pain, swelling, numbness, or a heavy feeling in your mouth or jaw, loose teeth, or other tooth problems  · Severe bone, joint, or muscle pain  · Unusual pain in your thigh, groin, or hip  If you notice these less serious side effects, talk with your doctor:   · Mild stomach pain or upset  If you notice other side effects that you think are caused by this medicine, tell your doctor  Call your doctor for medical advice about side effects  You may report side effects to FDA at 2-325-FDA-9154  © Copyright [address] [place] [address] Information is for End User's use only and may not be sold, redistributed or otherwise used for commercial purposes  The above information is an  only  It is not intended as medical advice for individual conditions or treatments  Talk to your doctor, nurse or pharmacist before following any medical regimen to see if it is safe and effective for you

## 2021-02-24 ENCOUNTER — TELEPHONE (OUTPATIENT)
Dept: RADIOLOGY | Facility: CLINIC | Age: 76
End: 2021-02-24

## 2021-02-24 ENCOUNTER — HOSPITAL ENCOUNTER (OUTPATIENT)
Dept: RADIOLOGY | Facility: CLINIC | Age: 76
Discharge: HOME | End: 2021-02-24
Attending: ANESTHESIOLOGY
Payer: MEDICARE

## 2021-02-24 VITALS
OXYGEN SATURATION: 94 % | DIASTOLIC BLOOD PRESSURE: 71 MMHG | TEMPERATURE: 97.7 F | RESPIRATION RATE: 20 BRPM | SYSTOLIC BLOOD PRESSURE: 148 MMHG | HEART RATE: 68 BPM

## 2021-02-24 DIAGNOSIS — M17.11 PRIMARY OSTEOARTHRITIS OF RIGHT KNEE: ICD-10-CM

## 2021-02-24 DIAGNOSIS — G89.4 CHRONIC PAIN SYNDROME: Primary | ICD-10-CM

## 2021-02-24 PROCEDURE — 64624 DSTRJ NULYT AGT GNCLR NRV: CPT | Performed by: ANESTHESIOLOGY

## 2021-02-24 RX ORDER — LIDOCAINE HYDROCHLORIDE 10 MG/ML
10 INJECTION, SOLUTION EPIDURAL; INFILTRATION; INTRACAUDAL; PERINEURAL ONCE
Status: COMPLETED | OUTPATIENT
Start: 2021-02-24 | End: 2021-02-24

## 2021-02-24 RX ADMIN — LIDOCAINE HYDROCHLORIDE 5 ML: 10 INJECTION, SOLUTION EPIDURAL; INFILTRATION; INTRACAUDAL; PERINEURAL at 10:18

## 2021-02-24 RX ADMIN — Medication 3 ML: at 10:25

## 2021-02-24 NOTE — DISCHARGE INSTR - LAB

## 2021-02-24 NOTE — H&P
History of Present Illness: The patient is a 77 y o  female who presents with complaints of right knee pain      Patient Active Problem List   Diagnosis    Atrial fibrillation ([facility] [address] )    Constipation    Compression fracture of lumbar spine, non-traumatic, with routine healing, subsequent encounter    History of breast cancer    Other specified hypothyroidism    Chronic midline low back pain with right-sided sciatica    Anxiety    Primary osteoarthritis of both knees    Chronic pain syndrome    Lumbar spondylosis    History of mitral valve replacement    Pancreatic abnormality    Diverticulosis    Abdominal distention    Hepatic cyst    Renal cyst    Chronic pain of right knee    Primary osteoarthritis of right knee       Past Medical History:   Diagnosis Date    Acute blood loss anemia 6/5/2020    Arm contusion, right 2/2 Coumadin toxicity 6/4/2020    Arm swelling 6/4/2020    Atrial fibrillation ([facility] [address] )     Bilateral impacted cerumen 6/18/2020    Breast cancer ([facility] [address] ) 1989    left    Diverticulitis-recent 6/5/2020    History of chemotherapy 1989    left breast    Medicare annual wellness visit, subsequent 6/18/2020    Tick bite with subsequent removal of tick 5/28/2019    Last Assessment & Plan:  On doxy so no need for lymes testing or additional antibiotic       Past Surgical History:   Procedure Laterality Date    APPENDECTOMY      AUGMENTATION MAMMAPLASTY Right 1989    implant    CARDIAC ELECTROPHYSIOLOGY STUDY, ESOPHAGEAL      CARDIAC VALVE REPLACEMENT      MV    COLONOSCOPY      HYSTERECTOMY  2002    MASTECTOMY Left 1989    OOPHORECTOMY Bilateral 2002    TONSILLECTOMY           Current Outpatient Medications:     acetaminophen (TYLENOL) 325 mg tablet, Take 2 tablets (650 mg total) by mouth every 6 (six) hours as needed for mild pain, headaches or fever, Disp: 30 tablet, Rfl: 0    alendronate (FOSAMAX) 70 mg tablet, Take 1 tablet (70 mg total) by mouth every 7 days, Disp: 4 tablet, Rfl: 5    amiodarone 200 mg tablet, Take 1 tablet (200 mg total) by mouth daily, Disp: 90 tablet, Rfl: 3    calcium citrate-vitamin D (CITRACAL+D) 315-200 MG-UNIT per tablet, Take 1 tablet by mouth 2 (two) times a day, Disp: , Rfl:     calcium polycarbophil (FIBERCON) 625 mg tablet, Take 625 mg by mouth daily, Disp: , Rfl:     Cholecalciferol (D3 High Potency) 50 MCG (2000 UT) CAPS, Take by mouth, Disp: , Rfl:     diclofenac sodium (VOLTAREN) 1 %, Apply 2 g topically 4 (four) times a day (Patient not taking: Reported on 1/25/2021), Disp: 1 Tube, Rfl: 2    diltiazem (CARDIZEM CD) 120 mg 24 hr capsule, TK 1 C PO QD, Disp: , Rfl:     enoxaparin (LOVENOX) 80 mg/0 8 mL, Prn procedure, Disp: , Rfl:     famotidine (PEPCID) 20 mg tablet, take 1 tablet by mouth twice a day, Disp: 60 tablet, Rfl: 1    gabapentin (NEURONTIN) 100 mg capsule, Take 200 mg by mouth 2 (two) times a day , Disp: , Rfl:     gabapentin (NEURONTIN) 300 mg capsule, TK 2 CS PO QAM AND 2 CS PO QPM, Disp: , Rfl:     irbesartan (AVAPRO) 150 mg tablet, Take 1 tablet (150 mg total) by mouth daily at bedtime, Disp: 30 tablet, Rfl: 3    levothyroxine 25 mcg tablet, Take 25 mcg by mouth daily, Disp: , Rfl:     levothyroxine 75 mcg tablet, TK 1 T PO QAM OES FOR 14 DAYS, Disp: , Rfl:     LORazepam (ATIVAN) 1 mg tablet, Take 1 mg by mouth daily at bedtime, Disp: , Rfl:     melatonin 1 mg, Take 3 mg by mouth daily at bedtime, Disp: , Rfl:     methylPREDNISolone 4 MG tablet therapy pack, Use as directed on package (Patient not taking: Reported on 1/25/2021), Disp: 21 each, Rfl: 0    metoprolol succinate (TOPROL-XL) 25 mg 24 hr tablet, Take 25 mg by mouth daily, Disp: , Rfl:     metoprolol succinate (TOPROL-XL) 50 mg 24 hr tablet, , Disp: , Rfl:     Probiotic Product (CULTURELLE PROBIOTICS PO), Take by mouth, Disp: , Rfl:     RA LAXATIVE powder, take 17GM (DISSOLVED IN WATER) by mouth once daily if needed for constipation, Disp: , Rfl:    traZODone (DESYREL) 50 mg tablet, Take 50 mg by mouth daily at bedtime, Disp: , Rfl:     venlafaxine (EFFEXOR) 75 mg tablet, Take 75 mg by mouth daily , Disp: , Rfl:     venlafaxine (EFFEXOR-XR) 75 mg 24 hr capsule, Take 75 mg by mouth daily, Disp: , Rfl:     warfarin (COUMADIN) 2 5 mg tablet, Every Monday, Wednesday and Friday, Disp: , Rfl: 0    warfarin (COUMADIN) 5 mg tablet, Take 1 tablet (5 mg total) by mouth see administration instructions Four days in a week  Every  Tuesday, Thursday, Saturday and Sunday, Disp: 30 tablet, Rfl: 0    Allergies   Allergen Reactions    Penicillins Anaphylaxis     Other reaction(s): Anaphylaxis      Iodides      Other reaction(s): Rash         Physical Exam:   Vitals:    02/24/21 1004   BP: 129/71   Pulse: 71   Resp: 20   Temp: 97 7 °F (36 5 °C)   SpO2: 96%     General: Awake, Alert, Oriented x 3, Mood and affect appropriate  Respiratory: Respirations even and unlabored  Cardiovascular: Peripheral pulses intact; no edema  Musculoskeletal Exam:  Tenderness over the right knee    ASA Score: 2    Patient/Chart Verification  Patient ID Verified: Verbal  ID Band Applied: No  Consents Confirmed: To be obtained in the Pre-Procedure area  H&P( within 30 days) Verified: To be obtained in the Pre-Procedure area  Interval H&P(within 24 hr) Complete (required for Outpatients and Surgery Admit only): To be obtained in the Pre-Procedure area  Allergies Reviewed: Yes  Anticoag/NSAID held?: No  Currently on antibiotics?: No  Pregnancy denied?: NA    Assessment:   1   Primary osteoarthritis of right knee        Plan: Right knee geniculate nerve RFA

## 2021-03-01 RX ORDER — GABAPENTIN 300 MG/1
600 CAPSULE ORAL 2 TIMES DAILY
Qty: 120 CAPSULE | Refills: 0 | Status: SHIPPED | OUTPATIENT
Start: 2021-03-01

## 2021-03-01 NOTE — TELEPHONE ENCOUNTER
S/w pt  States still has knee pain and states knee is warm  Pt admits to overdoing activity since RFA  Denies redness, drainage at injection sites  Advised pt to rest knee and apply ice  Pt takes prn tylenol for pain  Pt aware it may take 4-6 weeks for full benefit of RFA and will cb if symptoms worsen  Pt asking if [facility] can refill gabapentin to [facility] on [address] in [place]  Pt states she takes 300mg capsules, 2 in AM and 2 in PM   Previously prescribed by neurologist in [place]  Pt states she no longer follows with them as she can not travel that distance anymore  Pt states she ran out yesterday

## 2021-03-01 NOTE — TELEPHONE ENCOUNTER
Patient called returning the nurses call  Pt was advised of the below note   Please be advise thank you

## 2021-03-16 DIAGNOSIS — K21.9 GASTROESOPHAGEAL REFLUX DISEASE, UNSPECIFIED WHETHER ESOPHAGITIS PRESENT: ICD-10-CM

## 2021-03-16 RX ORDER — FAMOTIDINE 20 MG/1
TABLET, FILM COATED ORAL
Qty: 60 TABLET | Refills: 1 | Status: SHIPPED | OUTPATIENT
Start: 2021-03-16 | End: 2021-05-18

## 2021-03-24 ENCOUNTER — OFFICE VISIT (OUTPATIENT)
Dept: PAIN MEDICINE | Facility: CLINIC | Age: 76
End: 2021-03-24
Payer: MEDICARE

## 2021-03-24 VITALS
HEART RATE: 60 BPM | SYSTOLIC BLOOD PRESSURE: 136 MMHG | RESPIRATION RATE: 16 BRPM | HEIGHT: 64 IN | WEIGHT: 167.8 LBS | DIASTOLIC BLOOD PRESSURE: 73 MMHG | BODY MASS INDEX: 28.65 KG/M2

## 2021-03-24 DIAGNOSIS — G89.29 CHRONIC PAIN OF RIGHT KNEE: ICD-10-CM

## 2021-03-24 DIAGNOSIS — M17.11 PRIMARY OSTEOARTHRITIS OF RIGHT KNEE: ICD-10-CM

## 2021-03-24 DIAGNOSIS — M25.561 CHRONIC PAIN OF RIGHT KNEE: ICD-10-CM

## 2021-03-24 DIAGNOSIS — G89.4 CHRONIC PAIN SYNDROME: Primary | ICD-10-CM

## 2021-03-24 PROCEDURE — 99214 OFFICE O/P EST MOD 30 MIN: CPT | Performed by: ANESTHESIOLOGY

## 2021-03-24 NOTE — PROGRESS NOTES
Pain Medicine Follow-Up Note    Assessment:  1  Chronic pain syndrome    2  Chronic pain of right knee    3  Primary osteoarthritis of right knee        Plan:  Orders Placed This Encounter   Procedures    Ambulatory referral to Orthopedic Surgery     Standing Status:   Future     Standing Expiration Date:   3/24/2022     Referral Priority:   Routine     Referral Type:   Consult - AMB     Referral Reason:   Specialty Services Required     Referred to Provider:   Brook Sellers DO     Requested Specialty:   Orthopedic Surgery     Number of Visits Requested:   1     Expiration Date:   3/24/2022     My impressions and treatment recommendations were discussed in detail with the patient who verbalized understanding and had no further questions  The patient is reporting that her pain is primarily in her right knee  She did report that the right knee geniculate nerve radiofrequency ablation on February 24, 2021 gave her some pain relief, but her pain returned quickly thereafter  At this point, the patient has failed corticosteroid injections, viscosupplementation injections, and geniculate nerve radiofrequency ablations for her right knee  I do feel it reasonable at this point to have the patient see a joint replacement specialist for an evaluation regarding possible right total knee arthroplasty  Follow-up is planned on as-needed basis  Discharge instructions were provided  I personally saw and examined the patient and I agree with the above discussed plan of care  History of Present Illness:    Korey Flroes is a 77 y o  female who presents to [facility] for interval re-evaluation of the above stated pain complaints  The patient has a past medical and chronic pain history as outlined in the assessment section  She was last seen on  February 24, 2021 at which time she underwent a right knee geniculate nerve radiofrequency ablation      At today's office visit, the patient's pain score is 6 to 7/10 on the verbal numerical pain rating scale  The patient states that her pain is primarily in her right knee  She describes her pain as variable throughout the day and worse with activity  She reports that her pain is constant in nature and describes her pain as sharp  She reports that the right knee geniculate nerve radiofrequency ablation on February 24, 2021 did give her some pain relief, 30%, but she still has significant pain around her right knee  She is interested in speaking to a joint replacement specialist about proceeding with a right total knee arthroplasty  Other than as stated above, the patient denies any interval changes in medications, medical condition, mental condition, symptoms, or allergies since the last office visit  Review of Systems:    Review of Systems   Respiratory: Negative for shortness of breath  Cardiovascular: Negative for chest pain  Gastrointestinal: Negative for constipation, diarrhea, nausea and vomiting  Musculoskeletal: Negative for arthralgias, gait problem, joint swelling and myalgias  Decreased range of motion and pain in extremity   Skin: Negative for rash  Neurological: Negative for dizziness, seizures and weakness  All other systems reviewed and are negative          Patient Active Problem List   Diagnosis    Atrial fibrillation ([address] )    Constipation    Compression fracture of lumbar spine, non-traumatic, with routine healing, subsequent encounter    History of breast cancer    Other specified hypothyroidism    Chronic midline low back pain with right-sided sciatica    Anxiety    Primary osteoarthritis of both knees    Chronic pain syndrome    Lumbar spondylosis    History of mitral valve replacement    Pancreatic abnormality    Diverticulosis    Abdominal distention    Hepatic cyst    Renal cyst    Chronic pain of right knee    Primary osteoarthritis of right knee       Past Medical History:   Diagnosis Date    Acute blood loss anemia 6/5/2020    Arm contusion, right 2/2 Coumadin toxicity 6/4/2020    Arm swelling 6/4/2020    Atrial fibrillation ([facility])     Bilateral impacted cerumen 6/18/2020    Breast cancer ([address] ) 1989    left    Diverticulitis-recent 6/5/2020    History of chemotherapy 1989    left breast    Medicare annual wellness visit, subsequent 6/18/2020    Tick bite with subsequent removal of tick 5/28/2019    Last Assessment & Plan:  On doxy so no need for lymes testing or additional antibiotic       Past Surgical History:   Procedure Laterality Date    APPENDECTOMY      AUGMENTATION MAMMAPLASTY Right 1989    implant    CARDIAC ELECTROPHYSIOLOGY STUDY, ESOPHAGEAL      CARDIAC VALVE REPLACEMENT      MV    COLONOSCOPY      HYSTERECTOMY  2002    MASTECTOMY Left 1989    OOPHORECTOMY Bilateral 2002    TONSILLECTOMY         Family History   Problem Relation Age of Onset    Diabetes Mother     Cancer Mother     Breast cancer Mother 40    Colon cancer Father     Colon cancer Paternal Grandmother     Colon cancer Paternal Grandfather     No Known Problems Daughter     No Known Problems Daughter     Breast cancer Maternal Aunt 39    Breast cancer Maternal Aunt         age unknown    Breast cancer Maternal Aunt         age unknown    Breast cancer Maternal Aunt         age unknown    No Known Problems Paternal Aunt        Social History     Occupational History    Not on file   Tobacco Use    Smoking status: Never Smoker    Smokeless tobacco: Never Used   Substance and Sexual Activity    Alcohol use: Yes     Frequency: 2-4 times a month     Comment: occasionally    Drug use: Never    Sexual activity: Not on file         Current Outpatient Medications:     acetaminophen (TYLENOL) 325 mg tablet, Take 2 tablets (650 mg total) by mouth every 6 (six) hours as needed for mild pain, headaches or fever, Disp: 30 tablet, Rfl: 0    alendronate (FOSAMAX) 70 mg tablet, Take 1 tablet (70 mg total) by mouth every 7 days, Disp: 4 tablet, Rfl: 5    amiodarone 200 mg tablet, Take 1 tablet (200 mg total) by mouth daily, Disp: 90 tablet, Rfl: 3    calcium citrate-vitamin D (CITRACAL+D) 315-200 MG-UNIT per tablet, Take 1 tablet by mouth 2 (two) times a day, Disp: , Rfl:     calcium polycarbophil (FIBERCON) 625 mg tablet, Take 625 mg by mouth daily, Disp: , Rfl:     Cholecalciferol (D3 High Potency) 50 MCG (2000 UT) CAPS, Take by mouth, Disp: , Rfl:     diclofenac sodium (VOLTAREN) 1 %, Apply 2 g topically 4 (four) times a day, Disp: 1 Tube, Rfl: 2    diltiazem (CARDIZEM CD) 120 mg 24 hr capsule, TK 1 C PO QD, Disp: , Rfl:     enoxaparin (LOVENOX) 80 mg/0 8 mL, Prn procedure, Disp: , Rfl:     famotidine (PEPCID) 20 mg tablet, take 1 tablet by mouth twice a day, Disp: 60 tablet, Rfl: 1    gabapentin (NEURONTIN) 300 mg capsule, Take 2 capsules (600 mg total) by mouth 2 (two) times a day, Disp: 120 capsule, Rfl: 0    levothyroxine 25 mcg tablet, Take 25 mcg by mouth daily, Disp: , Rfl:     levothyroxine 75 mcg tablet, TK 1 T PO QAM OES FOR 14 DAYS, Disp: , Rfl:     LORazepam (ATIVAN) 1 mg tablet, Take 1 mg by mouth daily at bedtime, Disp: , Rfl:     melatonin 1 mg, Take 3 mg by mouth daily at bedtime, Disp: , Rfl:     methylPREDNISolone 4 MG tablet therapy pack, Use as directed on package, Disp: 21 each, Rfl: 0    metoprolol succinate (TOPROL-XL) 25 mg 24 hr tablet, Take 25 mg by mouth daily, Disp: , Rfl:     metoprolol succinate (TOPROL-XL) 50 mg 24 hr tablet, , Disp: , Rfl:     Probiotic Product (CULTURELLE PROBIOTICS PO), Take by mouth, Disp: , Rfl:     RA LAXATIVE powder, take 17GM (DISSOLVED IN WATER) by mouth once daily if needed for constipation, Disp: , Rfl:     traZODone (DESYREL) 50 mg tablet, Take 50 mg by mouth daily at bedtime, Disp: , Rfl:     venlafaxine (EFFEXOR) 75 mg tablet, Take 75 mg by mouth daily , Disp: , Rfl:     venlafaxine (EFFEXOR-XR) 75 mg 24 hr capsule, Take 75 mg by mouth daily, Disp: , Rfl:     warfarin (COUMADIN) 2 5 mg tablet, Every Monday, Wednesday and Friday, Disp: , Rfl: 0    irbesartan (AVAPRO) 150 mg tablet, Take 1 tablet (150 mg total) by mouth daily at bedtime, Disp: 30 tablet, Rfl: 3    warfarin (COUMADIN) 5 mg tablet, Take 1 tablet (5 mg total) by mouth see administration instructions Four days in a week  Every  Tuesday, Thursday, Saturday and Sunday, Disp: 30 tablet, Rfl: 0    Allergies   Allergen Reactions    Penicillins Anaphylaxis     Other reaction(s): Anaphylaxis      Iodides      Other reaction(s): Rash         Physical Exam:    /73   Pulse 60   Resp 16   Ht 5' 4" (1 626 m)   Wt 76 1 kg (167 lb 12 8 oz)   BMI 28 80 kg/m²     Constitutional:normal, well developed, well nourished, alert, in no distress and non-toxic and no overt pain behavior  Eyes:anicteric  HEENT:grossly intact  Neck:supple, symmetric, trachea midline and no masses   Pulmonary:even and unlabored  Cardiovascular:No edema or pitting edema present  Skin:Normal without rashes or lesions and well hydrated  Psychiatric:Mood and affect appropriate  Neurologic:Cranial Nerves II-XII grossly intact  Musculoskeletal:antalgic, tenderness noted both medially and laterally around the right knee      Orders Placed This Encounter   Procedures    Ambulatory referral to Orthopedic Surgery

## 2021-03-26 ENCOUNTER — TELEPHONE (OUTPATIENT)
Dept: PAIN MEDICINE | Facility: CLINIC | Age: 76
End: 2021-03-26

## 2021-03-26 NOTE — TELEPHONE ENCOUNTER
Pt called in to request her [facility] records to be sent to her address  Please be advise thank you        Please call patient back @ [phone]

## 2021-04-01 ENCOUNTER — CONSULT (OUTPATIENT)
Dept: UROLOGY | Facility: CLINIC | Age: 76
End: 2021-04-01
Payer: MEDICARE

## 2021-04-01 VITALS
DIASTOLIC BLOOD PRESSURE: 82 MMHG | SYSTOLIC BLOOD PRESSURE: 140 MMHG | HEIGHT: 64 IN | HEART RATE: 68 BPM | WEIGHT: 176 LBS | BODY MASS INDEX: 30.05 KG/M2

## 2021-04-01 DIAGNOSIS — N28.1 RENAL CYST: Primary | ICD-10-CM

## 2021-04-01 DIAGNOSIS — R31.29 MICROSCOPIC HEMATURIA: ICD-10-CM

## 2021-04-01 LAB
BACTERIA UR QL AUTO: ABNORMAL /HPF
BILIRUB UR QL STRIP: NEGATIVE
CLARITY UR: CLEAR
COLOR UR: YELLOW
GLUCOSE UR STRIP-MCNC: NEGATIVE MG/DL
HGB UR QL STRIP.AUTO: ABNORMAL
HYALINE CASTS #/AREA URNS LPF: ABNORMAL /LPF
KETONES UR STRIP-MCNC: NEGATIVE MG/DL
LEUKOCYTE ESTERASE UR QL STRIP: ABNORMAL
NITRITE UR QL STRIP: NEGATIVE
NON-SQ EPI CELLS URNS QL MICRO: ABNORMAL /HPF
PH UR STRIP.AUTO: 7 [PH]
POST-VOID RESIDUAL VOLUME, ML POC: 6 ML
PROT UR STRIP-MCNC: NEGATIVE MG/DL
RBC #/AREA URNS AUTO: ABNORMAL /HPF
SL AMB  POCT GLUCOSE, UA: NORMAL
SL AMB LEUKOCYTE ESTERASE,UA: NORMAL
SL AMB POCT BILIRUBIN,UA: NORMAL
SL AMB POCT BLOOD,UA: NORMAL
SL AMB POCT CLARITY,UA: CLEAR
SL AMB POCT COLOR,UA: YELLOW
SL AMB POCT KETONES,UA: NORMAL
SL AMB POCT NITRITE,UA: NORMAL
SL AMB POCT PH,UA: 7
SL AMB POCT SPECIFIC GRAVITY,UA: 1
SL AMB POCT URINE PROTEIN: NORMAL
SL AMB POCT UROBILINOGEN: 0.2
SP GR UR STRIP.AUTO: 1.01 (ref 1–1.03)
UROBILINOGEN UR QL STRIP.AUTO: 0.2 E.U./DL
WBC #/AREA URNS AUTO: ABNORMAL /HPF

## 2021-04-01 PROCEDURE — 51798 US URINE CAPACITY MEASURE: CPT | Performed by: PHYSICIAN ASSISTANT

## 2021-04-01 PROCEDURE — 87086 URINE CULTURE/COLONY COUNT: CPT | Performed by: PHYSICIAN ASSISTANT

## 2021-04-01 PROCEDURE — 81001 URINALYSIS AUTO W/SCOPE: CPT | Performed by: PHYSICIAN ASSISTANT

## 2021-04-01 PROCEDURE — 99213 OFFICE O/P EST LOW 20 MIN: CPT | Performed by: PHYSICIAN ASSISTANT

## 2021-04-01 PROCEDURE — 81002 URINALYSIS NONAUTO W/O SCOPE: CPT | Performed by: PHYSICIAN ASSISTANT

## 2021-04-01 NOTE — PROGRESS NOTES
1  Microscopic hematuria  Ambulatory referral to Urology    POCT urine dip    POCT Measure PVR         Assessment and plan:       1  Renal cysts  - US prior to follow up for continued surveillance    2  Microscopic hematuria  - repeat UA with microscopy  - negative MRI 7/2020  - plan for cystoscopy      Kenyatta Benjamin PA-C      Chief Complaint      new patient renal cyst    History of Present Illness     Piter Pina is a 77 y o  Female presenting today as a new patient for renal lesions microscopic hematuria  Patient had an MRI of the abdomen in July 2020 secondary to surveillance of a pancreatic lesion  MRI 7/28/2020:  KIDNEYS/PROXIMAL URETERS:  No hydroureteronephrosis  No suspicious renal mass  There are multiple bilateral renal cysts without solid enhancement  The largest on the right measures 3 6 x 4 5 cm  This cyst demonstrated a thin calcified septation on   prior CT  There is a 3 8 x 4 1 cm left upper pole cyst  This cyst demonstrates a single thin enhancing septation  UA with microscopy 9/18/2020 showing 4-10 RBC/hpf in absence of urinary infection  patient reports a longstanding history of microscopic hematuria  She reports having a cystoscopy performed over 30 years ago  Denies ever having gross hematuria  Denies any flank pain, dysuria, suprapubic pressure, bone pain, weight loss  Patient's main complaint includes some increased urinary frequency as well as mild urinary incontinence  postvoid residual of 6 mL    Laboratory     Lab Results   Component Value Date    CREATININE 0 84 06/12/2020       Review of Systems     Review of Systems   Constitutional: Negative for activity change, appetite change, chills, diaphoresis, fatigue, fever and unexpected weight change  Respiratory: Negative for chest tightness and shortness of breath  Cardiovascular: Negative for chest pain, palpitations and leg swelling     Gastrointestinal: Negative for abdominal distention, abdominal pain, constipation, diarrhea, nausea and vomiting  Genitourinary: Negative for decreased urine volume, difficulty urinating, dysuria, enuresis, flank pain, frequency, genital sores, hematuria and urgency  Musculoskeletal: Negative for back pain, gait problem and myalgias  Skin: Negative for color change, pallor, rash and wound  Psychiatric/Behavioral: Negative for behavioral problems  The patient is not nervous/anxious  Allergies     Allergies   Allergen Reactions    Penicillins Anaphylaxis     Other reaction(s): Anaphylaxis      Iodides      Other reaction(s): Rash         Physical Exam     Physical Exam  Constitutional:       General: She is not in acute distress  Appearance: Normal appearance  She is normal weight  She is not ill-appearing, toxic-appearing or diaphoretic  HENT:      Head: Normocephalic and atraumatic  Eyes:      General:         Right eye: No discharge  Left eye: No discharge  Conjunctiva/sclera: Conjunctivae normal    Pulmonary:      Effort: Pulmonary effort is normal  No respiratory distress  Skin:     General: Skin is warm and dry  Coloration: Skin is not jaundiced or pale  Neurological:      General: No focal deficit present  Mental Status: She is alert and oriented to person, place, and time  Psychiatric:         Mood and Affect: Mood normal          Behavior: Behavior normal          Thought Content: Thought content normal          Judgment: Judgment normal        Vital Signs     There were no vitals filed for this visit        Current Medications       Current Outpatient Medications:     acetaminophen (TYLENOL) 325 mg tablet, Take 2 tablets (650 mg total) by mouth every 6 (six) hours as needed for mild pain, headaches or fever, Disp: 30 tablet, Rfl: 0    alendronate (FOSAMAX) 70 mg tablet, Take 1 tablet (70 mg total) by mouth every 7 days, Disp: 4 tablet, Rfl: 5    amiodarone 200 mg tablet, Take 1 tablet (200 mg total) by mouth daily, Disp: 90 tablet, Rfl: 3    calcium citrate-vitamin D (CITRACAL+D) 315-200 MG-UNIT per tablet, Take 1 tablet by mouth 2 (two) times a day, Disp: , Rfl:     calcium polycarbophil (FIBERCON) 625 mg tablet, Take 625 mg by mouth daily, Disp: , Rfl:     Cholecalciferol (D3 High Potency) 50 MCG (2000 UT) CAPS, Take by mouth, Disp: , Rfl:     diclofenac sodium (VOLTAREN) 1 %, Apply 2 g topically 4 (four) times a day, Disp: 1 Tube, Rfl: 2    diltiazem (CARDIZEM CD) 120 mg 24 hr capsule, TK 1 C PO QD, Disp: , Rfl:     enoxaparin (LOVENOX) 80 mg/0 8 mL, Prn procedure, Disp: , Rfl:     famotidine (PEPCID) 20 mg tablet, take 1 tablet by mouth twice a day, Disp: 60 tablet, Rfl: 1    gabapentin (NEURONTIN) 300 mg capsule, Take 2 capsules (600 mg total) by mouth 2 (two) times a day, Disp: 120 capsule, Rfl: 0    irbesartan (AVAPRO) 150 mg tablet, Take 1 tablet (150 mg total) by mouth daily at bedtime, Disp: 30 tablet, Rfl: 3    levothyroxine 25 mcg tablet, Take 25 mcg by mouth daily, Disp: , Rfl:     levothyroxine 75 mcg tablet, TK 1 T PO QAM OES FOR 14 DAYS, Disp: , Rfl:     LORazepam (ATIVAN) 1 mg tablet, Take 1 mg by mouth daily at bedtime, Disp: , Rfl:     melatonin 1 mg, Take 3 mg by mouth daily at bedtime, Disp: , Rfl:     methylPREDNISolone 4 MG tablet therapy pack, Use as directed on package, Disp: 21 each, Rfl: 0    metoprolol succinate (TOPROL-XL) 25 mg 24 hr tablet, Take 25 mg by mouth daily, Disp: , Rfl:     metoprolol succinate (TOPROL-XL) 50 mg 24 hr tablet, , Disp: , Rfl:     Probiotic Product (CULTURELLE PROBIOTICS PO), Take by mouth, Disp: , Rfl:     RA LAXATIVE powder, take 17GM (DISSOLVED IN WATER) by mouth once daily if needed for constipation, Disp: , Rfl:     traZODone (DESYREL) 50 mg tablet, Take 50 mg by mouth daily at bedtime, Disp: , Rfl:     venlafaxine (EFFEXOR) 75 mg tablet, Take 75 mg by mouth daily , Disp: , Rfl:     venlafaxine (EFFEXOR-XR) 75 mg 24 hr capsule, Take 75 mg by mouth daily, Disp: , Rfl:     warfarin (COUMADIN) 2 5 mg tablet, Every Monday, Wednesday and Friday, Disp: , Rfl: 0    warfarin (COUMADIN) 5 mg tablet, Take 1 tablet (5 mg total) by mouth see administration instructions Four days in a week    Every  Tuesday, Thursday, Saturday and Sunday, Disp: 30 tablet, Rfl: 0      Active Problems     Patient Active Problem List   Diagnosis    Atrial fibrillation ([facility] [address] )    Constipation    Compression fracture of lumbar spine, non-traumatic, with routine healing, subsequent encounter    History of breast cancer    Other specified hypothyroidism    Chronic midline low back pain with right-sided sciatica    Anxiety    Primary osteoarthritis of both knees    Chronic pain syndrome    Lumbar spondylosis    History of mitral valve replacement    Pancreatic abnormality    Diverticulosis    Abdominal distention    Hepatic cyst    Renal cyst    Chronic pain of right knee    Primary osteoarthritis of right knee         Past Medical History     Past Medical History:   Diagnosis Date    Acute blood loss anemia 6/5/2020    Arm contusion, right 2/2 Coumadin toxicity 6/4/2020    Arm swelling 6/4/2020    Atrial fibrillation ([facility] [address] )     Bilateral impacted cerumen 6/18/2020    Breast cancer ([facility] [address] ) 1989    left    Diverticulitis-recent 6/5/2020    History of chemotherapy 1989    left breast    Medicare annual wellness visit, subsequent 6/18/2020    Tick bite with subsequent removal of tick 5/28/2019    Last Assessment & Plan:  On doxy so no need for lymes testing or additional antibiotic         Surgical History     Past Surgical History:   Procedure Laterality Date    APPENDECTOMY      AUGMENTATION MAMMAPLASTY Right 1989    implant    CARDIAC ELECTROPHYSIOLOGY STUDY, ESOPHAGEAL      CARDIAC VALVE REPLACEMENT      MV    COLONOSCOPY      HYSTERECTOMY  2002    MASTECTOMY Left 1989    OOPHORECTOMY Bilateral 2002    TONSILLECTOMY           Family History Family History   Problem Relation Age of Onset    Diabetes Mother     Cancer Mother     Breast cancer Mother 40    Colon cancer Father     Colon cancer Paternal Adela Horn     Colon cancer Paternal Grandfather     No Known Problems Daughter     No Known Problems Daughter     Breast cancer Maternal Aunt 39    Breast cancer Maternal Aunt         age unknown   Jullie Liming Breast cancer Maternal Aunt         age unknown   Jullie Liming Breast cancer Maternal Aunt         age unknown    No Known Problems Paternal Aunt          Social History     Social History       Radiology

## 2021-04-02 LAB — BACTERIA UR CULT: NORMAL

## 2021-04-08 DIAGNOSIS — G89.4 CHRONIC PAIN SYNDROME: ICD-10-CM

## 2021-04-12 RX ORDER — GABAPENTIN 300 MG/1
CAPSULE ORAL
Qty: 120 CAPSULE | Refills: 0 | OUTPATIENT
Start: 2021-04-12

## 2021-05-04 ENCOUNTER — TELEPHONE (OUTPATIENT)
Dept: OBGYN CLINIC | Facility: HOSPITAL | Age: 76
End: 2021-05-04

## 2021-05-04 DIAGNOSIS — M81.0 AGE-RELATED OSTEOPOROSIS WITHOUT CURRENT PATHOLOGICAL FRACTURE: ICD-10-CM

## 2021-05-04 RX ORDER — ALENDRONATE SODIUM 70 MG/1
70 TABLET ORAL
Qty: 4 TABLET | Refills: 5 | Status: SHIPPED | OUTPATIENT
Start: 2021-05-04 | End: 2021-07-01

## 2021-05-04 NOTE — TELEPHONE ENCOUNTER
Patient sees Dr Laurita Zhu  Patient is calling because she was advised she was going to be prescribed a medication at her last visit to slow down her osteoporosis and she wasn't sure if it was going to be mail or she was given a script because she doesn't have one        CB:[phone]

## 2021-05-04 NOTE — TELEPHONE ENCOUNTER
I already called this in at the last office visit  It is fosamax 70 mg once weekly and should be at her pharmacy, thanks

## 2021-05-04 NOTE — TELEPHONE ENCOUNTER
Spoke with patient  and she never picked up the fosamax, she asked that we resend it to the [facility] in [place] please

## 2021-05-13 ENCOUNTER — TELEPHONE (OUTPATIENT)
Dept: CARDIOLOGY CLINIC | Facility: CLINIC | Age: 76
End: 2021-05-13

## 2021-05-13 NOTE — TELEPHONE ENCOUNTER
Pt called & stated that she is experiencing  chest & back pain and had to lay down   Call transferred [facility]

## 2021-05-13 NOTE — TELEPHONE ENCOUNTER
S/w pt, last seen by Dr Liza Monk on 1/25/21 complaining of intermittent chest and back/shoulder pain that started last night after she ate  Pt states that she does have GERD and Gastric erosion which she sees GI for treatment and started a new diet  Advised to call GI, and go to the ER if symptoms persist  Verbally understood  Pt would like Dr Liza Monk to give her a call please    Arron Montanez- [phone]

## 2021-05-13 NOTE — TELEPHONE ENCOUNTER
Patient having atypical chest pain and back pain which is intermittent   She recently tripped and hit the back   She didn't passed out but felt like dizzy  Advise to go to ED if symptoms persists or worsens    Please arrange for holter monitor which was ordered for her during clinic visit in jan 2021

## 2021-05-14 ENCOUNTER — OFFICE VISIT (OUTPATIENT)
Dept: GASTROENTEROLOGY | Facility: CLINIC | Age: 76
End: 2021-05-14
Payer: MEDICARE

## 2021-05-14 VITALS
BODY MASS INDEX: 30.05 KG/M2 | DIASTOLIC BLOOD PRESSURE: 88 MMHG | RESPIRATION RATE: 18 BRPM | HEART RATE: 88 BPM | SYSTOLIC BLOOD PRESSURE: 120 MMHG | WEIGHT: 176 LBS | HEIGHT: 64 IN

## 2021-05-14 DIAGNOSIS — R10.13 EPIGASTRIC PAIN: Primary | ICD-10-CM

## 2021-05-14 PROCEDURE — 99214 OFFICE O/P EST MOD 30 MIN: CPT | Performed by: INTERNAL MEDICINE

## 2021-05-14 NOTE — PROGRESS NOTES
[facility]      Chief Complaint:  Abdominal pain    HPI:  Esmer Sawyer is a 77 y o   female who presents with onset of epigastric pain  According to the patient she has an abscess to period she had been placed on clindamycin on Monday  She began having pain on Wednesday and Thursday  She stop the clindamycin  The pain has gotten better  She had no vomiting  She was having difficulty chewing and has started eating small portions of different foods so has had some mild weight loss  She has no melena fever or chills  No hematochezia  No radiation of the pain  No other definitive exacerbating or remitting factors  Patient had a gastroscopy in August 2020 which showed mild pyloric erosions  She does have mild gastroparesis based on a gastric emptying study in 2021  She is not having any early satiety at the present point  She is actually doing quite well until she took the clindamycin  No significant nonsteroidal use  She has no other symptoms except knee pain  She has no chest pain or shortness of breath  No nocturnal symptomatology  No exertional symptomatology     No relationship to any food  Review of Systems:   Constitutional: No fever or chills, feels well, no tiredness, no recent weight gain or weight loss  HENT: No complaints of earache, no hearing loss, no nosebleeds, no nasal discharge, no sore throat, no hoarseness  Eyes: No complaints of eye pain, no red eyes, no discharge from eyes, no itchy eyes  Cardiovascular: No complaints of slow heart rate, no fast heart rate, no chest pain, no palpitations, no leg claudication, no lower extremity edema  Respiratory: No complaints of shortness of breath, no wheezing, no cough, no SOB on exertion, no orthopnea  Gastrointestinal: As noted in HPI  Genitourinary: No complaints of dysuria, no incontinence, no hesitancy, no nocturia     Musculoskeletal:  As per HPI  Neurological: No complaints of headache, no confusion, no convulsions, no numbness or tingling, no dizziness or fainting, no limb weakness, no difficulty walking  Skin: No complaints of skin rash or skin lesions, no itching, no skin wound, no dry skin  Hematological/Lymphatic: No complaints of swollen glands, does not bleed easy  Allergic/Immunologic: No immunocompromised state  Endocrine:  No complaints of polyuria, no polydipsia  Psychiatric/Behavioral: is not suicidal, no sleep disturbances, no anxiety or depression, no change in personality, no emotional problems         Historical Information   Past Medical History:   Diagnosis Date    Acute blood loss anemia 6/5/2020    Arm contusion, right 2/2 Coumadin toxicity 6/4/2020    Arm swelling 6/4/2020    Atrial fibrillation ([facility] [address] )     Bilateral impacted cerumen 6/18/2020    Breast cancer ([facility] [address] ) 1989    left    Diverticulitis-recent 6/5/2020    History of chemotherapy 1989    left breast    Medicare annual wellness visit, subsequent 6/18/2020    Tick bite with subsequent removal of tick 5/28/2019    Last Assessment & Plan:  On doxy so no need for lymes testing or additional antibiotic     Past Surgical History:   Procedure Laterality Date    APPENDECTOMY      AUGMENTATION MAMMAPLASTY Right 1989    implant    CARDIAC ELECTROPHYSIOLOGY STUDY, ESOPHAGEAL      CARDIAC VALVE REPLACEMENT      MV    COLONOSCOPY      HYSTERECTOMY  2002    MASTECTOMY Left 1989    OOPHORECTOMY Bilateral 2002    TONSILLECTOMY       Social History   Social History     Substance and Sexual Activity   Alcohol Use Yes    Frequency: 2-4 times a month    Comment: occasionally     Social History     Substance and Sexual Activity   Drug Use Never     Social History     Tobacco Use   Smoking Status Never Smoker   Smokeless Tobacco Never Used     Family History   Problem Relation Age of Onset    Diabetes Mother     Cancer Mother     Breast cancer Mother 40    Colon cancer Father     Colon cancer Paternal Grandmother     Colon cancer Paternal Grandfather     No Known Problems Daughter     No Known Problems Daughter     Breast cancer Maternal Aunt 39    Breast cancer Maternal Aunt         age unknown   Clifm Sebastián Breast cancer Maternal Aunt         age unknown   Clifm Sebastián Breast cancer Maternal Aunt         age unknown    No Known Problems Paternal Aunt          Current Medications: has a current medication list which includes the following prescription(s): acetaminophen, alendronate, amiodarone, calcium citrate-vitamin d, calcium polycarbophil, cholecalciferol, diclofenac sodium, diltiazem, enoxaparin, famotidine, gabapentin, levothyroxine, levothyroxine, lorazepam, melatonin, methylprednisolone, metoprolol succinate, metoprolol succinate, probiotic product, ra laxative, trazodone, venlafaxine, venlafaxine, warfarin, irbesartan, and warfarin  Vital Signs: /88   Pulse 88   Resp 18   Ht 5' 4" (1 626 m)   Wt 79 8 kg (176 lb)   BMI 30 21 kg/m²       Physical Exam:   Constitutional  General Appearance: No acute distress, well appearing and well nourished  Head  Normocephalic  Eyes  Conjunctivae and lids: No swelling, erythema, or discharge  Pupils and irises: Equal, round and reactive to light  Ears, Nose, Mouth, and Throat  External inspection of ears and nose: Normal  Nasal mucosa, septum and turbinates: Normal without edema or erythema/   Oropharynx: Normal with no erythema, edema, exudate or lesions  Neck  Normal range of motion  Neck supple  Cardiovascular  Auscultation of the heart: Normal rate and rhythm, normal S1 and S2 without murmurs  Examination of the extremities for edema and/or varicosities: Normal  Pulmonary/Chest  Respiratory effort: No increased work of breathing or signs of respiratory distress  Auscultation of lungs: Clear to auscultation, equal breath sounds bilaterally, no wheezes, rales, no rhonchi  Abdomen  Abdomen: Non-tender, no masses     Liver and spleen: No hepatomegaly or splenomegaly  Musculoskeletal  Gait and station: normal   Digits and Nails: normal without clubbing or cyanosis  Inspection/palpation of joints, bones, and muscles: Normal  Neurological  No nystagmus or asterixis  Skin  Skin and subcutaneous tissue: Normal without rashes or lesions  Lymphatic  Palpation of the lymph nodes in neck: No lymphadenopathy  Psychiatric  Orientation to person, place and time: Normal   Mood and affect: Normal          Labs:  Lab Results   Component Value Date    ALT 18 05/22/2020    AST 18 05/22/2020    BUN 12 06/12/2020    CALCIUM 8 8 06/12/2020     06/12/2020    CO2 27 06/12/2020    CREATININE 0 84 06/12/2020    HDL 75 06/25/2020    HCT 32 1 (L) 06/14/2020    HGB 10 0 (L) 06/14/2020     06/14/2020    K 3 8 06/12/2020    TRIG 119 06/25/2020    WBC 6 23 06/14/2020         X-Rays & Procedures:   No orders to display           ______________________________________________________________________      Assessment & Plan:     Diagnoses and all orders for this visit:    Epigastric pain      Patient will double her dose of Pepcid for 1 week  She also will start Ensure  She will call me with a progress report

## 2021-05-18 DIAGNOSIS — K21.9 GASTROESOPHAGEAL REFLUX DISEASE, UNSPECIFIED WHETHER ESOPHAGITIS PRESENT: ICD-10-CM

## 2021-05-18 RX ORDER — FAMOTIDINE 20 MG/1
TABLET, FILM COATED ORAL
Qty: 60 TABLET | Refills: 1 | Status: SHIPPED | OUTPATIENT
Start: 2021-05-18 | End: 2021-05-24

## 2021-05-23 DIAGNOSIS — K21.9 GASTROESOPHAGEAL REFLUX DISEASE, UNSPECIFIED WHETHER ESOPHAGITIS PRESENT: ICD-10-CM

## 2021-05-24 RX ORDER — FAMOTIDINE 20 MG/1
TABLET, FILM COATED ORAL
Qty: 60 TABLET | Refills: 1 | Status: SHIPPED | OUTPATIENT
Start: 2021-05-24

## 2021-05-26 ENCOUNTER — TELEPHONE (OUTPATIENT)
Dept: PAIN MEDICINE | Facility: CLINIC | Age: 76
End: 2021-05-26

## 2021-05-26 ENCOUNTER — TELEPHONE (OUTPATIENT)
Dept: GASTROENTEROLOGY | Facility: CLINIC | Age: 76
End: 2021-05-26

## 2021-05-26 DIAGNOSIS — R10.13 EPIGASTRIC PAIN: Primary | ICD-10-CM

## 2021-05-26 RX ORDER — PANTOPRAZOLE SODIUM 40 MG/1
40 TABLET, DELAYED RELEASE ORAL 2 TIMES DAILY
Qty: 60 TABLET | Refills: 2 | Status: SHIPPED | OUTPATIENT
Start: 2021-05-26 | End: 2021-08-25

## 2021-05-26 NOTE — TELEPHONE ENCOUNTER
Please tell her we will set her up for repeat egd, we will replace pepcid with protonix 40mg po bid half hour ac breakfast and dinner for 2 weeks (and call that in) and please get limited abdominal ultrasound

## 2021-05-26 NOTE — TELEPHONE ENCOUNTER
ptn states her condition has gotten worse and she wants to know what to do about it   Please advise Dr Troy [place]

## 2021-05-28 ENCOUNTER — TELEPHONE (OUTPATIENT)
Dept: CARDIOLOGY CLINIC | Facility: CLINIC | Age: 76
End: 2021-05-28

## 2021-05-28 ENCOUNTER — HOSPITAL ENCOUNTER (OUTPATIENT)
Dept: NON INVASIVE DIAGNOSTICS | Facility: CLINIC | Age: 76
Discharge: HOME | End: 2021-05-28
Payer: MEDICARE

## 2021-05-28 DIAGNOSIS — I48.0 PAROXYSMAL ATRIAL FIBRILLATION (HCC): ICD-10-CM

## 2021-05-28 DIAGNOSIS — R42 DIZZINESS: ICD-10-CM

## 2021-05-28 PROCEDURE — 93226 XTRNL ECG REC<48 HR SCAN A/R: CPT

## 2021-05-28 PROCEDURE — 93225 XTRNL ECG REC<48 HRS REC: CPT

## 2021-05-28 NOTE — TELEPHONE ENCOUNTER
Pt stopped in & stated that she saw her endocrinologist at [address] her TSH level was 10  He would like to increase her dose of Levothyroxine from 75 to 88 as long as her afib rate is stable   Please call Dr Cornelia Mc office at [phone]

## 2021-06-01 NOTE — TELEPHONE ENCOUNTER
This Patient had Holter monitoring done but results are pending     Can we try to obtain result of Holter monitoring

## 2021-06-02 PROCEDURE — 93227 XTRNL ECG REC<48 HR R&I: CPT | Performed by: INTERNAL MEDICINE

## 2021-06-02 NOTE — TELEPHONE ENCOUNTER
Results are in the reading room to be read? Please advise once done  So we can forward to Dr Sumaya Diaz, Thanks

## 2021-06-03 NOTE — TELEPHONE ENCOUNTER
daly us scheduled for a procedure on 6/10/21 and is currently having an A-Fib which she states happens every so often   Please advise

## 2021-06-04 ENCOUNTER — TELEPHONE (OUTPATIENT)
Dept: GASTROENTEROLOGY | Facility: HOSPITAL | Age: 76
End: 2021-06-04

## 2021-06-08 ENCOUNTER — TELEPHONE (OUTPATIENT)
Dept: GASTROENTEROLOGY | Facility: HOSPITAL | Age: 76
End: 2021-06-08

## 2021-06-09 ENCOUNTER — TELEPHONE (OUTPATIENT)
Dept: GASTROENTEROLOGY | Facility: HOSPITAL | Age: 76
End: 2021-06-09

## 2021-06-09 NOTE — PERIOPERATIVE NURSING NOTE
Pt called and stated she had not stopped her coumadin  She also stated she received a call from Cardiology telling her to hold her coumadin for one week due to levels  Spoke with Dr Syd Johnston and he would like pt to reschedule for next week

## 2021-06-16 ENCOUNTER — TELEPHONE (OUTPATIENT)
Dept: GASTROENTEROLOGY | Facility: HOSPITAL | Age: 76
End: 2021-06-16

## 2021-06-17 ENCOUNTER — HOSPITAL ENCOUNTER (OUTPATIENT)
Dept: GASTROENTEROLOGY | Facility: HOSPITAL | Age: 76
Setting detail: OUTPATIENT SURGERY
Discharge: HOME | End: 2021-06-17
Attending: INTERNAL MEDICINE | Admitting: INTERNAL MEDICINE
Payer: MEDICARE

## 2021-06-17 ENCOUNTER — ANESTHESIA EVENT (OUTPATIENT)
Dept: GASTROENTEROLOGY | Facility: HOSPITAL | Age: 76
End: 2021-06-17

## 2021-06-17 ENCOUNTER — ANESTHESIA (OUTPATIENT)
Dept: GASTROENTEROLOGY | Facility: HOSPITAL | Age: 76
End: 2021-06-17

## 2021-06-17 ENCOUNTER — TELEPHONE (OUTPATIENT)
Dept: GASTROENTEROLOGY | Facility: HOSPITAL | Age: 76
End: 2021-06-17

## 2021-06-17 VITALS
BODY MASS INDEX: 27.47 KG/M2 | HEART RATE: 94 BPM | SYSTOLIC BLOOD PRESSURE: 122 MMHG | TEMPERATURE: 98.2 F | DIASTOLIC BLOOD PRESSURE: 77 MMHG | OXYGEN SATURATION: 98 % | RESPIRATION RATE: 16 BRPM | WEIGHT: 164.9 LBS | HEIGHT: 65 IN

## 2021-06-17 DIAGNOSIS — R68.81 EARLY SATIETY: ICD-10-CM

## 2021-06-17 DIAGNOSIS — R10.13 EPIGASTRIC PAIN: ICD-10-CM

## 2021-06-17 PROCEDURE — 88305 TISSUE EXAM BY PATHOLOGIST: CPT | Performed by: PATHOLOGY

## 2021-06-17 PROCEDURE — 88342 IMHCHEM/IMCYTCHM 1ST ANTB: CPT | Performed by: PATHOLOGY

## 2021-06-17 PROCEDURE — 43239 EGD BIOPSY SINGLE/MULTIPLE: CPT | Performed by: INTERNAL MEDICINE

## 2021-06-17 RX ORDER — PROPOFOL 10 MG/ML
INJECTION, EMULSION INTRAVENOUS AS NEEDED
Status: DISCONTINUED | OUTPATIENT
Start: 2021-06-17 | End: 2021-06-17

## 2021-06-17 RX ORDER — SODIUM CHLORIDE, SODIUM LACTATE, POTASSIUM CHLORIDE, CALCIUM CHLORIDE 600; 310; 30; 20 MG/100ML; MG/100ML; MG/100ML; MG/100ML
INJECTION, SOLUTION INTRAVENOUS CONTINUOUS PRN
Status: DISCONTINUED | OUTPATIENT
Start: 2021-06-17 | End: 2021-06-17

## 2021-06-17 RX ORDER — LIDOCAINE HYDROCHLORIDE 10 MG/ML
INJECTION, SOLUTION EPIDURAL; INFILTRATION; INTRACAUDAL; PERINEURAL AS NEEDED
Status: DISCONTINUED | OUTPATIENT
Start: 2021-06-17 | End: 2021-06-17

## 2021-06-17 RX ADMIN — PROPOFOL 100 MG: 10 INJECTION, EMULSION INTRAVENOUS at 10:40

## 2021-06-17 RX ADMIN — LIDOCAINE HYDROCHLORIDE 80 MG: 10 INJECTION, SOLUTION EPIDURAL; INFILTRATION; INTRACAUDAL; PERINEURAL at 10:40

## 2021-06-17 RX ADMIN — SODIUM CHLORIDE, SODIUM LACTATE, POTASSIUM CHLORIDE, AND CALCIUM CHLORIDE: .6; .31; .03; .02 INJECTION, SOLUTION INTRAVENOUS at 10:35

## 2021-06-17 RX ADMIN — PROPOFOL 50 MG: 10 INJECTION, EMULSION INTRAVENOUS at 10:41

## 2021-06-17 NOTE — H&P
History and Physical -  [facility]  Sonal Wilder 77 y o  female MRN: [id number]                  HPI: Sonal Wilder is a 77y o  year old female who presents for epigastric pain of undetermined etiology      REVIEW OF SYSTEMS: Per the HPI, and otherwise unremarkable      Historical Information   Past Medical History:   Diagnosis Date    Acute blood loss anemia 6/5/2020    Arm contusion, right 2/2 Coumadin toxicity 6/4/2020    Arm swelling 6/4/2020    Atrial fibrillation (HCC)     Bilateral impacted cerumen 6/18/2020    Breast cancer ([address] ) 1989    left    Diverticulitis-recent 6/5/2020    History of chemotherapy 1989    left breast    Medicare annual wellness visit, subsequent 6/18/2020    Tick bite with subsequent removal of tick 5/28/2019    Last Assessment & Plan:  On doxy so no need for lymes testing or additional antibiotic     Past Surgical History:   Procedure Laterality Date    APPENDECTOMY      AUGMENTATION MAMMAPLASTY Right 1989    implant    CARDIAC ELECTROPHYSIOLOGY STUDY, ESOPHAGEAL      CARDIAC VALVE REPLACEMENT      MV    COLONOSCOPY      HYSTERECTOMY  2002    MASTECTOMY Left 1989    OOPHORECTOMY Bilateral 2002    TONSILLECTOMY       Social History   Social History     Substance and Sexual Activity   Alcohol Use Yes    Comment: occasionally     Social History     Substance and Sexual Activity   Drug Use Never     Social History     Tobacco Use   Smoking Status Never Smoker   Smokeless Tobacco Never Used     Family History   Problem Relation Age of Onset    Diabetes Mother     Cancer Mother     Breast cancer Mother 40    Colon cancer Father     Colon cancer Paternal Mallika Sames     Colon cancer Paternal Grandfather     No Known Problems Daughter     No Known Problems Daughter     Breast cancer Maternal Aunt 39    Breast cancer Maternal Aunt         age unknown   Chaka Austin Breast cancer Maternal Aunt         age unknown    Breast cancer Maternal Aunt         age unknown    No Known Problems Paternal Aunt        Meds/Allergies     (Not in a hospital admission)      Allergies   Allergen Reactions    Penicillins Anaphylaxis     Other reaction(s): Anaphylaxis      Iodides      Other reaction(s): Rash         Objective     Blood pressure 145/81, pulse 103, temperature (!) 97 1 °F (36 2 °C), temperature source Temporal, resp  rate 20, height 5' 5" (1 651 m), weight 74 8 kg (164 lb 14 5 oz), SpO2 96 %, not currently breastfeeding        PHYSICAL EXAM    Gen: NAD  CV: RRR  CHEST: Clear  ABD: soft, NT/ND  EXT: no edema  Neuro: AAO      ASSESSMENT/PLAN:  This is a 77y o  year old female here for epigastric pain    PLAN:   Procedure:  EGD

## 2021-06-17 NOTE — ANESTHESIA PREPROCEDURE EVALUATION
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-sided sciatica   (+) Lumbar spondylosis   (+) Primary osteoarthritis of both knees   (+) Primary osteoarthritis of right knee      NEURO/PSYCH   (+) Anxiety   (+) Chronic pain syndrome   (+) History of breast cancer     Stopped warfarin 1 week ago  Currently in Afib        Physical Exam    Airway    Mallampati score: II         Dental   No notable dental hx     Cardiovascular  Rhythm: regular, Rate: normal,     Pulmonary  Pulmonary exam normal     Other Findings        Anesthesia Plan  ASA Score- 2     Anesthesia Type- IV sedation with anesthesia with ASA Monitors  Additional Monitors:   Airway Plan:           Plan Factors-Exercise tolerance (METS): >4 METS  Chart reviewed  Patient summary reviewed  Patient is not a current smoker  Patient not instructed to abstain from smoking on day of procedure  Patient did not smoke on day of surgery  Induction- intravenous  Postoperative Plan-     Informed Consent- Anesthetic plan and risks discussed with patient  I personally reviewed this patient with the CRNA  Discussed and agreed on the Anesthesia Plan with the CRNA  Marycruz Lebron

## 2021-06-17 NOTE — ANESTHESIA POSTPROCEDURE EVALUATION
Post-Op Assessment Note    CV Status:  Stable  Pain Score: 0    Pain management: adequate     Mental Status:  Sleepy   Hydration Status:  Stable   PONV Controlled:  None   Airway Patency:  Patent and adequate      Post Op Vitals Reviewed: Yes      Staff: CRNA         No complications documented      /71 (06/17/21 1051)    Temp (!) 96 1 °F (35 6 °C) (06/17/21 1051)    Pulse 76 (06/17/21 1051)   Resp 14 (06/17/21 1051)    SpO2 92 % (06/17/21 1051)

## 2021-06-25 ENCOUNTER — TELEPHONE (OUTPATIENT)
Dept: GASTROENTEROLOGY | Facility: CLINIC | Age: 76
End: 2021-06-25

## 2021-06-28 NOTE — PROGRESS NOTES
Problem List Items Addressed This Visit        Genitourinary    Atrophic vaginitis - Primary    Relevant Medications    conjugated estrogens (PREMARIN) vaginal cream    OAB (overactive bladder)    Relevant Medications    conjugated estrogens (PREMARIN) vaginal cream    Microscopic hematuria       Other    Constipation            Discussion:    Joaquin Chan did well with her cystoscopy today, this is negative for any malignant findings  She does have retraction of the meatus into the vaginal canal, she also has a history of a previous negative cystoscopic / urologic workup for microscopic hematuria  New diagnosis today includes atrophic vaginitis, I do believe that she would benefit from topical estrogen therapy, we talked about the risks and benefits of this medication and this was sent to her pharmacy  Her symptoms of urgency and frequency of urination are likely to also improve with treatment of her atrophic vaginitis  I also reviewed with her avoidance of bladder irritants and excessive intake of spicy food, alcohol, and caffeine  When she is seen at her follow-up visit if she has not had satisfactory improvement in her symptoms consideration can be given to something like Myrbetriq given that she does have a history of constipation  With regard to her history of constipation I am able to see the outline of the rectum cystoscopically indicating a significant stool burden within the rectum, I counseled her on continued fiber supplementation and the incorporation of the stool softener and as her fecal elimination improves I suspect that her urinary symptoms will also improve  She is scheduled for a renal ultrasound to complete her microscopic hematuria workup as she is a low risk patient  All questions and concerns answered and addressed        Assessment and plan:       I agree with the assessment and plan for this patient as listed in the body of this note by the advanced practitioner without any additions to the plan of care      Nette Pack MD      Chief Complaint     Chief Complaint   Patient presents with    Cystoscopy         History of Present Illness     Kings Mcfarland is a 77 y o  Woman with a history of microscopic hematuria, also with complaints of urgency and frequency, using 1 light pad daily, this is not particularly wet when she changes it, this does cause her bother  Scheduled for ultrasound, has not yet been performed  A history of diverticulitis, also some abdominal discomfort, this is being worked up by her other healthcare team members  The following portions of the patient's history were reviewed and updated as appropriate: allergies, current medications, past family history, past medical history, past social history, past surgical history and problem list     Detailed Urologic History     - please refer to HPI    Review of Systems     Review of Systems   Constitutional: Negative  HENT: Negative  Eyes: Negative  Respiratory: Negative  Cardiovascular: Negative  Gastrointestinal: Negative  Endocrine: Negative  Genitourinary: Positive for frequency and urgency  Musculoskeletal: Negative  Skin: Negative  Allergic/Immunologic: Negative  Neurological: Negative  Hematological: Negative  Psychiatric/Behavioral: Negative  Allergies     Allergies   Allergen Reactions    Penicillins Anaphylaxis     Other reaction(s): Anaphylaxis      Iodides      Other reaction(s): Rash         Physical Exam     Physical Exam  Vitals reviewed  Constitutional:       General: She is not in acute distress  Appearance: Normal appearance  She is not ill-appearing, toxic-appearing or diaphoretic  HENT:      Head: Normocephalic and atraumatic  Eyes:      General: No scleral icterus  Right eye: No discharge  Left eye: No discharge  Cardiovascular:      Pulses: Normal pulses     Pulmonary:      Effort: Pulmonary effort is normal    Abdominal:      General: There is no distension  Palpations: There is no mass  Tenderness: There is no abdominal tenderness  Hernia: No hernia is present  Genitourinary:     Comments: Chaperone present for examination  Patient with significant vaginal mucosal atrophy  Indicative of atrophic vaginitis, no vaginal lesions, the urethral meatus has been retracted into the vaginal introitus somewhat due to the above  Musculoskeletal:         General: No swelling or tenderness  Skin:     General: Skin is warm  Neurological:      General: No focal deficit present  Mental Status: She is alert and oriented to person, place, and time  Cranial Nerves: No cranial nerve deficit  Psychiatric:         Mood and Affect: Mood normal          Behavior: Behavior normal          Thought Content:  Thought content normal          Judgment: Judgment normal              Vital Signs  Vitals:    07/01/21 1324   BP: 106/72   Pulse: 85   Weight: 74 7 kg (164 lb 9 6 oz)   Height: 5' 5" (1 651 m)         Current Medications       Current Outpatient Medications:     acetaminophen (TYLENOL) 325 mg tablet, Take 2 tablets (650 mg total) by mouth every 6 (six) hours as needed for mild pain, headaches or fever, Disp: 30 tablet, Rfl: 0    amiodarone 200 mg tablet, Take 1 tablet (200 mg total) by mouth daily (Patient taking differently: Take 400 mg by mouth 2 (two) times a day ), Disp: 90 tablet, Rfl: 3    calcium polycarbophil (FIBERCON) 625 mg tablet, Take 625 mg by mouth daily, Disp: , Rfl:     diclofenac sodium (VOLTAREN) 1 %, Apply 2 g topically 4 (four) times a day, Disp: 1 Tube, Rfl: 2    enoxaparin (LOVENOX) 80 mg/0 8 mL, Prn procedure, Disp: , Rfl:     famotidine (PEPCID) 20 mg tablet, take 1 tablet by mouth twice a day, Disp: 60 tablet, Rfl: 1    irbesartan (AVAPRO) 150 mg tablet, Take 1 tablet (150 mg total) by mouth daily at bedtime, Disp: 30 tablet, Rfl: 3    levothyroxine 75 mcg tablet, TK 1 T PO QAM OES FOR 14 DAYS, Disp: , Rfl:     LORazepam (ATIVAN) 1 mg tablet, Take 1 mg by mouth daily at bedtime, Disp: , Rfl:     melatonin 1 mg, Take 3 mg by mouth daily at bedtime, Disp: , Rfl:     metoprolol succinate (TOPROL-XL) 50 mg 24 hr tablet, 50 mg 2 (two) times a day , Disp: , Rfl:     pantoprazole (PROTONIX) 40 mg tablet, Take 1 tablet (40 mg total) by mouth 2 (two) times a day, Disp: 60 tablet, Rfl: 2    Probiotic Product (CULTURELLE PROBIOTICS PO), Take by mouth, Disp: , Rfl:     RA LAXATIVE powder, take 17GM (DISSOLVED IN WATER) by mouth once daily if needed for constipation, Disp: , Rfl:     traZODone (DESYREL) 50 mg tablet, Take 50 mg by mouth daily at bedtime, Disp: , Rfl:     venlafaxine (EFFEXOR) 75 mg tablet, Take 75 mg by mouth daily , Disp: , Rfl:     warfarin (COUMADIN) 2 5 mg tablet, Every Monday, Wednesday and Friday, Disp: , Rfl: 0    warfarin (COUMADIN) 5 mg tablet, Take 1 tablet (5 mg total) by mouth see administration instructions Four days in a week    Every  Tuesday, Thursday, Saturday and Sunday, Disp: 30 tablet, Rfl: 0    conjugated estrogens (PREMARIN) vaginal cream, Apply a pea-sized amount to the vagina 3 times a week, Disp: 42 5 g, Rfl: 3    gabapentin (NEURONTIN) 300 mg capsule, Take 2 capsules (600 mg total) by mouth 2 (two) times a day, Disp: 120 capsule, Rfl: 0    levothyroxine 25 mcg tablet, Take 25 mcg by mouth daily, Disp: , Rfl:       Active Problems     Patient Active Problem List   Diagnosis    Atrial fibrillation ([facility] [address] )    Constipation    Compression fracture of lumbar spine, non-traumatic, with routine healing, subsequent encounter    History of breast cancer    Other specified hypothyroidism    Chronic midline low back pain with right-sided sciatica    Anxiety    Primary osteoarthritis of both knees    Chronic pain syndrome    Lumbar spondylosis    History of mitral valve replacement    Pancreatic abnormality    Diverticulosis    Abdominal distention    Hepatic cyst    Renal cyst    Chronic pain of right knee    Primary osteoarthritis of right knee    Atrophic vaginitis    OAB (overactive bladder)    Microscopic hematuria         Past Medical History     Past Medical History:   Diagnosis Date    Acute blood loss anemia 6/5/2020    Arm contusion, right 2/2 Coumadin toxicity 6/4/2020    Arm swelling 6/4/2020    Atrial fibrillation ([facility] [address] )     Bilateral impacted cerumen 6/18/2020    Breast cancer ([facility] [address] ) 1989    left    Diverticulitis-recent 6/5/2020    History of chemotherapy 1989    left breast    Irregular heart beat     Medicare annual wellness visit, subsequent 6/18/2020    Tick bite with subsequent removal of tick 5/28/2019    Last Assessment & Plan:  On doxy so no need for lymes testing or additional antibiotic         Surgical History     Past Surgical History:   Procedure Laterality Date    APPENDECTOMY      AUGMENTATION MAMMAPLASTY Right 1989    implant    CARDIAC ELECTROPHYSIOLOGY STUDY, ESOPHAGEAL      CARDIAC VALVE REPLACEMENT      MV    COLONOSCOPY      HYSTERECTOMY  2002    MASTECTOMY Left 1989    OOPHORECTOMY Bilateral 2002    TONSILLECTOMY           Family History     Family History   Problem Relation Age of Onset    Diabetes Mother     Cancer Mother     Breast cancer Mother 40    Colon cancer Father     Colon cancer Paternal Grandmother     Colon cancer Paternal Grandfather     No Known Problems Daughter     No Known Problems Daughter     Breast cancer Maternal Aunt 39    Breast cancer Maternal Aunt         age unknown    Breast cancer Maternal Aunt         age unknown    Breast cancer Maternal Aunt         age unknown    No Known Problems Paternal Aunt          Social History     Social History     Social History     Tobacco Use   Smoking Status Never Smoker   Smokeless Tobacco Never Used         Pertinent Lab Values     Lab Results   Component Value Date    CREATININE 0 84 06/12/2020 Pertinent Imaging      no new imaging for my review

## 2021-06-28 NOTE — PATIENT INSTRUCTIONS

## 2021-07-01 ENCOUNTER — PROCEDURE VISIT (OUTPATIENT)
Dept: UROLOGY | Facility: CLINIC | Age: 76
End: 2021-07-01
Payer: MEDICARE

## 2021-07-01 VITALS
HEART RATE: 85 BPM | WEIGHT: 164.6 LBS | HEIGHT: 65 IN | BODY MASS INDEX: 27.42 KG/M2 | SYSTOLIC BLOOD PRESSURE: 106 MMHG | DIASTOLIC BLOOD PRESSURE: 72 MMHG

## 2021-07-01 DIAGNOSIS — Z71.2 PERSON CONSULTING FOR EXPLANATION OF EXAMINATION OR TEST FINDING: ICD-10-CM

## 2021-07-01 DIAGNOSIS — R31.29 MICROSCOPIC HEMATURIA: ICD-10-CM

## 2021-07-01 DIAGNOSIS — N32.81 OAB (OVERACTIVE BLADDER): ICD-10-CM

## 2021-07-01 DIAGNOSIS — K59.00 CONSTIPATION, UNSPECIFIED CONSTIPATION TYPE: ICD-10-CM

## 2021-07-01 DIAGNOSIS — N95.2 ATROPHIC VAGINITIS: Primary | ICD-10-CM

## 2021-07-01 PROCEDURE — 99213 OFFICE O/P EST LOW 20 MIN: CPT | Performed by: UROLOGY

## 2021-07-01 PROCEDURE — 52000 CYSTOURETHROSCOPY: CPT | Performed by: UROLOGY

## 2021-07-01 RX ORDER — IRBESARTAN 75 MG/1
75 TABLET ORAL DAILY
COMMUNITY
Start: 2021-06-04 | End: 2021-07-01

## 2021-07-01 NOTE — PROGRESS NOTES
Office Cystoscopy Procedure Note    Indication:    Hematuria    Informed consent   The risks, benefits, complications, treatment options, and expected outcomes were discussed with the patient  The patient concurred with the proposed plan and provided informed consent  Anesthesia  Lidocaine jelly 2%    Antibiotic prophylaxis   None    Procedure  In the presence of a female nurse, the patient was placed in the supine frog-legged position, was prepped and draped in the usual manner using sterile technique, and 2% lidocaine jelly instilled into the urethra  Prior to this pelvic examination showed atrophic labia majora and minora, a small vaginal introitus, poor quality vaginal mucosa, and showed no pelvic floor descensus and no urethral hypermobility  Upon stress maneuvers there was no stress incontinence  A 17 F flexible cystoscope was then inserted into the urethra and the urethra and bladder carefully examined  The following findings were noted:    Findings:  Urethra:  Normal , apart from retraction into the vaginal canal due to vaginal mucosal atrophy  Bladder:  Normal  Ureteral orifices:  Normal  Other findings:  None, retroflexed view confirms     Specimens: None                 Complications:    None; patient tolerated the procedure well           Disposition: To home            Condition: Stable    Plan:  cystoscopic workup is significant only for being able to note the outline of the rectum posteriorly  Indicated constipation, there are no malignant findings  Significant vaginal mucosal atrophy is present  Cystoscopy     Date/Time 7/1/2021 1:56 PM     Performed by  Lindsey Levine MD     Authorized by Lindsey Levine MD      Universal Protocol:  Consent: Verbal consent obtained  Written consent obtained    Risks and benefits: risks, benefits and alternatives were discussed  Consent given by: patient  Patient understanding: patient states understanding of the procedure being performed  Patient consent: the patient's understanding of the procedure matches consent given  Procedure consent: procedure consent matches procedure scheduled  Relevant documents: relevant documents present and verified  Test results: test results available and properly labeled  Site marked: the operative site was not marked  Radiology Images displayed and confirmed  If images not available, report reviewed: imaging studies available  Required items: required blood products, implants, devices, and special equipment available  Patient identity confirmed: verbally with patient and provided demographic data        Procedure Details:  Procedure type: cystoscopy    Patient tolerance: Patient tolerated the procedure well with no immediate complications    Additional Procedure Details: Office Cystoscopy Procedure Note    Indication:    Hematuria    Informed consent   The risks, benefits, complications, treatment options, and expected outcomes were discussed with the patient  The patient concurred with the proposed plan and provided informed consent  Anesthesia  Lidocaine jelly 2%    Antibiotic prophylaxis   None    Procedure  In the presence of a female nurse, the patient was placed in the supine frog-legged position, was prepped and draped in the usual manner using sterile technique, and 2% lidocaine jelly instilled into the urethra  Prior to this pelvic examination showed atrophic labia majora and minora, a small vaginal introitus, poor quality vaginal mucosa, and showed no pelvic floor descensus and no urethral hypermobility  Upon stress maneuvers there was no stress incontinence  A 17 F flexible cystoscope was then inserted into the urethra and the urethra and bladder carefully examined    The following findings were noted:    Findings:  Urethra:  Normal , apart from retraction into the vaginal canal due to vaginal mucosal atrophy  Bladder:  Normal  Ureteral orifices:  Normal  Other findings:  None, retroflexed view confirms Specimens: None                 Complications:    None; patient tolerated the procedure well           Disposition: To home            Condition: Stable    Plan:  cystoscopic workup is significant only for being able to note the outline of the rectum posteriorly  Indicated constipation, there are no malignant findings  Significant vaginal mucosal atrophy is present

## 2021-07-01 NOTE — LETTER
July 1, 2021     Monae Hutson DO  [address]  [address] [facility]  [address]    Patient: Daisy Green   YOB: 1945   Date of Visit: 7/1/2021       Dear Dr Javier Wallace: Thank you for referring Aleisha Santos to me for evaluation  Below are my notes for this consultation  If you have questions, please do not hesitate to call me  I look forward to following your patient along with you  Sincerely,        Montserrat Larson MD        CC: No Recipients  Montserrat Larson MD  7/1/2021  1:57 PM  Sign when Signing Visit  Office Cystoscopy Procedure Note    Indication:    Hematuria    Informed consent   The risks, benefits, complications, treatment options, and expected outcomes were discussed with the patient  The patient concurred with the proposed plan and provided informed consent  Anesthesia  Lidocaine jelly 2%    Antibiotic prophylaxis   None    Procedure  In the presence of a female nurse, the patient was placed in the supine frog-legged position, was prepped and draped in the usual manner using sterile technique, and 2% lidocaine jelly instilled into the urethra  Prior to this pelvic examination showed atrophic labia majora and minora, a small vaginal introitus, poor quality vaginal mucosa, and showed no pelvic floor descensus and no urethral hypermobility  Upon stress maneuvers there was no stress incontinence  A 17 F flexible cystoscope was then inserted into the urethra and the urethra and bladder carefully examined    The following findings were noted:    Findings:  Urethra:  Normal , apart from retraction into the vaginal canal due to vaginal mucosal atrophy  Bladder:  Normal  Ureteral orifices:  Normal  Other findings:  None, retroflexed view confirms     Specimens: None                 Complications:    None; patient tolerated the procedure well           Disposition: To home            Condition: Stable    Plan:  cystoscopic workup is significant only for being able to note the outline of the rectum posteriorly  Indicated constipation, there are no malignant findings  Significant vaginal mucosal atrophy is present  Cystoscopy     Date/Time 7/1/2021 1:56 PM     Performed by  Norma Ch MD     Authorized by Norma Ch MD      Universal Protocol:  Consent: Verbal consent obtained  Written consent obtained  Risks and benefits: risks, benefits and alternatives were discussed  Consent given by: patient  Patient understanding: patient states understanding of the procedure being performed  Patient consent: the patient's understanding of the procedure matches consent given  Procedure consent: procedure consent matches procedure scheduled  Relevant documents: relevant documents present and verified  Test results: test results available and properly labeled  Site marked: the operative site was not marked  Radiology Images displayed and confirmed  If images not available, report reviewed: imaging studies available  Required items: required blood products, implants, devices, and special equipment available  Patient identity confirmed: verbally with patient and provided demographic data        Procedure Details:  Procedure type: cystoscopy    Patient tolerance: Patient tolerated the procedure well with no immediate complications    Additional Procedure Details: Office Cystoscopy Procedure Note    Indication:    Hematuria    Informed consent   The risks, benefits, complications, treatment options, and expected outcomes were discussed with the patient  The patient concurred with the proposed plan and provided informed consent  Anesthesia  Lidocaine jelly 2%    Antibiotic prophylaxis   None    Procedure  In the presence of a female nurse, the patient was placed in the supine frog-legged position, was prepped and draped in the usual manner using sterile technique, and 2% lidocaine jelly instilled into the urethra   Prior to this pelvic examination showed atrophic labia majora and minora, a small vaginal introitus, poor quality vaginal mucosa, and showed no pelvic floor descensus and no urethral hypermobility  Upon stress maneuvers there was no stress incontinence  A 17 F flexible cystoscope was then inserted into the urethra and the urethra and bladder carefully examined  The following findings were noted:    Findings:  Urethra:  Normal , apart from retraction into the vaginal canal due to vaginal mucosal atrophy  Bladder:  Normal  Ureteral orifices:  Normal  Other findings:  None, retroflexed view confirms     Specimens: None                 Complications:    None; patient tolerated the procedure well           Disposition: To home            Condition: Stable    Plan:  cystoscopic workup is significant only for being able to note the outline of the rectum posteriorly  Indicated constipation, there are no malignant findings  Significant vaginal mucosal atrophy is present  Charissa Urban MD  7/1/2021  1:59 PM  Sign when Signing Visit       Problem List Items Addressed This Visit        Genitourinary    Atrophic vaginitis - Primary    Relevant Medications    conjugated estrogens (PREMARIN) vaginal cream    OAB (overactive bladder)    Relevant Medications    conjugated estrogens (PREMARIN) vaginal cream    Microscopic hematuria       Other    Constipation            Discussion:    Teodora Rodriguez did well with her cystoscopy today, this is negative for any malignant findings  She does have retraction of the meatus into the vaginal canal, she also has a history of a previous negative cystoscopic / urologic workup for microscopic hematuria  New diagnosis today includes atrophic vaginitis, I do believe that she would benefit from topical estrogen therapy, we talked about the risks and benefits of this medication and this was sent to her pharmacy  Her symptoms of urgency and frequency of urination are likely to also improve with treatment of her atrophic vaginitis    I also reviewed with her avoidance of bladder irritants and excessive intake of spicy food, alcohol, and caffeine  When she is seen at her follow-up visit if she has not had satisfactory improvement in her symptoms consideration can be given to something like Myrbetriq given that she does have a history of constipation  With regard to her history of constipation I am able to see the outline of the rectum cystoscopically indicating a significant stool burden within the rectum, I counseled her on continued fiber supplementation and the incorporation of the stool softener and as her fecal elimination improves I suspect that her urinary symptoms will also improve  She is scheduled for a renal ultrasound to complete her microscopic hematuria workup as she is a low risk patient  All questions and concerns answered and addressed  Assessment and plan:       I agree with the assessment and plan for this patient as listed in the body of this note by the advanced practitioner without any additions to the plan of care      Alicia Padilla MD      Chief Complaint     Chief Complaint   Patient presents with    Cystoscopy         History of Present Illness     Rodolfo Alexander is a 77 y o  Woman with a history of microscopic hematuria, also with complaints of urgency and frequency, using 1 light pad daily, this is not particularly wet when she changes it, this does cause her bother  Scheduled for ultrasound, has not yet been performed  A history of diverticulitis, also some abdominal discomfort, this is being worked up by her other healthcare team members  The following portions of the patient's history were reviewed and updated as appropriate: allergies, current medications, past family history, past medical history, past social history, past surgical history and problem list     Detailed Urologic History     - please refer to HPI    Review of Systems     Review of Systems   Constitutional: Negative      HENT: Negative  Eyes: Negative  Respiratory: Negative  Cardiovascular: Negative  Gastrointestinal: Negative  Endocrine: Negative  Genitourinary: Positive for frequency and urgency  Musculoskeletal: Negative  Skin: Negative  Allergic/Immunologic: Negative  Neurological: Negative  Hematological: Negative  Psychiatric/Behavioral: Negative  Allergies     Allergies   Allergen Reactions    Penicillins Anaphylaxis     Other reaction(s): Anaphylaxis      Iodides      Other reaction(s): Rash         Physical Exam     Physical Exam  Vitals reviewed  Constitutional:       General: She is not in acute distress  Appearance: Normal appearance  She is not ill-appearing, toxic-appearing or diaphoretic  HENT:      Head: Normocephalic and atraumatic  Eyes:      General: No scleral icterus  Right eye: No discharge  Left eye: No discharge  Cardiovascular:      Pulses: Normal pulses  Pulmonary:      Effort: Pulmonary effort is normal    Abdominal:      General: There is no distension  Palpations: There is no mass  Tenderness: There is no abdominal tenderness  Hernia: No hernia is present  Genitourinary:     Comments: Chaperone present for examination  Patient with significant vaginal mucosal atrophy  Indicative of atrophic vaginitis, no vaginal lesions, the urethral meatus has been retracted into the vaginal introitus somewhat due to the above  Musculoskeletal:         General: No swelling or tenderness  Skin:     General: Skin is warm  Neurological:      General: No focal deficit present  Mental Status: She is alert and oriented to person, place, and time  Cranial Nerves: No cranial nerve deficit  Psychiatric:         Mood and Affect: Mood normal          Behavior: Behavior normal          Thought Content:  Thought content normal          Judgment: Judgment normal              Vital Signs  Vitals:    07/01/21 1324   BP: 106/72 Pulse: 85   Weight: 74 7 kg (164 lb 9 6 oz)   Height: 5' 5" (1 651 m)         Current Medications       Current Outpatient Medications:     acetaminophen (TYLENOL) 325 mg tablet, Take 2 tablets (650 mg total) by mouth every 6 (six) hours as needed for mild pain, headaches or fever, Disp: 30 tablet, Rfl: 0    amiodarone 200 mg tablet, Take 1 tablet (200 mg total) by mouth daily (Patient taking differently: Take 400 mg by mouth 2 (two) times a day ), Disp: 90 tablet, Rfl: 3    calcium polycarbophil (FIBERCON) 625 mg tablet, Take 625 mg by mouth daily, Disp: , Rfl:     diclofenac sodium (VOLTAREN) 1 %, Apply 2 g topically 4 (four) times a day, Disp: 1 Tube, Rfl: 2    enoxaparin (LOVENOX) 80 mg/0 8 mL, Prn procedure, Disp: , Rfl:     famotidine (PEPCID) 20 mg tablet, take 1 tablet by mouth twice a day, Disp: 60 tablet, Rfl: 1    irbesartan (AVAPRO) 150 mg tablet, Take 1 tablet (150 mg total) by mouth daily at bedtime, Disp: 30 tablet, Rfl: 3    levothyroxine 75 mcg tablet, TK 1 T PO QAM OES FOR 14 DAYS, Disp: , Rfl:     LORazepam (ATIVAN) 1 mg tablet, Take 1 mg by mouth daily at bedtime, Disp: , Rfl:     melatonin 1 mg, Take 3 mg by mouth daily at bedtime, Disp: , Rfl:     metoprolol succinate (TOPROL-XL) 50 mg 24 hr tablet, 50 mg 2 (two) times a day , Disp: , Rfl:     pantoprazole (PROTONIX) 40 mg tablet, Take 1 tablet (40 mg total) by mouth 2 (two) times a day, Disp: 60 tablet, Rfl: 2    Probiotic Product (CULTURELLE PROBIOTICS PO), Take by mouth, Disp: , Rfl:     RA LAXATIVE powder, take 17GM (DISSOLVED IN WATER) by mouth once daily if needed for constipation, Disp: , Rfl:     traZODone (DESYREL) 50 mg tablet, Take 50 mg by mouth daily at bedtime, Disp: , Rfl:     venlafaxine (EFFEXOR) 75 mg tablet, Take 75 mg by mouth daily , Disp: , Rfl:     warfarin (COUMADIN) 2 5 mg tablet, Every Monday, Wednesday and Friday, Disp: , Rfl: 0    warfarin (COUMADIN) 5 mg tablet, Take 1 tablet (5 mg total) by mouth see administration instructions Four days in a week    Every  Tuesday, Thursday, Saturday and Sunday, Disp: 30 tablet, Rfl: 0    conjugated estrogens (PREMARIN) vaginal cream, Apply a pea-sized amount to the vagina 3 times a week, Disp: 42 5 g, Rfl: 3    gabapentin (NEURONTIN) 300 mg capsule, Take 2 capsules (600 mg total) by mouth 2 (two) times a day, Disp: 120 capsule, Rfl: 0    levothyroxine 25 mcg tablet, Take 25 mcg by mouth daily, Disp: , Rfl:       Active Problems     Patient Active Problem List   Diagnosis    Atrial fibrillation ([facility] [address] )    Constipation    Compression fracture of lumbar spine, non-traumatic, with routine healing, subsequent encounter    History of breast cancer    Other specified hypothyroidism    Chronic midline low back pain with right-sided sciatica    Anxiety    Primary osteoarthritis of both knees    Chronic pain syndrome    Lumbar spondylosis    History of mitral valve replacement    Pancreatic abnormality    Diverticulosis    Abdominal distention    Hepatic cyst    Renal cyst    Chronic pain of right knee    Primary osteoarthritis of right knee    Atrophic vaginitis    OAB (overactive bladder)    Microscopic hematuria         Past Medical History     Past Medical History:   Diagnosis Date    Acute blood loss anemia 6/5/2020    Arm contusion, right 2/2 Coumadin toxicity 6/4/2020    Arm swelling 6/4/2020    Atrial fibrillation ([facility] [address] )     Bilateral impacted cerumen 6/18/2020    Breast cancer ([facility] [address] ) 1989    left    Diverticulitis-recent 6/5/2020    History of chemotherapy 1989    left breast    Irregular heart beat     Medicare annual wellness visit, subsequent 6/18/2020    Tick bite with subsequent removal of tick 5/28/2019    Last Assessment & Plan:  On doxy so no need for lymes testing or additional antibiotic         Surgical History     Past Surgical History:   Procedure Laterality Date    APPENDECTOMY      AUGMENTATION MAMMAPLASTY Right 1989 implant    CARDIAC ELECTROPHYSIOLOGY STUDY, ESOPHAGEAL      CARDIAC VALVE REPLACEMENT      MV    COLONOSCOPY      HYSTERECTOMY  2002    MASTECTOMY Left 1989    OOPHORECTOMY Bilateral 2002    TONSILLECTOMY           Family History     Family History   Problem Relation Age of Onset    Diabetes Mother     Cancer Mother     Breast cancer Mother 40    Colon cancer Father     Colon cancer Paternal Grandmother     Colon cancer Paternal Grandfather     No Known Problems Daughter     No Known Problems Daughter     Breast cancer Maternal Aunt 39    Breast cancer Maternal Aunt         age unknown    Breast cancer Maternal Aunt         age unknown    Breast cancer Maternal Aunt         age unknown    No Known Problems Paternal Aunt          Social History     Social History     Social History     Tobacco Use   Smoking Status Never Smoker   Smokeless Tobacco Never Used         Pertinent Lab Values     Lab Results   Component Value Date    CREATININE 0 84 06/12/2020                 Pertinent Imaging      no new imaging for my review

## 2021-07-03 ENCOUNTER — HOSPITAL ENCOUNTER (OUTPATIENT)
Dept: ULTRASOUND IMAGING | Facility: HOSPITAL | Age: 76
Discharge: HOME | End: 2021-07-03
Payer: MEDICARE

## 2021-07-03 DIAGNOSIS — R31.29 MICROSCOPIC HEMATURIA: ICD-10-CM

## 2021-07-03 DIAGNOSIS — N28.1 RENAL CYST: ICD-10-CM

## 2021-07-03 PROCEDURE — 76770 US EXAM ABDO BACK WALL COMP: CPT

## 2021-07-08 ENCOUNTER — TELEPHONE (OUTPATIENT)
Dept: UROLOGY | Facility: MEDICAL CENTER | Age: 76
End: 2021-07-08

## 2021-07-08 NOTE — TELEPHONE ENCOUNTER
Patient had a procedure done recently, and wants to know if she can have intercourse? Patient can be reached at [phone]

## 2021-07-08 NOTE — TELEPHONE ENCOUNTER
Patient recently had cystoscopy done on 7/1/21 with Dr Sharda Clancy, cystoscopy showed no urologic abnormalities, significant vaginal mucosal atrophy noted  Post cystoscopy, there is not restrictions  Returned call to patient, advised no restrictions

## 2021-07-14 DIAGNOSIS — N95.2 ATROPHIC VAGINITIS: Primary | ICD-10-CM

## 2021-07-14 RX ORDER — ESTRADIOL 0.1 MG/G
CREAM VAGINAL
Qty: 42.5 G | Refills: 3 | Status: SHIPPED | OUTPATIENT
Start: 2021-07-14

## 2021-07-14 NOTE — TELEPHONE ENCOUNTER
Premarin vaginal cream is cost-prohibitive and the patient is requesting a generic alternative that's also cost-effective  Message forwarded to the [facility] ([facility] [place] for further recommendation

## 2021-07-14 NOTE — TELEPHONE ENCOUNTER
Retrieved phone message from voice mail  Patient is calling requesting a different prescription   Was prescribed premarin by Dr Sarah Webb  Patient states it is to expensive and would like something else   Would prefer a generic due to her insurance if possible  Call back phone number is [phone]

## 2021-07-16 ENCOUNTER — TELEPHONE (OUTPATIENT)
Dept: UROLOGY | Facility: AMBULATORY SURGERY CENTER | Age: 76
End: 2021-07-16

## 2021-07-19 ENCOUNTER — TELEPHONE (OUTPATIENT)
Dept: UROLOGY | Facility: CLINIC | Age: 76
End: 2021-07-19

## 2021-07-19 NOTE — TELEPHONE ENCOUNTER
Called and mad patient aware  Patient asking if a copy of the report can be sent to her home address  Patient mailed a copy of report      ----- Message from Cindi Cohen PA-C sent at 7/16/2021  1:22 PM EDT -----    Please let the patient know that her ultrasound reveals a stable renal cyst that was initially evaluated on her previous MRI  Patient's previous MRI did recommend a follow-up MRI in July of 2020 for continued pancreatic surveillance  Her renal cysts will be able to be monitored at that follow-up

## 2021-08-14 DIAGNOSIS — I48.0 PAROXYSMAL ATRIAL FIBRILLATION (HCC): ICD-10-CM

## 2021-08-14 RX ORDER — AMIODARONE HYDROCHLORIDE 200 MG/1
TABLET ORAL
Qty: 90 TABLET | Refills: 3 | Status: SHIPPED | OUTPATIENT
Start: 2021-08-14

## 2021-08-25 DIAGNOSIS — R10.13 EPIGASTRIC PAIN: ICD-10-CM

## 2021-08-25 RX ORDER — PANTOPRAZOLE SODIUM 40 MG/1
TABLET, DELAYED RELEASE ORAL
Qty: 60 TABLET | Refills: 2 | Status: SHIPPED | OUTPATIENT
Start: 2021-08-25 | End: 2021-11-19

## 2021-09-02 ENCOUNTER — OFFICE VISIT (OUTPATIENT)
Dept: CARDIOLOGY CLINIC | Facility: CLINIC | Age: 76
End: 2021-09-02
Payer: MEDICARE

## 2021-09-02 VITALS
OXYGEN SATURATION: 99 % | WEIGHT: 165 LBS | HEART RATE: 65 BPM | BODY MASS INDEX: 27.46 KG/M2 | DIASTOLIC BLOOD PRESSURE: 86 MMHG | SYSTOLIC BLOOD PRESSURE: 156 MMHG

## 2021-09-02 DIAGNOSIS — I48.0 PAROXYSMAL ATRIAL FIBRILLATION (HCC): Primary | ICD-10-CM

## 2021-09-02 DIAGNOSIS — Z95.4 S/P MITRAL VALVE REPLACEMENT WITH METALLIC VALVE: ICD-10-CM

## 2021-09-02 DIAGNOSIS — I35.1 AORTIC VALVE INSUFFICIENCY, ETIOLOGY OF CARDIAC VALVE DISEASE UNSPECIFIED: ICD-10-CM

## 2021-09-02 DIAGNOSIS — I10 ESSENTIAL HYPERTENSION: ICD-10-CM

## 2021-09-02 PROCEDURE — 93000 ELECTROCARDIOGRAM COMPLETE: CPT | Performed by: INTERNAL MEDICINE

## 2021-09-02 PROCEDURE — 1123F ACP DISCUSS/DSCN MKR DOCD: CPT | Performed by: INTERNAL MEDICINE

## 2021-09-02 PROCEDURE — 99214 OFFICE O/P EST MOD 30 MIN: CPT | Performed by: INTERNAL MEDICINE

## 2021-09-02 RX ORDER — HYDROCHLOROTHIAZIDE 25 MG/1
25 TABLET ORAL DAILY
Qty: 30 TABLET | Refills: 0 | Status: SHIPPED | OUTPATIENT
Start: 2021-09-02 | End: 2021-09-27

## 2021-09-02 NOTE — PROGRESS NOTES
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 with [facility] 23 mm in [place] in 2002, paroxysmal Afib  Status post cardioversion in December 2020, hyperlipidemia, anxiety, breast cancer presented for cardiology follow-up      Patient had Holter monitoring done in May 2021 which showed persistent atrial fibrillation  She was offered further management or atrial fibrillation but she opted to follow-up with primary cardiologist in [place]  In July 2021 patient was having worsening palpitation and shortness of breath  She underwent successful NORMA guided electrical cardioversion with primary cardiologist in [place] in July 2021  She is feeling better since then  She was noted to have bradycardia post cardioversion and medications were adjusted     At present time patient denies chest pain, shortness of breath at rest, palpitation, dizziness, orthopnea, leg edema or loss of consciousness   She does have shortness of breath on exertions which is chronic and better since cardioversion  Her home blood pressure stays around 175-80 and sometime it goes to 122-753 with diastolic around 80   patient also has limited exercise tolerance due to severe right knee pain    She is following with orthopedic     she denies any headache or visual changes    current medications reviewed    no recent labs available for review  NORMA  note reviewed from Care everywhere as of 07/26/2021    Review of Systems:   all review of system negative except as mentioned above    Patient Active Problem List   Diagnosis    Atrial fibrillation ([facility] )    Constipation    Compression fracture of lumbar spine, non-traumatic, with routine healing, subsequent encounter    History of breast cancer    Other specified hypothyroidism    Chronic midline low back pain with right-sided sciatica    Anxiety    Primary osteoarthritis of both knees    Chronic pain syndrome    Lumbar spondylosis    History of mitral valve replacement    Pancreatic abnormality    Diverticulosis    Abdominal distention    Hepatic cyst    Renal cyst    Chronic pain of right knee    Primary osteoarthritis of right knee    Atrophic vaginitis    OAB (overactive bladder)    Microscopic hematuria     Past Medical History:   Diagnosis Date    Acute blood loss anemia 6/5/2020    Arm contusion, right 2/2 Coumadin toxicity 6/4/2020    Arm swelling 6/4/2020    Atrial fibrillation ([facility] )     Bilateral impacted cerumen 6/18/2020    Breast cancer ([facility] ) 1989    left    Diverticulitis-recent 6/5/2020    History of chemotherapy 1989    left breast    Irregular heart beat     Medicare annual wellness visit, subsequent 6/18/2020    Tick bite with subsequent removal of tick 5/28/2019    Last Assessment & Plan:  On doxy so no need for lymes testing or additional antibiotic     Social History     Socioeconomic History    Marital status:       Spouse name: Not on file    Number of children: Not on file    Years of education: Not on file    Highest education level: Not on file   Occupational History    Not on file   Tobacco Use    Smoking status: Never Smoker    Smokeless tobacco: Never Used   Vaping Use    Vaping Use: Never used   Substance and Sexual Activity    Alcohol use: Yes     Comment: occasionally    Drug use: Never    Sexual activity: Not on file   Other Topics Concern    Not on file   Social History Narrative    Lives with partner -- [facility] time [facility] and [facility] Strain:     Difficulty of Paying Living Expenses:    Food Insecurity:     Worried About [address] in the Last Year:    [address] in the Last Year:    Transportation Needs:     Lack of Transportation (Medical):      Lack of Transportation (Non-Medical):    Physical Activity:     Days of Exercise per Week:     Minutes of Exercise per Session:    Stress:     Feeling of Stress :    Social Connections:     Frequency of Communication with Friends and Family:     Frequency of Social Gatherings with Friends and Family:     Attends Taoism Services:     Active Member of Clubs or Organizations:     Attends Club or Organization Meetings:     Marital Status:    Intimate Partner Violence:     Fear of Current or Ex-Partner:     Emotionally Abused:     Physically Abused:     Sexually Abused:       Family History   Problem Relation Age of Onset    Diabetes Mother     Cancer Mother     Breast cancer Mother 40    Colon cancer Father     Colon cancer Paternal Grandmother     Colon cancer Paternal Grandfather     No Known Problems Daughter     No Known Problems Daughter     Breast cancer Maternal Aunt 39    Breast cancer Maternal Aunt         age unknown    Breast cancer Maternal Aunt         age unknown    Breast cancer Maternal Aunt         age unknown    No Known Problems Paternal Aunt      Past Surgical History:   Procedure Laterality Date    APPENDECTOMY      AUGMENTATION MAMMAPLASTY Right 1989    implant    CARDIAC ELECTROPHYSIOLOGY STUDY, ESOPHAGEAL      CARDIAC VALVE REPLACEMENT      MV    COLONOSCOPY      HYSTERECTOMY  2002    MASTECTOMY Left 1989    OOPHORECTOMY Bilateral 2002    TONSILLECTOMY         Current Outpatient Medications:     acetaminophen (TYLENOL) 325 mg tablet, Take 2 tablets (650 mg total) by mouth every 6 (six) hours as needed for mild pain, headaches or fever, Disp: 30 tablet, Rfl: 0    amiodarone 200 mg tablet, take 1 tablet by mouth daily, Disp: 90 tablet, Rfl: 3    diclofenac sodium (VOLTAREN) 1 %, Apply 2 g topically 4 (four) times a day, Disp: 1 Tube, Rfl: 2    enoxaparin (LOVENOX) 80 mg/0 8 mL, Prn procedure, Disp: , Rfl:     irbesartan (AVAPRO) 150 mg tablet, Take 1 tablet (150 mg total) by mouth daily at bedtime, Disp: 30 tablet, Rfl: 3    levothyroxine 25 mcg tablet, Take 25 mcg by mouth daily, Disp: , Rfl:     levothyroxine 75 mcg tablet, TK 1 T PO QAM OES FOR 14 DAYS, Disp: , Rfl:     LORazepam (ATIVAN) 1 mg tablet, Take 1 mg by mouth daily at bedtime, Disp: , Rfl:     melatonin 1 mg, Take 3 mg by mouth daily at bedtime, Disp: , Rfl:     metoprolol succinate (TOPROL-XL) 50 mg 24 hr tablet, 50 mg 2 (two) times a day , Disp: , Rfl:     pantoprazole (PROTONIX) 40 mg tablet, take 1 tablet by mouth twice a day, Disp: 60 tablet, Rfl: 2    Probiotic Product (CULTURELLE PROBIOTICS PO), Take by mouth, Disp: , Rfl:     RA LAXATIVE powder, take 17GM (DISSOLVED IN WATER) by mouth once daily if needed for constipation, Disp: , Rfl:     traZODone (DESYREL) 50 mg tablet, Take 50 mg by mouth daily at bedtime, Disp: , Rfl:     venlafaxine (EFFEXOR) 75 mg tablet, Take 75 mg by mouth daily , Disp: , Rfl:     warfarin (COUMADIN) 2 5 mg tablet, Every Monday, Wednesday and Friday, Disp: , Rfl: 0    warfarin (COUMADIN) 5 mg tablet, Take 1 tablet (5 mg total) by mouth see administration instructions Four days in a week    Every  Tuesday, Thursday, Saturday and Sunday, Disp: 30 tablet, Rfl: 0    calcium polycarbophil (FIBERCON) 625 mg tablet, Take 625 mg by mouth daily (Patient not taking: Reported on 9/2/2021), Disp: , Rfl:     estradiol (ESTRACE) 0 1 mg/g vaginal cream, Apply a pea-sized amount to the vaginal opening 3 times a week (Mon-Wed-Fri) (Patient not taking: Reported on 9/2/2021), Disp: 42 5 g, Rfl: 3    famotidine (PEPCID) 20 mg tablet, take 1 tablet by mouth twice a day (Patient not taking: Reported on 9/2/2021), Disp: 60 tablet, Rfl: 1    gabapentin (NEURONTIN) 300 mg capsule, Take 2 capsules (600 mg total) by mouth 2 (two) times a day (Patient not taking: Reported on 9/2/2021), Disp: 120 capsule, Rfl: 0    hydrochlorothiazide (HYDRODIURIL) 25 mg tablet, Take 1 tablet (25 mg total) by mouth daily, Disp: 30 tablet, Rfl: 0  Allergies   Allergen Reactions    Penicillins Anaphylaxis     Other reaction(s): Anaphylaxis      Iodides      Other reaction(s): Rash         Labs:  Hospital Outpatient Visit on 06/17/2021   Component Date Value    Case Report 06/17/2021                      Value:Surgical Pathology Report                         Case: [id number]                                   Authorizing Provider:  Palomo Holliday MD      Collected:           06/17/2021 1045              Ordering Location:      [facility]       Received:            06/17/2021 [address] [place] [address] Endoscopy                                                             Pathologist:           Colletta Briar, MD                                                                Specimens:   A) - Stomach, antrum                                                                                B) - Stomach, body polyp                                                                            C) - Stomach, fundus                                                                       Final Diagnosis 06/17/2021                      Value: This result contains rich text formatting which cannot be displayed here   Additional Information 06/17/2021                      Value: This result contains rich text formatting which cannot be displayed here  Reed Dillard Gross Description 06/17/2021                      Value: This result contains rich text formatting which cannot be displayed here     Consult on 04/01/2021   Component Date Value    LEUKOCYTE ESTERASE,UA 04/01/2021 ++     NITRITE,UA 04/01/2021 -     SL AMB POCT UROBILINOGEN 04/01/2021 0 2     POCT URINE PROTEIN 04/01/2021 -      PH,UA 04/01/2021 7 0     BLOOD,UA 04/01/2021 +++     SPECIFIC GRAVITY,UA 04/01/2021 1 005     KETONES,UA 04/01/2021 -     BILIRUBIN,UA 04/01/2021 -     GLUCOSE, UA 04/01/2021 -      COLOR,UA 04/01/2021 yellow     CLARITY,UA 04/01/2021 clear     POST-VOID RESIDUAL VOLUM* 04/01/2021 6     Clarity, UA 04/01/2021 Clear     Color, UA 04/01/2021 Yellow     Specific Gravity, UA 04/01/2021 1 007     pH, UA 04/01/2021 7 0     Glucose, UA 04/01/2021 Negative     Ketones, UA 04/01/2021 Negative     Blood, UA 04/01/2021 Moderate*    Protein, UA 04/01/2021 Negative     Nitrite, UA 04/01/2021 Negative     Bilirubin, UA 04/01/2021 Negative     Urobilinogen, UA 04/01/2021 0 2     Leukocytes, UA 04/01/2021 Moderate*    WBC, UA 04/01/2021 4-10*    RBC, UA 04/01/2021 2-4     Hyaline Casts, UA 04/01/2021 None Seen     Bacteria, UA 04/01/2021 Occasional     Epithelial Cells 04/01/2021 None Seen     Urine Culture 04/01/2021 <10,000 cfu/ml       Imaging: No results found      Physical Exam:  General:   average built, awake, alert and oriented x3, not in distress  Neck: supple, no JVD  Eyes: PERRL, conjunctiva normal  Lungs:  Bilateral air entry positive, no wheeze/rhonchi or crackle  Heart:  S1-S2 normal, no murmur  Abdomen:  Soft ,nondistended ,nontender, bowel sounds positive  Extremities:  No leg edema, no deformity, ROM normal  Neuro:  Moving all extremities, speech clear  Skin: warm, no rash    /86 (BP Location: Left arm, Patient Position: Sitting, Cuff Size: Standard)   Pulse 65   Wt 74 8 kg (165 lb)   SpO2 99%   BMI 27 46 kg/m²     Cardiographics :  ECG:  Sinus rhythm, normal axis, moderate voltage criteria for LVH     transesophageal echocardiogram in July 2021 showed preserved LVEF, normally functioning mitral wall, mild-to-moderate AI, mild MR    EKG on last clinic visit sinus rhythm, normal axis  Echocardiogram in August 2020 showed EF 65%, no regional wall motion abnormality, mild concentric LVH, moderate to marked mitral annular calcification,  mitral prosthesis with normal function, trace AI, trace to mild TR, trace PI    Assessment:    1  Recurrent Afib   Status post successful NORMA guided electrical cardioversion in July 2021 in [place]( had NORMA guided electrical cardioversion in November 2020 in [place])   Currently in sinus rhythm    2  Hypertension  Not well controlled  3  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 in 2002  On Coumadin  4    Mild-to-moderate AI  5  Hyperlipidemia  Patient has followed up with primary care physician as per patient is better on last blood work  6  Left breast cancer status post chemo/mastectomy in 1989  7  Right knee pain  8  History of right renal cyst    Recommendations:     I would add hydrochlorothiazide for optimal blood pressure control    patient to continue amiodarone 200 mg, irbesartan 150, Toprol-XL 50 mg twice a day  Continue Coumadin     she was advised to monitor blood pressure at home and call us back if it stays more than 130/80   risk and benefit of low-dose aspirin discussed with patient  She will think about it   advised to take low-salt, low-fat/ low-cholesterol diet and try to lose weight   patient is getting INR checked with primary cardiologist in [place]    patient to follow-up with primary cardiologist as per schedule  Return to clinic in 6 months or early as needed   Above all discussed with patient    Patient understands and agrees

## 2021-09-25 DIAGNOSIS — I10 ESSENTIAL HYPERTENSION: ICD-10-CM

## 2021-09-27 RX ORDER — HYDROCHLOROTHIAZIDE 25 MG/1
TABLET ORAL
Qty: 30 TABLET | Refills: 0 | Status: SHIPPED | OUTPATIENT
Start: 2021-09-27 | End: 2021-10-04

## 2021-10-04 DIAGNOSIS — I10 ESSENTIAL HYPERTENSION: ICD-10-CM

## 2021-10-04 RX ORDER — HYDROCHLOROTHIAZIDE 25 MG/1
TABLET ORAL
Qty: 30 TABLET | Refills: 0 | Status: SHIPPED | OUTPATIENT
Start: 2021-10-04 | End: 2021-11-29

## 2021-10-06 ENCOUNTER — OFFICE VISIT (OUTPATIENT)
Dept: UROLOGY | Facility: CLINIC | Age: 76
End: 2021-10-06
Payer: MEDICARE

## 2021-10-06 VITALS
HEIGHT: 65 IN | WEIGHT: 167 LBS | BODY MASS INDEX: 27.82 KG/M2 | SYSTOLIC BLOOD PRESSURE: 102 MMHG | HEART RATE: 72 BPM | DIASTOLIC BLOOD PRESSURE: 68 MMHG

## 2021-10-06 DIAGNOSIS — N95.2 ATROPHIC VAGINITIS: Primary | ICD-10-CM

## 2021-10-06 DIAGNOSIS — N32.81 OAB (OVERACTIVE BLADDER): ICD-10-CM

## 2021-10-06 PROCEDURE — 99214 OFFICE O/P EST MOD 30 MIN: CPT | Performed by: PHYSICIAN ASSISTANT

## 2021-10-06 RX ORDER — LEVOTHYROXINE SODIUM 88 UG/1
88 TABLET ORAL DAILY
COMMUNITY

## 2021-11-19 DIAGNOSIS — R10.13 EPIGASTRIC PAIN: ICD-10-CM

## 2021-11-19 RX ORDER — PANTOPRAZOLE SODIUM 40 MG/1
TABLET, DELAYED RELEASE ORAL
Qty: 60 TABLET | Refills: 2 | Status: SHIPPED | OUTPATIENT
Start: 2021-11-19 | End: 2022-02-24

## 2021-11-29 DIAGNOSIS — I10 ESSENTIAL HYPERTENSION: ICD-10-CM

## 2021-11-29 RX ORDER — HYDROCHLOROTHIAZIDE 25 MG/1
TABLET ORAL
Qty: 30 TABLET | Refills: 0 | Status: SHIPPED | OUTPATIENT
Start: 2021-11-29 | End: 2021-12-29

## 2021-12-28 DIAGNOSIS — I10 ESSENTIAL HYPERTENSION: ICD-10-CM

## 2021-12-30 RX ORDER — HYDROCHLOROTHIAZIDE 25 MG/1
TABLET ORAL
Qty: 30 TABLET | Refills: 11 | Status: SHIPPED | OUTPATIENT
Start: 2021-12-30

## 2022-02-01 ENCOUNTER — OFFICE VISIT (OUTPATIENT)
Dept: RHEUMATOLOGY | Facility: CLINIC | Age: 77
End: 2022-02-01
Payer: MEDICARE

## 2022-02-01 VITALS — WEIGHT: 167 LBS | BODY MASS INDEX: 27.82 KG/M2 | HEIGHT: 65 IN

## 2022-02-01 DIAGNOSIS — M54.2 NECK PAIN: ICD-10-CM

## 2022-02-01 DIAGNOSIS — Z85.3 HISTORY OF BREAST CANCER: ICD-10-CM

## 2022-02-01 DIAGNOSIS — Z79.83 LONG TERM USE OF BISPHOSPHONATES: ICD-10-CM

## 2022-02-01 DIAGNOSIS — M81.0 AGE-RELATED OSTEOPOROSIS WITHOUT CURRENT PATHOLOGICAL FRACTURE: Primary | ICD-10-CM

## 2022-02-01 DIAGNOSIS — M15.0 PRIMARY GENERALIZED (OSTEO)ARTHRITIS: ICD-10-CM

## 2022-02-01 PROCEDURE — 99215 OFFICE O/P EST HI 40 MIN: CPT | Performed by: INTERNAL MEDICINE

## 2022-02-01 RX ORDER — ALENDRONATE SODIUM 70 MG/1
TABLET ORAL
COMMUNITY
Start: 2021-12-24 | End: 2022-02-01 | Stop reason: SDUPTHER

## 2022-02-01 RX ORDER — ALENDRONATE SODIUM 70 MG/1
70 TABLET ORAL
Qty: 12 TABLET | Refills: 3 | Status: SHIPPED | OUTPATIENT
Start: 2022-02-01

## 2022-02-01 NOTE — PROGRESS NOTES
Assessment and Plan:   Ms Robina Ferreira a 43-year-old female with history significant for postmenopausal osteoporosis with resultant multiple spinal compression fractures, who presents for a follow-up  She is currently on oral alendronate 70 mg once weekly  # Postmenopausal osteoporosis  - Merrill Contreras presents today for a follow-up of osteoporosis with a history of spinal compression fractures  She has been stable since the last office visit and I advised her to continue oral alendronate 70 mg once weekly  I also requested she try to be compliant with taking the calcium and vitamin-D supplements and continue to obtain this from her diet  She will continue with weight-bearing exercises  I will update a DEXA scan at this time and if there is any decline noted in her bone mineral density I will likely discontinue the alendronate and start denosumab  # Neck pain    - A cervical spine x-ray will be obtained and I requested she discuss her back complaints with her primary care physician at their upcoming appointment  # Primary generalized osteoarthritis   - She may continue the Tylenol as needed and also the topical diclofenac gel  - I requested she follow up with Orthopedics and Pain Management for continued monitoring of the right knee advanced osteoarthritis        Plan:  Diagnoses and all orders for this visit:    Age-related osteoporosis without current pathological fracture  -     DXA bone density spine hip and pelvis; Future  -     alendronate (FOSAMAX) 70 mg tablet; Take 1 tablet (70 mg total) by mouth every 7 days    Long term use of bisphosphonates    Neck pain  -     XR spine cervical complete 4 or 5 vw non injury; Future    Primary generalized (osteo)arthritis    History of breast cancer    Other orders  -     Discontinue: alendronate (FOSAMAX) 70 mg tablet; take 1 tablet by mouth every 7 days      Activities as tolerated  Exercise: try to maintain a low impact exercise regimen as much as possible   Walk for 30 minutes a day for at least 3 days a week  Continue other medications as prescribed by PCP and other specialists  RTC in 1 year         HPI    INITIAL VISIT NOTE (10/2020):  Ms Terrell Cisse a 61-year-old female with history significant for possible rheumatoid arthritis and postmenopausal osteoporosis with resultant multiple spinal compression fractures, who presents for further evaluation of these conditions   She is referred by Dr Jhony Lebron for a rheumatology consult      Patient reports she was seen by a rheumatologist approximately 1 and half years ago in [place] and was informed that she has positive rheumatoid serologies but there was uncertainty in regards to a diagnosis of rheumatoid arthritis   She was never started on DMARDs and her symptoms were managed with short courses of oral prednisone  [place] Session does not have any prior records available for today's appointment  Darian Rivera regards to her joint pains she states that this has been going on for approximately 2 years now and has primarily affected the base of her left thumb and the middle finger on her right hand   She does not experience joint pains throughout the rest of her fingers   She also describes an achiness in her bilateral shoulders   She has chronic pain of her right hip and right knee where she is known to have osteoarthritis and is currently following with Orthopedics  D[place] Session did receive a right knee intra-articular cortisone injection which was ineffective and has started viscosupplementation   She denies any significant pain of her wrists, elbows, left hip, left knee, ankles or feet   She has noticed swelling only affecting her right knee   She experiences morning stiffness which mostly affects her right knee, right hip and bilateral shoulders, and takes a few hours to improve   She will take Tylenol on an as-needed basis which does help her but she tries to avoid doing this on a daily basis   She avoids NSAIDs as she is on chronic anticoagulation      In regards to the osteoporosis she mentions only being informed of this recently when she established with Pain Management for treatment of the spinal compression fractures   She denies any other history of fragility fractures   She attained menopause in her early 45s due to chemotherapy that was utilized for breast cancer  Boston Macias does take daily calcium and vitamin-D supplements but does not perform any weight-bearing exercises  [place] Macias is unsure of a family history of osteoporosis   She has not been on long-term chronic steroids      She denies fevers, unintentional weight loss, inflammatory eye disease, chronic skin rashes, psoriasis, inflammatory bowel disease or family history of autoimmune disease  Boston Macias states her grandmother may have had osteoarthritis      Of note x-rays of her bilateral knees done in July 2020 showed osteoarthritis   An x-ray of her lumbar spine done in August 2020 showed severe compression fracture of L1 as well as compression of the superior endplate of L3         6/98/1064:  Patient presents for a follow-up today  Donny Voss reviewed the labs which showed a normal rheumatoid factor, anti CCP antibody, ESR, CRP, Sjogren's antibodies, SPEP, vitamin-D, hepatitis panel and PTH   The x-rays of her hands, feet and left shoulder were normal   She also had an MRI of her right knee done which showed moderate to severe osteoarthritis and large complex tears of the medial and lateral menisci   We also reviewed the DEXA scan which showed osteopenia with a T-score of -2 3 of the left hip along with a FRAX score of 4% and 14% for a 10 year risk of hip and major osteoporotic fracture, respectively      She has been following with Orthopedics for the right knee osteoarthritis and received an intra-articular cortisone injection on 11/05/2020 which did not help   I advised her since she had the injection recently I would not be able to repeat this today   She was also referred to Dr Pam Pretty for consideration of a nerve block as she may not be a surgical candidate due to her medical comorbidities  Deon Rosales does manage her symptoms with 2 tablets of Tylenol twice daily         2/1/2022:  Patient presents for a follow-up of osteoporosis  She is currently on oral alendronate 70 mg once weekly  She denies any interim fractures  She has not been consistent with taking the daily calcium and vitamin-D supplements but does try to consume dairy products  She performs weight-bearing exercises  Additionally she reports neck pain that has been radiating down her back  No other acute complaints at this time  The following portions of the patient's history were reviewed and updated as appropriate: allergies, current medications, past family history, past medical history, past social history, past surgical history and problem list       Review of Systems  Constitutional: Negative for weight change, fevers, chills, night sweats, fatigue  ENT/Mouth: Negative for hearing changes, ear pain, nasal congestion, sinus pain, hoarseness, sore throat, rhinorrhea, swallowing difficulty  Eyes: Negative for pain, redness, discharge, vision changes  Cardiovascular: Negative for chest pain, SOB, palpitations  Respiratory: Negative for cough, sputum, wheezing, dyspnea  Gastrointestinal: Negative for nausea, vomiting, diarrhea, constipation, pain, heartburn  Genitourinary: Negative for dysuria, urinary frequency, hematuria  Musculoskeletal: As per HPI  Skin: Negative for skin rash, color changes  Neuro: Negative for weakness, numbness, tingling, loss of consciousness  Psych: Negative for anxiety, depression  Heme/Lymph: Negative for easy bruising, bleeding, lymphadenopathy          Past Medical History:   Diagnosis Date    Acute blood loss anemia 6/5/2020    Arm contusion, right 2/2 Coumadin toxicity 6/4/2020    Arm swelling 6/4/2020    Atrial fibrillation (HCC)     Bilateral impacted cerumen 6/18/2020    Breast cancer [facility]) 1989    left    Diverticulitis-recent 6/5/2020    History of chemotherapy 1989    left breast    Irregular heart beat     Medicare annual wellness visit, subsequent 6/18/2020    Tick bite with subsequent removal of tick 5/28/2019    Last Assessment & Plan:  On doxy so no need for lymes testing or additional antibiotic       Past Surgical History:   Procedure Laterality Date    APPENDECTOMY      AUGMENTATION MAMMAPLASTY Right 1989    implant    CARDIAC ELECTROPHYSIOLOGY STUDY, ESOPHAGEAL      CARDIAC VALVE REPLACEMENT      MV    COLONOSCOPY      HYSTERECTOMY  2002    MASTECTOMY Left 1989    OOPHORECTOMY Bilateral 2002    TONSILLECTOMY         Social History     Socioeconomic History    Marital status:       Spouse name: Not on file    Number of children: Not on file    Years of education: Not on file    Highest education level: Not on file   Occupational History    Not on file   Tobacco Use    Smoking status: Never Smoker    Smokeless tobacco: Never Used   Vaping Use    Vaping Use: Never used   Substance and Sexual Activity    Alcohol use: Yes     Comment: occasionally    Drug use: Never    Sexual activity: Not on file   Other Topics Concern    Not on file   Social History Narrative    Lives with partner -- splits time between here and [address]      Social Determinants of Health     Financial Resource Strain: Not on file   Food Insecurity: Not on file   Transportation Needs: Not on file   Physical Activity: Not on file   Stress: Not on file   Social Connections: Not on file   Intimate Partner Violence: Not on file   Housing Stability: Not on file       Family History   Problem Relation Age of Onset    Diabetes Mother     Cancer Mother     Breast cancer Mother 40    Colon cancer Father     Colon cancer Paternal Grandmother     Colon cancer Paternal Grandfather     No Known Problems Daughter     No Known Problems Daughter     Breast cancer Maternal Aunt 39  Breast cancer Maternal Aunt         age unknown   Miley Mccormick Breast cancer Maternal Aunt         age unknown   Miley Mccormick Breast cancer Maternal Aunt         age unknown    No Known Problems Paternal Aunt        Allergies   Allergen Reactions    Penicillins Anaphylaxis     Other reaction(s):  Anaphylaxis      Iodides      Other reaction(s): Rash         Current Outpatient Medications:     acetaminophen (TYLENOL) 325 mg tablet, Take 2 tablets (650 mg total) by mouth every 6 (six) hours as needed for mild pain, headaches or fever, Disp: 30 tablet, Rfl: 0    alendronate (FOSAMAX) 70 mg tablet, Take 1 tablet (70 mg total) by mouth every 7 days, Disp: 12 tablet, Rfl: 3    amiodarone 200 mg tablet, take 1 tablet by mouth daily, Disp: 90 tablet, Rfl: 3    diclofenac sodium (VOLTAREN) 1 %, Apply 2 g topically 4 (four) times a day, Disp: 1 Tube, Rfl: 2    enoxaparin (LOVENOX) 80 mg/0 8 mL, Prn procedure, Disp: , Rfl:     hydrochlorothiazide (HYDRODIURIL) 25 mg tablet, take 1 tablet by mouth once daily, Disp: 30 tablet, Rfl: 11    levothyroxine 25 mcg tablet, Take 25 mcg by mouth daily  , Disp: , Rfl:     levothyroxine 75 mcg tablet, TK 1 T PO QAM OES FOR 14 DAYS, Disp: , Rfl:     levothyroxine 88 mcg tablet, Take 88 mcg by mouth daily, Disp: , Rfl:     LORazepam (ATIVAN) 1 mg tablet, Take 1 mg by mouth daily at bedtime, Disp: , Rfl:     melatonin 1 mg, Take 3 mg by mouth daily at bedtime as needed , Disp: , Rfl:     metoprolol succinate (TOPROL-XL) 50 mg 24 hr tablet, 50 mg 2 (two) times a day , Disp: , Rfl:     pantoprazole (PROTONIX) 40 mg tablet, take 1 tablet by mouth twice a day, Disp: 60 tablet, Rfl: 2    RA LAXATIVE powder, take 17GM (DISSOLVED IN WATER) by mouth once daily if needed for constipation, Disp: , Rfl:     traZODone (DESYREL) 50 mg tablet, Take 50 mg by mouth daily at bedtime, Disp: , Rfl:     venlafaxine (EFFEXOR) 75 mg tablet, Take 75 mg by mouth daily , Disp: , Rfl:     warfarin (COUMADIN) 2 5 mg tablet, Every Monday, Wednesday and Friday, Disp: , Rfl: 0    calcium polycarbophil (FIBERCON) 625 mg tablet, Take 625 mg by mouth daily (Patient not taking: Reported on 9/2/2021), Disp: , Rfl:     estradiol (ESTRACE) 0 1 mg/g vaginal cream, Apply a pea-sized amount to the vaginal opening 3 times a week (Mon-Wed-Fri) (Patient not taking: Reported on 9/2/2021), Disp: 42 5 g, Rfl: 3    famotidine (PEPCID) 20 mg tablet, take 1 tablet by mouth twice a day (Patient not taking: Reported on 9/2/2021), Disp: 60 tablet, Rfl: 1    gabapentin (NEURONTIN) 300 mg capsule, Take 2 capsules (600 mg total) by mouth 2 (two) times a day (Patient not taking: Reported on 9/2/2021), Disp: 120 capsule, Rfl: 0    irbesartan (AVAPRO) 150 mg tablet, Take 1 tablet (150 mg total) by mouth daily at bedtime, Disp: 30 tablet, Rfl: 3    Probiotic Product (CULTURELLE PROBIOTICS PO), Take by mouth (Patient not taking: Reported on 10/6/2021), Disp: , Rfl:     warfarin (COUMADIN) 5 mg tablet, Take 1 tablet (5 mg total) by mouth see administration instructions Four days in a week  Every  Tuesday, Thursday, Saturday and Sunday, Disp: 30 tablet, Rfl: 0      Objective:    Vitals:    02/01/22 1108   Weight: 75 8 kg (167 lb)   Height: 5' 5" (1 651 m)       Physical Exam  General: Well appearing, well nourished, in no distress  Oriented x 3, normal mood and affect  Ambulating without difficulty  Skin: Good turgor, no rash, unusual bruising or prominent lesions  Hair: Normal texture and distribution  Nails: Normal color, no deformities  HEENT:  Head: Normocephalic, atraumatic  Eyes: Conjunctiva clear, sclera non-icteric, EOM intact  Extremities: No amputations or deformities, cyanosis, edema  Musculoskeletal:   Hands - osteoarthritic changes noted  Neurologic: Alert and oriented  No focal neurological deficits appreciated  Psychiatric: Normal mood and affect  MALKA Chatterjee    Rheumatology

## 2022-02-24 DIAGNOSIS — R10.13 EPIGASTRIC PAIN: ICD-10-CM

## 2022-02-24 RX ORDER — PANTOPRAZOLE SODIUM 40 MG/1
TABLET, DELAYED RELEASE ORAL
Qty: 60 TABLET | Refills: 2 | Status: SHIPPED | OUTPATIENT
Start: 2022-02-24 | End: 2022-06-05 | Stop reason: SDUPTHER

## 2022-03-10 ENCOUNTER — OFFICE VISIT (OUTPATIENT)
Dept: CARDIOLOGY CLINIC | Facility: CLINIC | Age: 77
End: 2022-03-10
Payer: MEDICARE

## 2022-03-10 VITALS
RESPIRATION RATE: 18 BRPM | BODY MASS INDEX: 28.66 KG/M2 | HEART RATE: 71 BPM | DIASTOLIC BLOOD PRESSURE: 76 MMHG | SYSTOLIC BLOOD PRESSURE: 122 MMHG | OXYGEN SATURATION: 98 % | WEIGHT: 172 LBS | HEIGHT: 65 IN

## 2022-03-10 DIAGNOSIS — I48.19 PERSISTENT ATRIAL FIBRILLATION (HCC): Primary | ICD-10-CM

## 2022-03-10 DIAGNOSIS — Z95.2 H/O MITRAL VALVE REPLACEMENT WITH MECHANICAL VALVE: ICD-10-CM

## 2022-03-10 DIAGNOSIS — I10 PRIMARY HYPERTENSION: ICD-10-CM

## 2022-03-10 DIAGNOSIS — E78.2 MIXED HYPERLIPIDEMIA: ICD-10-CM

## 2022-03-10 PROCEDURE — 93000 ELECTROCARDIOGRAM COMPLETE: CPT | Performed by: INTERNAL MEDICINE

## 2022-03-10 PROCEDURE — 99214 OFFICE O/P EST MOD 30 MIN: CPT | Performed by: INTERNAL MEDICINE

## 2022-03-10 NOTE — PROGRESS NOTES
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 with [facility] 23 mm in [place] in 2002, paroxysmal AFib status post cardioversion in December 2020 and July 2021 in [place], hyperlipidemia, anxiety, breast cancer presented for cardiology follow-up    Patient primary follows up with cardiologist in [place]  She was seen before 2 weeks in [place]  She was noted to have recurrent Afib  As per patient there is a plan for possible ablation  Patient is again following with primary cardiologist in next 1-2 weeks  Her amiodarone was decreased to 150 mg daily by primary cardiologist(as per patient she had some lung and thyroid issue)  There is no labs for me to review  Her blood pressure has been fine(During last clinic visit hydrochlorothiazide was added due to uncontrolled blood pressure)    She gets intermittent palpitation  Denies shortness of breath, anginal chest pain, leg edema, orthopnea loss of consciousness  He occasionally felt dizzy and palpitation which is not persistent  At baseline patient does not exert much due to right knee issues  Patient denies black stool or blood in stool    Current medications reviewed  No recent labs available for review  Patient follows up with primary cardiologist in [place] for INR check    (From last clinic visit:  Patient had Holter monitoring done in May 2021 which showed persistent atrial fibrillation     She was offered further management or atrial fibrillation but she opted to follow-up with primary cardiologist in [facility]  In July 2021 patient was having worsening palpitation and shortness of breath  She underwent successful NORMA guided electrical cardioversion with primary cardiologist in [place] in July 2021  She is feeling better since then  She was noted to have bradycardia post cardioversion and medications were adjusted )    Review of Systems: All review of system negative except as mentioned    Patient Active Problem List   Diagnosis    Atrial fibrillation ([facility] [address] )    Constipation    Compression fracture of lumbar spine, non-traumatic, with routine healing, subsequent encounter    History of breast cancer    Other specified hypothyroidism    Chronic midline low back pain with right-sided sciatica    Anxiety    Primary osteoarthritis of both knees    Chronic pain syndrome    Lumbar spondylosis    History of mitral valve replacement    Pancreatic abnormality    Diverticulosis    Abdominal distention    Hepatic cyst    Renal cyst    Chronic pain of right knee    Primary osteoarthritis of right knee    Atrophic vaginitis    OAB (overactive bladder)    Microscopic hematuria     Past Medical History:   Diagnosis Date    Acute blood loss anemia 6/5/2020    Arm contusion, right 2/2 Coumadin toxicity 6/4/2020    Arm swelling 6/4/2020    Atrial fibrillation ([facility] [address] )     Bilateral impacted cerumen 6/18/2020    Breast cancer ([facility] [address] ) 1989    left    Diverticulitis-recent 6/5/2020    History of chemotherapy 1989    left breast    Irregular heart beat     Medicare annual wellness visit, subsequent 6/18/2020    Tick bite with subsequent removal of tick 5/28/2019    Last Assessment & Plan:  On doxy so no need for lymes testing or additional antibiotic     Social History     Socioeconomic History    Marital status:       Spouse name: Not on file    Number of children: Not on file    Years of education: Not on file    Highest education level: Not on file   Occupational History    Not on file Tobacco Use    Smoking status: Never Smoker    Smokeless tobacco: Never Used   Vaping Use    Vaping Use: Never used   Substance and Sexual Activity    Alcohol use: Yes     Comment: occasionally    Drug use: Never    Sexual activity: Not on file   Other Topics Concern    Not on file   Social History Narrative    Lives with partner -- splits time between here and [facility]      Social Determinants of Health     Financial Resource Strain: Not on file   Food Insecurity: Not on file   Transportation Needs: Not on file   Physical Activity: Not on file   Stress: Not on file   Social Connections: Not on file   Intimate Partner Violence: Not on file   Housing Stability: Not on file      Family History   Problem Relation Age of Onset    Diabetes Mother     Cancer Mother     Breast cancer Mother 40    Colon cancer Father     Colon cancer Paternal Grandmother     Colon cancer Paternal Grandfather     No Known Problems Daughter     No Known Problems Daughter     Breast cancer Maternal Aunt 39    Breast cancer Maternal Aunt         age unknown    Breast cancer Maternal Aunt         age unknown    Breast cancer Maternal Aunt         age unknown    No Known Problems Paternal Aunt      Past Surgical History:   Procedure Laterality Date    APPENDECTOMY      AUGMENTATION MAMMAPLASTY Right 1989    implant    CARDIAC ELECTROPHYSIOLOGY STUDY, ESOPHAGEAL      CARDIAC VALVE REPLACEMENT      MV    COLONOSCOPY      HYSTERECTOMY  2002    MASTECTOMY Left 1989    OOPHORECTOMY Bilateral 2002    TONSILLECTOMY         Current Outpatient Medications:     acetaminophen (TYLENOL) 325 mg tablet, Take 2 tablets (650 mg total) by mouth every 6 (six) hours as needed for mild pain, headaches or fever, Disp: 30 tablet, Rfl: 0    amiodarone 200 mg tablet, take 1 tablet by mouth daily (Patient taking differently: 300 mg Pt reported taking 1 5 tablet ), Disp: 90 tablet, Rfl: 3    diclofenac sodium (VOLTAREN) 1 %, Apply 2 g topically 4 (four) times a day, Disp: 1 Tube, Rfl: 2    estradiol (ESTRACE) 0 1 mg/g vaginal cream, Apply a pea-sized amount to the vaginal opening 3 times a week (Mon-Wed-Fri), Disp: 42 5 g, Rfl: 3    hydrochlorothiazide (HYDRODIURIL) 25 mg tablet, take 1 tablet by mouth once daily, Disp: 30 tablet, Rfl: 11    irbesartan (AVAPRO) 150 mg tablet, Take 1 tablet (150 mg total) by mouth daily at bedtime, Disp: 30 tablet, Rfl: 3    levothyroxine 88 mcg tablet, Take 88 mcg by mouth daily, Disp: , Rfl:     LORazepam (ATIVAN) 1 mg tablet, Take 1 mg by mouth daily at bedtime, Disp: , Rfl:     melatonin 1 mg, Take 3 mg by mouth daily at bedtime as needed , Disp: , Rfl:     metoprolol succinate (TOPROL-XL) 50 mg 24 hr tablet, 50 mg 2 (two) times a day , Disp: , Rfl:     pantoprazole (PROTONIX) 40 mg tablet, take 1 tablet by mouth twice a day, Disp: 60 tablet, Rfl: 2    Probiotic Product (CULTURELLE PROBIOTICS PO), Take by mouth  , Disp: , Rfl:     RA LAXATIVE powder, take 17GM (DISSOLVED IN WATER) by mouth once daily if needed for constipation, Disp: , Rfl:     traZODone (DESYREL) 50 mg tablet, Take 50 mg by mouth daily at bedtime, Disp: , Rfl:     venlafaxine (EFFEXOR) 75 mg tablet, Take 75 mg by mouth daily , Disp: , Rfl:     warfarin (COUMADIN) 2 5 mg tablet, Every Monday, Wednesday and Friday, Disp: , Rfl: 0    warfarin (COUMADIN) 5 mg tablet, Take 1 tablet (5 mg total) by mouth see administration instructions Four days in a week    Every  Tuesday, Thursday, Saturday and Sunday, Disp: 30 tablet, Rfl: 0    alendronate (FOSAMAX) 70 mg tablet, Take 1 tablet (70 mg total) by mouth every 7 days (Patient not taking: Reported on 3/10/2022 ), Disp: 12 tablet, Rfl: 3    calcium polycarbophil (FIBERCON) 625 mg tablet, Take 625 mg by mouth daily (Patient not taking: Reported on 9/2/2021), Disp: , Rfl:     enoxaparin (LOVENOX) 80 mg/0 8 mL, Prn procedure (Patient not taking: Reported on 3/10/2022), Disp: , Rfl:    famotidine (PEPCID) 20 mg tablet, take 1 tablet by mouth twice a day (Patient not taking: Reported on 9/2/2021), Disp: 60 tablet, Rfl: 1    gabapentin (NEURONTIN) 300 mg capsule, Take 2 capsules (600 mg total) by mouth 2 (two) times a day (Patient not taking: Reported on 3/10/2022 ), Disp: 120 capsule, Rfl: 0    levothyroxine 25 mcg tablet, Take 25 mcg by mouth daily   (Patient not taking: Reported on 3/10/2022 ), Disp: , Rfl:     levothyroxine 75 mcg tablet, TK 1 T PO QAM OES FOR 14 DAYS (Patient not taking: Reported on 3/10/2022), Disp: , Rfl:   Allergies   Allergen Reactions    Penicillins Anaphylaxis     Other reaction(s): Anaphylaxis      Iodides      Other reaction(s): Rash         Labs:not applicable  No recent labs available for review  Imaging: No results found      Physical Exam:  General:  Overweight, awake, alert and oriented x3, not in distress  Neck: supple, no JVD  Eyes: PERRL, conjunctiva normal  Lungs:  Bilateral air entry positive, no wheeze/rhonchi or crackle  Heart:  Opening and closing of mechanical mitral valve heard, no significant murmur  Abdomen:  Soft ,nondistended ,nontender, bowel sounds positive  Extremities:  No leg edema, no deformity, ROM normal  Neuro:  Moving all extremities, speech clear  Skin: warm, no rash     /76 (BP Location: Left arm, Patient Position: Sitting, Cuff Size: Standard)   Pulse 71   Resp 18   Ht 5' 5" (1 651 m)   Wt 78 kg (172 lb)   SpO2 98%   BMI 28 62 kg/m²     Cardiographics :  EKG today showed AFib with ventricular rate 85, nonspecific ST changes, voltage criteria for LVH     ECG from September 2021:  Sinus rhythm, normal axis, moderate voltage criteria for LVH      transesophageal echocardiogram in July 2021 showed preserved LVEF, normally functioning mitral wall, mild-to-moderate AI, mild MR     EKG on last clinic visit sinus rhythm, normal axis  Echocardiogram in August 2020 showed EF 65%, no regional wall motion abnormality, mild concentric LVH, moderate to marked mitral annular calcification,  mitral prosthesis with normal function, trace AI, trace to mild TR, trace PI      Assessment:    1  Persistent Recurrent atrial fibrillation  Status post successful NORMA guided electrical cardioversion in July 2021 in [facility](had previous cardioversion in November 2020)  Currently in Afib  Ventricular rate is controlled  Patient is actively being followed by primary cardiologist in [place] and has again appointment in next week  Currently in atrial fibrillation    2  Rheumatic mitral stenosis status post metallic mitral valve replaced in 2002 on Coumadin  Patient follows up with cardiologist in [place] for INR check  As per patient last INR was in therapeutic range    3  Mild-to-moderate AI  4  Hypertension  5  Hyperlipidemia  6  Right knee pain  7  Left breast cancer status post chemo/mastectomy in 1989  8  History of right renal cyst    Recommendations:    Patient to continue Toprol-XL 50 mg twice a day, irbesartan 150 mg, hydrochlorothiazide 25 mg daily  Her amiodarone was decreased by primary cardiologist to 150 mg daily  Continue Coumadin  Patient is aware of long-term side effects of amiodarone and will follow-up with primary cardiologist in [place]  (patient follows up with me as she has home in [place]   Patient primary follows recommendation from primary cardiologist in [place])    Patient advised to avoid any heavy exertions or heavy lifting until he has follow-up with primary cardiologist next week  Advised to take low-salt, low-fat/low-cholesterol diet and try to lose weight    Patient was educated about cardiac symptoms to watch out for and call 911 or go to nearest ED as needed  Patient to follow-up with primary cardiologist as per schedule  Return to clinic in 6 months or early as needed  Above all discussed with patient    Patient understands and agrees

## 2022-04-11 ENCOUNTER — TELEPHONE (OUTPATIENT)
Dept: FAMILY MEDICINE CLINIC | Facility: CLINIC | Age: 77
End: 2022-04-11

## 2022-05-25 ENCOUNTER — TELEPHONE (OUTPATIENT)
Dept: PSYCHIATRY | Facility: CLINIC | Age: 77
End: 2022-05-25

## 2022-05-25 DIAGNOSIS — F41.9 ANXIETY: Primary | ICD-10-CM

## 2022-05-26 ENCOUNTER — TELEPHONE (OUTPATIENT)
Dept: PSYCHIATRY | Facility: CLINIC | Age: 77
End: 2022-05-26

## 2022-06-05 DIAGNOSIS — R10.13 EPIGASTRIC PAIN: ICD-10-CM

## 2022-06-05 RX ORDER — PANTOPRAZOLE SODIUM 40 MG/1
TABLET, DELAYED RELEASE ORAL
Qty: 60 TABLET | Refills: 2 | Status: SHIPPED | OUTPATIENT
Start: 2022-06-05

## 2022-06-09 ENCOUNTER — TELEPHONE (OUTPATIENT)
Dept: PSYCHIATRY | Facility: CLINIC | Age: 77
End: 2022-06-09

## 2022-06-16 ENCOUNTER — TELEPHONE (OUTPATIENT)
Dept: PSYCHIATRY | Facility: CLINIC | Age: 77
End: 2022-06-16

## 2022-09-01 DIAGNOSIS — I48.0 PAROXYSMAL ATRIAL FIBRILLATION (HCC): ICD-10-CM

## 2022-09-07 RX ORDER — AMIODARONE HYDROCHLORIDE 200 MG/1
300 TABLET ORAL DAILY
Qty: 90 TABLET | Refills: 5 | Status: SHIPPED | OUTPATIENT
Start: 2022-09-07

## 2022-09-29 ENCOUNTER — TELEPHONE (OUTPATIENT)
Dept: FAMILY MEDICINE CLINIC | Facility: CLINIC | Age: 77
End: 2022-09-29

## 2022-10-06 DIAGNOSIS — R10.13 EPIGASTRIC PAIN: ICD-10-CM

## 2022-10-07 RX ORDER — PANTOPRAZOLE SODIUM 40 MG/1
40 TABLET, DELAYED RELEASE ORAL 2 TIMES DAILY
Qty: 60 TABLET | Refills: 1 | Status: SHIPPED | OUTPATIENT
Start: 2022-10-07

## 2022-10-07 RX ORDER — PANTOPRAZOLE SODIUM 40 MG/1
TABLET, DELAYED RELEASE ORAL
Qty: 60 TABLET | Refills: 0 | OUTPATIENT
Start: 2022-10-07

## 2022-12-12 DIAGNOSIS — R10.13 EPIGASTRIC PAIN: ICD-10-CM

## 2022-12-13 ENCOUNTER — TELEPHONE (OUTPATIENT)
Dept: GASTROENTEROLOGY | Facility: CLINIC | Age: 77
End: 2022-12-13

## 2022-12-13 RX ORDER — PANTOPRAZOLE SODIUM 40 MG/1
TABLET, DELAYED RELEASE ORAL
Qty: 60 TABLET | Refills: 1 | OUTPATIENT
Start: 2022-12-13

## 2022-12-13 NOTE — TELEPHONE ENCOUNTER
Spoke to patient    She will phone back to schedule an office appt with either Dr Mundo Anne or Romayne Pesa for fup and medication review  Singh Carlin

## 2022-12-14 NOTE — TELEPHONE ENCOUNTER
Called patient lmom for patient to call the office to schedule follow up for medication refill of pantoprazole Sodium 40 mg 1 tablet daily  Will not refill prescription until patient is seen in office  Dr Melissa Vasquez recommends OTC Nexium until then

## 2022-12-14 NOTE — TELEPHONE ENCOUNTER
Called patient lmom for patient to call the office to schedule office visit for medication refill of pantoprazole Sodium 40 mg 1 tablet daily

## 2023-01-31 NOTE — PROGRESS NOTES
Assessment and Plan:   Ms Randy Cao a 60-year-old female with history significant for postmenopausal osteoporosis with resultant multiple spinal compression fractures who presents for a follow-up  She is currently on oral alendronate 70 mg once weekly  # Postmenopausal osteoporosis  - Celestino Villar presents today for a follow-up of osteoporosis with a history of spinal compression fractures  Reassuringly she denies any history of fractures since our last office visit but had been taking the alendronate incorrectly and instead of weekly was taking this on a monthly basis  I request that she restart the alendronate and counseled her on taking this once a week on an empty stomach first thing in the morning and remaining upright for at least 30 to 45 minutes  She will continue with the vitamin D skin patch and try to obtain calcium from her diet  Given the history of gastroparesis I requested she make me aware if she has any gastric side effects to the alendronate in which case I will switch her to IV bisphosphonates  She will try to perform weightbearing exercises  I reprinted the prescription for the DEXA scan and requested she update this  # Primary generalized osteoarthritis   - She may continue the Tylenol as needed and also the topical diclofenac gel  - I requested she follow up with Orthopedics and Pain Management for continued monitoring of the right knee advanced osteoarthritis  She is requesting an updated x-ray        Plan:  Diagnoses and all orders for this visit:    Age-related osteoporosis without current pathological fracture  -     alendronate (FOSAMAX) 70 mg tablet; Take 1 tablet (70 mg total) by mouth every 7 days  -     DXA bone density spine hip and pelvis; Future    Long term use of bisphosphonates  -     Basic metabolic panel; Future    Primary generalized (osteo)arthritis    Chronic pain of right knee  -     XR knee 3 vw right non injury;  Future    Gastroparesis    History of breast cancer    Other orders  -     digoxin (LANOXIN) 0 125 mg tablet; Take 125 mcg by mouth daily  -     rosuvastatin (CRESTOR) 5 mg tablet; take 1 tablet (5 mg) by oral route once daily Oral 1      Activities as tolerated  Exercise: try to maintain a low impact exercise regimen as much as possible  Walk for 30 minutes a day for at least 3 days a week  Continue other medications as prescribed by PCP and other specialists  RTC in 1 year         HPI    INITIAL VISIT NOTE (10/2020):  Ms Ellen Balderas a 56-year-old female with history significant for possible rheumatoid arthritis and postmenopausal osteoporosis with resultant multiple spinal compression fractures, who presents for further evaluation of these conditions   She is referred by Dr Lexus Dueñas for a rheumatology consult      Patient reports she was seen by a rheumatologist approximately 1 and half years ago in [place] and was informed that she has positive rheumatoid serologies but there was uncertainty in regards to a diagnosis of rheumatoid arthritis   She was never started on DMARDs and her symptoms were managed with short courses of oral prednisone  Tameka Nikia does not have any prior records available for today's appointment  Zeinab Cohen regards to her joint pains she states that this has been going on for approximately 2 years now and has primarily affected the base of her left thumb and the middle finger on her right hand   She does not experience joint pains throughout the rest of her fingers   She also describes an achiness in her bilateral shoulders   She has chronic pain of her right hip and right knee where she is known to have osteoarthritis and is currently following with Orthopedics  Tameka Nikia did receive a right knee intra-articular cortisone injection which was ineffective and has started viscosupplementation   She denies any significant pain of her wrists, elbows, left hip, left knee, ankles or feet   She has noticed swelling only affecting her right knee  Tameka Nikia experiences morning stiffness which mostly affects her right knee, right hip and bilateral shoulders, and takes a few hours to improve   She will take Tylenol on an as-needed basis which does help her but she tries to avoid doing this on a daily basis   She avoids NSAIDs as she is on chronic anticoagulation      In regards to the osteoporosis she mentions only being informed of this recently when she established with Pain Management for treatment of the spinal compression fractures   She denies any other history of fragility fractures   She attained menopause in her early 45s due to chemotherapy that was utilized for breast cancer  Ismael Shelton does take daily calcium and vitamin-D supplements but does not perform any weight-bearing exercises  Rejigonzalez Shelton is unsure of a family history of osteoporosis   She has not been on long-term chronic steroids      She denies fevers, unintentional weight loss, inflammatory eye disease, chronic skin rashes, psoriasis, inflammatory bowel disease or family history of autoimmune disease  Ismael Shelton states her grandmother may have had osteoarthritis      Of note x-rays of her bilateral knees done in July 2020 showed osteoarthritis   An x-ray of her lumbar spine done in August 2020 showed severe compression fracture of L1 as well as compression of the superior endplate of L3         5/20/2322:  Patient presents for a follow-up today  [facility] reviewed the labs which showed a normal rheumatoid factor, anti CCP antibody, ESR, CRP, Sjogren's antibodies, SPEP, vitamin-D, hepatitis panel and PTH   The x-rays of her hands, feet and left shoulder were normal   She also had an MRI of her right knee done which showed moderate to severe osteoarthritis and large complex tears of the medial and lateral menisci   We also reviewed the DEXA scan which showed osteopenia with a T-score of -2 3 of the left hip along with a FRAX score of 4% and 14% for a 10 year risk of hip and major osteoporotic fracture, respectively      She has been following with Orthopedics for the right knee osteoarthritis and received an intra-articular cortisone injection on 11/05/2020 which did not help   I advised her since she had the injection recently I would not be able to repeat this today   She was also referred to Dr Jil Hugo for consideration of a nerve block as she may not be a surgical candidate due to her medical comorbidities  Esther Medeiros does manage her symptoms with 2 tablets of Tylenol twice daily         2/1/2022:  Patient presents for a follow-up of osteoporosis  She is currently on oral alendronate 70 mg once weekly  She denies any interim fractures  She has not been consistent with taking the daily calcium and vitamin-D supplements but does try to consume dairy products  She performs weight-bearing exercises  Additionally she reports neck pain that has been radiating down her back  No other acute complaints at this time  2/1/2023:  Patient presents for a follow-up of osteoporosis  She had been prescribed oral alendronate 70 mg once weekly but reports over the past year she had actually been taking this on a monthly basis  She has also been out for the past month  She has been using vitamin D supplement skin patch but has not been taking any calcium supplements  She reports that she has mild gastroparesis and this sometimes causes issues with taking pills and with her diet  She does try to consume dairy products  She has not had any stomach issues with taking the alendronate  She does not perform any weightbearing exercises  No interim fractures  She did not schedule the DEXA scan  She is also requesting a right knee x-ray due to ongoing pain      The following portions of the patient's history were reviewed and updated as appropriate: allergies, current medications, past family history, past medical history, past social history, past surgical history and problem list       Review of Systems  Constitutional: Negative for weight change, fevers, chills, night sweats, fatigue  ENT/Mouth: Negative for hearing changes, ear pain, nasal congestion, sinus pain, hoarseness, sore throat, rhinorrhea, swallowing difficulty  Eyes: Negative for pain, redness, discharge, vision changes  Cardiovascular: Negative for chest pain, SOB, palpitations  Respiratory: Negative for cough, sputum, wheezing, dyspnea  Genitourinary: Negative for dysuria, urinary frequency, hematuria  Musculoskeletal: As per HPI  Skin: Negative for skin rash, color changes  Neuro: Negative for weakness, numbness, tingling, loss of consciousness  Psych: Negative for anxiety, depression  Heme/Lymph: Negative for easy bruising, bleeding, lymphadenopathy  Past Medical History:   Diagnosis Date   • Acute blood loss anemia 6/5/2020   • Arm contusion, right 2/2 Coumadin toxicity 6/4/2020   • Arm swelling 6/4/2020   • Atrial fibrillation ([facility] [address] )    • Bilateral impacted cerumen 6/18/2020   • Breast cancer ([facility] [address] ) 1989    left   • Diverticulitis-recent 6/5/2020   • History of chemotherapy 1989    left breast   • Irregular heart beat    • Medicare annual wellness visit, subsequent 6/18/2020   • Tick bite with subsequent removal of tick 5/28/2019    Last Assessment & Plan:  On doxy so no need for lymes testing or additional antibiotic       Past Surgical History:   Procedure Laterality Date   • APPENDECTOMY     • AUGMENTATION MAMMAPLASTY Right 1989    implant   • CARDIAC ELECTROPHYSIOLOGY STUDY, ESOPHAGEAL     • CARDIAC VALVE REPLACEMENT      MV   • COLONOSCOPY     • HYSTERECTOMY  2002   • MASTECTOMY Left 1989   • OOPHORECTOMY Bilateral 2002   • TONSILLECTOMY         Social History     Socioeconomic History   • Marital status:       Spouse name: Not on file   • Number of children: Not on file   • Years of education: Not on file   • Highest education level: Not on file   Occupational History   • Not on file   Tobacco Use   • Smoking status: Never   • Smokeless tobacco: Never   Vaping Use   • Vaping Use: Never used   Substance and Sexual Activity   • Alcohol use: Yes     Comment: occasionally   • Drug use: Never   • Sexual activity: Not on file   Other Topics Concern   • Not on file   Social History Narrative    Lives with partner -- splits time between here and [facility]      Social Determinants of Health     Financial Resource Strain: Not on file   Food Insecurity: Not on file   Transportation Needs: Not on file   Physical Activity: Not on file   Stress: Not on file   Social Connections: Not on file   Intimate Partner Violence: Not on file   Housing Stability: Not on file       Family History   Problem Relation Age of Onset   • Diabetes Mother    • Cancer Mother    • Breast cancer Mother 40   • Colon cancer Father    • Colon cancer Paternal Grandmother    • Colon cancer Paternal Grandfather    • No Known Problems Daughter    • No Known Problems Daughter    • Breast cancer Maternal Aunt 39   • Breast cancer Maternal Aunt         age unknown   • Breast cancer Maternal Aunt         age unknown   • Breast cancer Maternal Aunt         age unknown   • No Known Problems Paternal Aunt        Allergies   Allergen Reactions   • Penicillins Anaphylaxis and Other (See Comments)     Other reaction(s):  Anaphylaxis     • Iodides      Other reaction(s): Rash     • Amiodarone Other (See Comments)   • Clindamycin Other (See Comments)   • Iodinated Contrast Media Other (See Comments)   • Morphine Rash       Current Outpatient Medications:   •  alendronate (FOSAMAX) 70 mg tablet, Take 1 tablet (70 mg total) by mouth every 7 days, Disp: 12 tablet, Rfl: 3  •  acetaminophen (TYLENOL) 325 mg tablet, Take 2 tablets (650 mg total) by mouth every 6 (six) hours as needed for mild pain, headaches or fever, Disp: 30 tablet, Rfl: 0  •  amiodarone 200 mg tablet, Take 1 5 tablets (300 mg total) by mouth daily Pt reported taking 1 5 tablet, Disp: 90 tablet, Rfl: 5  •  calcium polycarbophil (FIBERCON) 625 mg tablet, Take 625 mg by mouth daily (Patient not taking: Reported on 9/2/2021), Disp: , Rfl:   •  diclofenac sodium (VOLTAREN) 1 %, Apply 2 g topically 4 (four) times a day, Disp: 1 Tube, Rfl: 2  •  digoxin (LANOXIN) 0 125 mg tablet, Take 125 mcg by mouth daily, Disp: , Rfl:   •  enoxaparin (LOVENOX) 80 mg/0 8 mL, Prn procedure (Patient not taking: Reported on 3/10/2022), Disp: , Rfl:   •  estradiol (ESTRACE) 0 1 mg/g vaginal cream, Apply a pea-sized amount to the vaginal opening 3 times a week (Mon-Wed-Fri), Disp: 42 5 g, Rfl: 3  •  famotidine (PEPCID) 20 mg tablet, take 1 tablet by mouth twice a day (Patient not taking: Reported on 9/2/2021), Disp: 60 tablet, Rfl: 1  •  gabapentin (NEURONTIN) 300 mg capsule, Take 2 capsules (600 mg total) by mouth 2 (two) times a day (Patient not taking: Reported on 3/10/2022 ), Disp: 120 capsule, Rfl: 0  •  hydrochlorothiazide (HYDRODIURIL) 25 mg tablet, take 1 tablet by mouth once daily, Disp: 30 tablet, Rfl: 11  •  irbesartan (AVAPRO) 150 mg tablet, Take 1 tablet (150 mg total) by mouth daily at bedtime, Disp: 30 tablet, Rfl: 3  •  levothyroxine 25 mcg tablet, Take 25 mcg by mouth daily   (Patient not taking: Reported on 3/10/2022 ), Disp: , Rfl:   •  levothyroxine 75 mcg tablet, TK 1 T PO QAM OES FOR 14 DAYS (Patient not taking: Reported on 3/10/2022), Disp: , Rfl:   •  levothyroxine 88 mcg tablet, Take 88 mcg by mouth daily, Disp: , Rfl:   •  LORazepam (ATIVAN) 1 mg tablet, Take 1 mg by mouth daily at bedtime, Disp: , Rfl:   •  melatonin 1 mg, Take 3 mg by mouth daily at bedtime as needed , Disp: , Rfl:   •  metoprolol succinate (TOPROL-XL) 50 mg 24 hr tablet, 50 mg 2 (two) times a day , Disp: , Rfl:   •  pantoprazole (PROTONIX) 40 mg tablet, Take 1 tablet (40 mg total) by mouth 2 (two) times a day, Disp: 60 tablet, Rfl: 1  •  Probiotic Product (CULTURELLE PROBIOTICS PO), Take by mouth  , Disp: , Rfl:   •  RA LAXATIVE powder, take 17GM (DISSOLVED IN WATER) by mouth once daily if needed for constipation, Disp: , Rfl:   •  rosuvastatin (CRESTOR) 5 mg tablet, take 1 tablet (5 mg) by oral route once daily Oral 1, Disp: , Rfl:   •  traZODone (DESYREL) 50 mg tablet, Take 50 mg by mouth daily at bedtime, Disp: , Rfl:   •  venlafaxine (EFFEXOR) 75 mg tablet, Take 75 mg by mouth daily , Disp: , Rfl:   •  warfarin (COUMADIN) 2 5 mg tablet, Every Monday, Wednesday and Friday, Disp: , Rfl: 0  •  warfarin (COUMADIN) 5 mg tablet, Take 1 tablet (5 mg total) by mouth see administration instructions Four days in a week  Every  Tuesday, Thursday, Saturday and Sunday, Disp: 30 tablet, Rfl: 0      Objective:    Vitals:    02/01/23 1159   BP: 118/78   Pulse: 62   Weight: 72 6 kg (160 lb)       Physical Exam  General: Well appearing, well nourished, in no distress  Oriented x 3, normal mood and affect  Ambulating without difficulty  Skin: Good turgor, no rash, unusual bruising or prominent lesions  Hair: Normal texture and distribution  Nails: Normal color, no deformities  HEENT:  Head: Normocephalic, atraumatic  Eyes: Conjunctiva clear, sclera non-icteric, EOM intact  Extremities: No amputations or deformities, cyanosis, edema  Musculoskeletal:   Hands - osteoarthritic changes noted  Neurologic: Alert and oriented  No focal neurological deficits appreciated  Psychiatric: Normal mood and affect  MALKA Damon    Rheumatology

## 2023-02-01 ENCOUNTER — OFFICE VISIT (OUTPATIENT)
Dept: RHEUMATOLOGY | Facility: CLINIC | Age: 78
End: 2023-02-01

## 2023-02-01 VITALS
DIASTOLIC BLOOD PRESSURE: 78 MMHG | BODY MASS INDEX: 26.63 KG/M2 | SYSTOLIC BLOOD PRESSURE: 118 MMHG | WEIGHT: 160 LBS | HEART RATE: 62 BPM

## 2023-02-01 DIAGNOSIS — M15.0 PRIMARY GENERALIZED (OSTEO)ARTHRITIS: ICD-10-CM

## 2023-02-01 DIAGNOSIS — M25.561 CHRONIC PAIN OF RIGHT KNEE: ICD-10-CM

## 2023-02-01 DIAGNOSIS — Z79.83 LONG TERM USE OF BISPHOSPHONATES: ICD-10-CM

## 2023-02-01 DIAGNOSIS — Z85.3 HISTORY OF BREAST CANCER: ICD-10-CM

## 2023-02-01 DIAGNOSIS — K31.84 GASTROPARESIS: ICD-10-CM

## 2023-02-01 DIAGNOSIS — M81.0 AGE-RELATED OSTEOPOROSIS WITHOUT CURRENT PATHOLOGICAL FRACTURE: Primary | ICD-10-CM

## 2023-02-01 DIAGNOSIS — G89.29 CHRONIC PAIN OF RIGHT KNEE: ICD-10-CM

## 2023-02-01 RX ORDER — DIGOXIN 125 MCG
125 TABLET ORAL DAILY
COMMUNITY
Start: 2022-12-27

## 2023-02-01 RX ORDER — ALENDRONATE SODIUM 70 MG/1
70 TABLET ORAL
Qty: 12 TABLET | Refills: 3 | Status: SHIPPED | OUTPATIENT
Start: 2023-02-01

## 2023-02-01 RX ORDER — ROSUVASTATIN CALCIUM 5 MG/1
TABLET, COATED ORAL
COMMUNITY

## 2023-02-03 DIAGNOSIS — R10.13 EPIGASTRIC PAIN: ICD-10-CM

## 2023-02-03 RX ORDER — PANTOPRAZOLE SODIUM 40 MG/1
TABLET, DELAYED RELEASE ORAL
Qty: 60 TABLET | Refills: 1 | Status: SHIPPED | OUTPATIENT
Start: 2023-02-03

## 2023-02-08 ENCOUNTER — HOSPITAL ENCOUNTER (OUTPATIENT)
Dept: BONE DENSITY | Facility: CLINIC | Age: 78
Discharge: HOME | End: 2023-02-08

## 2023-02-08 ENCOUNTER — TELEPHONE (OUTPATIENT)
Dept: RHEUMATOLOGY | Facility: CLINIC | Age: 78
End: 2023-02-08

## 2023-02-08 VITALS — HEIGHT: 62 IN | BODY MASS INDEX: 29.03 KG/M2

## 2023-02-08 DIAGNOSIS — M81.0 AGE-RELATED OSTEOPOROSIS WITHOUT CURRENT PATHOLOGICAL FRACTURE: ICD-10-CM

## 2023-02-08 NOTE — TELEPHONE ENCOUNTER
----- Message from Kalyan Khoury MD sent at 2/8/2023 12:36 PM EST -----  Please let her know the DEXA scan is stable which is good news  I will see her next year

## 2023-02-09 ENCOUNTER — OFFICE VISIT (OUTPATIENT)
Dept: FAMILY MEDICINE CLINIC | Facility: CLINIC | Age: 78
End: 2023-02-09

## 2023-02-09 VITALS
OXYGEN SATURATION: 98 % | WEIGHT: 160 LBS | DIASTOLIC BLOOD PRESSURE: 80 MMHG | TEMPERATURE: 97.5 F | HEART RATE: 110 BPM | SYSTOLIC BLOOD PRESSURE: 140 MMHG | BODY MASS INDEX: 29.44 KG/M2 | HEIGHT: 62 IN

## 2023-02-09 DIAGNOSIS — H61.23 IMPACTED CERUMEN OF BOTH EARS: Primary | ICD-10-CM

## 2023-02-09 NOTE — PROGRESS NOTES
Name: Ghada Nagy      : 1945      MRN: [id number]  Encounter Provider: Ellen Smith PA-C  Encounter Date: 2023   Encounter department: [address] [place] [address]  Impacted cerumen of both ears  -     Ear cerumen removal         Subjective      Pt present with difficulty hearing  She does wear hearing aids  Believes she has ear wax built up  Denies any other symptoms    Review of Systems   Constitutional: Negative for chills, fatigue and fever  HENT: Positive for hearing loss  Negative for congestion, ear pain, sinus pain, sore throat and trouble swallowing  Eyes: Negative for pain, discharge and redness  Respiratory: Negative for cough, chest tightness, shortness of breath and wheezing  Cardiovascular: Negative for chest pain, palpitations and leg swelling  Gastrointestinal: Negative for abdominal pain, diarrhea, nausea and vomiting  Musculoskeletal: Negative for arthralgias, joint swelling and myalgias  Skin: Negative for rash  Neurological: Negative for dizziness, weakness, numbness and headaches         Current Outpatient Medications on File Prior to Visit   Medication Sig   • acetaminophen (TYLENOL) 325 mg tablet Take 2 tablets (650 mg total) by mouth every 6 (six) hours as needed for mild pain, headaches or fever   • alendronate (FOSAMAX) 70 mg tablet Take 1 tablet (70 mg total) by mouth every 7 days   • diclofenac sodium (VOLTAREN) 1 % Apply 2 g topically 4 (four) times a day   • digoxin (LANOXIN) 0 125 mg tablet Take 125 mcg by mouth daily   • estradiol (ESTRACE) 0 1 mg/g vaginal cream Apply a pea-sized amount to the vaginal opening 3 times a week (Mon-Wed-Fri)   • famotidine (PEPCID) 20 mg tablet take 1 tablet by mouth twice a day (Patient not taking: Reported on 2021)   • hydrochlorothiazide (HYDRODIURIL) 25 mg tablet take 1 tablet by mouth once daily   • irbesartan (AVAPRO) 150 mg tablet Take 1 tablet (150 mg total) by mouth daily at bedtime   • levothyroxine 88 mcg tablet Take 88 mcg by mouth daily   • LORazepam (ATIVAN) 1 mg tablet Take 1 mg by mouth daily at bedtime   • melatonin 1 mg Take 3 mg by mouth daily at bedtime as needed    • metoprolol succinate (TOPROL-XL) 50 mg 24 hr tablet 50 mg 2 (two) times a day    • pantoprazole (PROTONIX) 40 mg tablet take 1 tablet by mouth twice a day   • Probiotic Product (CULTURELLE PROBIOTICS PO) Take by mouth     • RA LAXATIVE powder take 17GM (DISSOLVED IN WATER) by mouth once daily if needed for constipation   • rosuvastatin (CRESTOR) 5 mg tablet take 1 tablet (5 mg) by oral route once daily Oral 1   • traZODone (DESYREL) 50 mg tablet Take 50 mg by mouth daily at bedtime   • venlafaxine (EFFEXOR) 75 mg tablet Take 75 mg by mouth daily    • warfarin (COUMADIN) 2 5 mg tablet Every Monday, Wednesday and Friday   • warfarin (COUMADIN) 5 mg tablet Take 1 tablet (5 mg total) by mouth see administration instructions Four days in a week    Every  Tuesday, Thursday, Saturday and Sunday   • [DISCONTINUED] amiodarone 200 mg tablet Take 1 5 tablets (300 mg total) by mouth daily Pt reported taking 1 5 tablet (Patient not taking: Reported on 2/9/2023)   • [DISCONTINUED] calcium polycarbophil (FIBERCON) 625 mg tablet Take 625 mg by mouth daily (Patient not taking: Reported on 9/2/2021)   • [DISCONTINUED] enoxaparin (LOVENOX) 80 mg/0 8 mL Prn procedure (Patient not taking: Reported on 3/10/2022)   • [DISCONTINUED] gabapentin (NEURONTIN) 300 mg capsule Take 2 capsules (600 mg total) by mouth 2 (two) times a day (Patient not taking: Reported on 3/10/2022 )   • [DISCONTINUED] levothyroxine 25 mcg tablet Take 25 mcg by mouth daily   (Patient not taking: Reported on 3/10/2022 )   • [DISCONTINUED] levothyroxine 75 mcg tablet TK 1 T PO QAM OES FOR 14 DAYS (Patient not taking: Reported on 3/10/2022)       Objective     /80 (BP Location: Left arm, Patient Position: Sitting, Cuff Size: Standard)   Pulse (!) 110   Temp 97 5 °F (36 4 °C)   Ht 5' 2 25" (1 581 m)   Wt 72 6 kg (160 lb)   SpO2 98%   BMI 29 03 kg/m²     Physical Exam  Constitutional:       General: She is not in acute distress  HENT:      Right Ear: There is impacted cerumen  Left Ear: There is impacted cerumen  Ear cerumen removal    Date/Time: 2/9/2023 9:47 AM  Performed by: Zoie Pearce PA-C  Authorized by: Zoie Pearce PA-C     Patient location:  Clinic  Procedure details:     Location:  L ear and R ear    Procedure type: irrigation only      Approach:  External  Post-procedure details:     Complication:  None    Hearing quality:  Improved    Patient tolerance of procedure:   Tolerated well, no immediate complications      La Johnson PA-C

## 2023-04-01 DIAGNOSIS — R10.13 EPIGASTRIC PAIN: ICD-10-CM

## 2023-04-02 RX ORDER — PANTOPRAZOLE SODIUM 40 MG/1
TABLET, DELAYED RELEASE ORAL
Qty: 60 TABLET | Refills: 1 | Status: SHIPPED | OUTPATIENT
Start: 2023-04-02

## 2023-07-07 DIAGNOSIS — R10.13 EPIGASTRIC PAIN: ICD-10-CM

## 2023-07-07 RX ORDER — PANTOPRAZOLE SODIUM 40 MG/1
TABLET, DELAYED RELEASE ORAL
Qty: 60 TABLET | Refills: 0 | Status: SHIPPED | OUTPATIENT
Start: 2023-07-07

## 2023-08-06 DIAGNOSIS — R10.13 EPIGASTRIC PAIN: ICD-10-CM

## 2023-08-07 RX ORDER — PANTOPRAZOLE SODIUM 40 MG/1
TABLET, DELAYED RELEASE ORAL
Qty: 60 TABLET | Refills: 0 | OUTPATIENT
Start: 2023-08-07

## 2023-08-17 ENCOUNTER — APPOINTMENT (OUTPATIENT)
Dept: RADIOLOGY | Facility: CLINIC | Age: 78
End: 2023-08-17
Payer: MEDICARE

## 2023-08-17 ENCOUNTER — OFFICE VISIT (OUTPATIENT)
Dept: OBGYN CLINIC | Facility: CLINIC | Age: 78
End: 2023-08-17
Payer: MEDICARE

## 2023-08-17 VITALS — HEIGHT: 62 IN | WEIGHT: 159 LBS | BODY MASS INDEX: 29.26 KG/M2

## 2023-08-17 DIAGNOSIS — M17.11 PRIMARY OSTEOARTHRITIS OF RIGHT KNEE: Primary | ICD-10-CM

## 2023-08-17 DIAGNOSIS — M25.461 EFFUSION OF RIGHT KNEE: ICD-10-CM

## 2023-08-17 DIAGNOSIS — G89.29 CHRONIC PAIN OF RIGHT KNEE: ICD-10-CM

## 2023-08-17 DIAGNOSIS — M17.0 PRIMARY OSTEOARTHRITIS OF BOTH KNEES: ICD-10-CM

## 2023-08-17 DIAGNOSIS — R10.13 EPIGASTRIC PAIN: ICD-10-CM

## 2023-08-17 DIAGNOSIS — M25.561 CHRONIC PAIN OF RIGHT KNEE: ICD-10-CM

## 2023-08-17 PROCEDURE — 20610 DRAIN/INJ JOINT/BURSA W/O US: CPT | Performed by: ORTHOPAEDIC SURGERY

## 2023-08-17 PROCEDURE — 73564 X-RAY EXAM KNEE 4 OR MORE: CPT

## 2023-08-17 PROCEDURE — 99214 OFFICE O/P EST MOD 30 MIN: CPT | Performed by: ORTHOPAEDIC SURGERY

## 2023-08-17 PROCEDURE — 73560 X-RAY EXAM OF KNEE 1 OR 2: CPT

## 2023-08-17 RX ORDER — METHYLPREDNISOLONE ACETATE 40 MG/ML
2 INJECTION, SUSPENSION INTRA-ARTICULAR; INTRALESIONAL; INTRAMUSCULAR; SOFT TISSUE
Status: COMPLETED | OUTPATIENT
Start: 2023-08-17 | End: 2023-08-17

## 2023-08-17 RX ORDER — LIDOCAINE HYDROCHLORIDE 10 MG/ML
2 INJECTION, SOLUTION INFILTRATION; PERINEURAL
Status: COMPLETED | OUTPATIENT
Start: 2023-08-17 | End: 2023-08-17

## 2023-08-17 RX ORDER — BUPIVACAINE HYDROCHLORIDE 2.5 MG/ML
2 INJECTION, SOLUTION INFILTRATION; PERINEURAL
Status: COMPLETED | OUTPATIENT
Start: 2023-08-17 | End: 2023-08-17

## 2023-08-17 RX ADMIN — METHYLPREDNISOLONE ACETATE 2 ML: 40 INJECTION, SUSPENSION INTRA-ARTICULAR; INTRALESIONAL; INTRAMUSCULAR; SOFT TISSUE at 09:30

## 2023-08-17 RX ADMIN — BUPIVACAINE HYDROCHLORIDE 2 ML: 2.5 INJECTION, SOLUTION INFILTRATION; PERINEURAL at 09:30

## 2023-08-17 RX ADMIN — LIDOCAINE HYDROCHLORIDE 2 ML: 10 INJECTION, SOLUTION INFILTRATION; PERINEURAL at 09:30

## 2023-08-17 NOTE — PROGRESS NOTES
Patient Name:  Adriane Crowley  MRN:  [id number]    [id number] [facility]     1. Primary osteoarthritis of right knee  -     XR knee 4+ vw right injury; Future; Expected date: 08/17/2023  -     XR knee 1 or 2 vw left; Future; Expected date: 08/17/2023  -     Large joint arthrocentesis: R knee    2. Chronic pain of right knee  -     XR knee 4+ vw right injury; Future; Expected date: 08/17/2023  -     XR knee 1 or 2 vw left; Future; Expected date: 08/17/2023    3. Effusion of right knee  -     XR knee 4+ vw right injury; Future; Expected date: 08/17/2023        Right knee osteoarthritis  · X-rays reviewed in office today with patient  · In depth conversation had with patient regarding nonoperative treatments including OTC topical and oral analgesics, outpatient PT, CSI vs visco injections. · Briefly discussed surgical intervention by means of total knee arthroplasty, risks of surgery, preoperative risk stratification from Cardiology secondary to Afib and chronic Warfarin administration. · Patient wished to move forward with continued nonoperative treatment at this time and accepted aspiration and CSI. Approximately 30 cc of synovial fluid aspirated and tolerated procedure well. · Continue OTC oral and topical analgesics as needed  · Follow up in office for consideration of visco injections vs discussion of surgical intervention     Chief Complaint     Right knee pain    History of the Present Illness     Adriane Crowley is a 69 y.o. female with Right knee pain ongoing for many years without known injury. Patient locates pain to the medial aspect of the knee with certain twisting motions and ambulating stairs. She administers OTC medication and applies topical medication without much relief. She has seen Dr Alessia Power in the past and was provided CSI and visco injection with 3-4 days of pain relief. Patient's  has noticed her knee causing more pain recently without injury or trauma.  Patient has afib and takes Warfarin prescribed by Cardiologist. Patient has also seen Dr Rigo Lindo in the past for ablations of the Right knee without pain relief. Review of Systems     Review of Systems   Constitutional: Negative for chills and fever. HENT: Negative for congestion. Respiratory: Negative for cough, chest tightness and shortness of breath. Cardiovascular: Negative for chest pain and palpitations. Gastrointestinal: Negative for abdominal pain. Endocrine: Negative for cold intolerance and heat intolerance. Neurological: Negative for syncope. Psychiatric/Behavioral: Negative for confusion. Physical Exam     Ht 5' 2.25" (1.581 m)   Wt 72.1 kg (159 lb)   BMI 28.85 kg/m²     RightKnee  Range of motion from 0 to 130 degrees with pain at terminal flexion. There is no crepitus with range of motion. There is small effusion. There is  tenderness over the medial and lateral joint lines. There is 5/5 quadriceps strength and preserved tone. The patient is able to perform a straight leg raise. positive  patellar grind test.  Anterior drawer tests is negative  negative Lachman Test.   Posterior drawer test is   negative   Varus stress testing reveals no pain or instability at 0 and 30 degrees   Valgus stress testing reveals no pain or instability at 0 and 30 degrees  The patient is neurovascular intact distally. Eyes:  Anicteric sclerae. Neck:  Supple. Lungs:  Normal respiratory effort. Cardiovascular:  Capillary refill is less than 2 seconds. Skin:  Intact without erythema. Neurologic:  Sensation grossly intact to light touch. Psychiatric:  Mood and affect are appropriate. Data Review     I have personally reviewed pertinent films in PACS, and my interpretation follows:    X-rays taken 08/17/2023 of Right knee independently reviewed and demonstrate moderate lateral and patellofemoral compartment degenerative changes with joint space narrowing. Mild medial compartment degenerative changes. No acute fracture or dislocation. Past Medical History:   Diagnosis Date   • Acute blood loss anemia 6/5/2020   • Arm contusion, right 2/2 Coumadin toxicity 6/4/2020   • Arm swelling 6/4/2020   • Atrial fibrillation ([address])    • Bilateral impacted cerumen 6/18/2020   • Breast cancer ([address]) 1989    left   • Diverticulitis-recent 6/5/2020   • History of chemotherapy 1989    left breast   • Irregular heart beat    • Medicare annual wellness visit, subsequent 6/18/2020   • Tick bite with subsequent removal of tick 5/28/2019    Last Assessment & Plan:  On doxy so no need for lymes testing or additional antibiotic       Past Surgical History:   Procedure Laterality Date   • APPENDECTOMY     • AUGMENTATION MAMMAPLASTY Right 1989    implant   • CARDIAC ELECTROPHYSIOLOGY STUDY, ESOPHAGEAL     • CARDIAC VALVE REPLACEMENT      MV   • COLONOSCOPY     • HYSTERECTOMY  2002   • MASTECTOMY Left 1989   • OOPHORECTOMY Bilateral 2002   • TONSILLECTOMY         Allergies   Allergen Reactions   • Penicillins Anaphylaxis and Other (See Comments)     Other reaction(s):  Anaphylaxis     • Iodides      Other reaction(s): Rash     • Amiodarone Other (See Comments)   • Clindamycin Other (See Comments)   • Iodinated Contrast Media Other (See Comments)   • Morphine Rash       Current Outpatient Medications on File Prior to Visit   Medication Sig Dispense Refill   • acetaminophen (TYLENOL) 325 mg tablet Take 2 tablets (650 mg total) by mouth every 6 (six) hours as needed for mild pain, headaches or fever (Patient taking differently: Take 1,000 mg by mouth daily ALSO 500MG PRN) 30 tablet 0   • alendronate (FOSAMAX) 70 mg tablet Take 1 tablet (70 mg total) by mouth every 7 days 12 tablet 3   • diclofenac sodium (VOLTAREN) 1 % Apply 2 g topically 4 (four) times a day (Patient taking differently: Apply 2 g topically 4 (four) times a day PRN) 1 Tube 2   • digoxin (LANOXIN) 0.125 mg tablet Take 125 mcg by mouth daily     • estradiol (ESTRACE) 0.1 mg/g vaginal cream Apply a pea-sized amount to the vaginal opening 3 times a week (Mon-Wed-Fri) 42.5 g 3   • hydrochlorothiazide (HYDRODIURIL) 25 mg tablet take 1 tablet by mouth once daily 30 tablet 11   • irbesartan (AVAPRO) 150 mg tablet Take 1 tablet (150 mg total) by mouth daily at bedtime 30 tablet 3   • levothyroxine 88 mcg tablet Take 88 mcg by mouth daily     • LORazepam (ATIVAN) 1 mg tablet Take 1 mg by mouth daily at bedtime     • melatonin 1 mg Take 20 mg by mouth daily at bedtime as needed     • metoprolol succinate (TOPROL-XL) 50 mg 24 hr tablet 50 mg 2 (two) times a day      • Multiple Vitamin (MULTIVITAMIN PO) Take by mouth in the morning PATCH     • pantoprazole (PROTONIX) 40 mg tablet take 1 tablet by mouth twice a day 60 tablet 0   • RA LAXATIVE powder take 17GM (DISSOLVED IN WATER) by mouth once daily if needed for constipation     • rosuvastatin (CRESTOR) 5 mg tablet take 1 tablet (5 mg) by oral route once daily Oral 1     • traZODone (DESYREL) 50 mg tablet Take 50 mg by mouth daily at bedtime     • venlafaxine (EFFEXOR) 75 mg tablet Take 75 mg by mouth daily      • warfarin (COUMADIN) 2.5 mg tablet Every Monday, Wednesday and Friday  0   • famotidine (PEPCID) 20 mg tablet take 1 tablet by mouth twice a day (Patient not taking: No sig reported) 60 tablet 1   • Probiotic Product (CULTURELLE PROBIOTICS PO) Take by mouth   (Patient not taking: Reported on 8/17/2023)       No current facility-administered medications on file prior to visit.        Social History     Tobacco Use   • Smoking status: Never   • Smokeless tobacco: Never   Vaping Use   • Vaping Use: Never used   Substance Use Topics   • Alcohol use: Yes     Comment: occasionally   • Drug use: Never       Family History   Problem Relation Age of Onset   • Diabetes Mother    • Cancer Mother    • Breast cancer Mother 40   • Colon cancer Father    • Colon cancer Paternal Grandmother    • Colon cancer Paternal Grandfather    • No Known Problems Daughter    • No Known Problems Daughter    • Breast cancer Maternal Aunt 39   • Breast cancer Maternal Aunt         age unknown   • Breast cancer Maternal Aunt         age unknown   • Breast cancer Maternal Aunt         age unknown   • No Known Problems Paternal Aunt              Procedures Performed     Large joint arthrocentesis: R knee  Universal Protocol:  Risks and benefits: risks, benefits and alternatives were discussed  Consent given by: patient  Patient identity confirmed: verbally with patient    Supporting Documentation  Indications: pain and joint swelling   Procedure Details  Location: knee - R knee  Needle size: 18 G  Approach: lateral  Medications administered: 2 mL bupivacaine 0.25 %; 2 mL lidocaine 1 %; 2 mL methylPREDNISolone acetate 40 mg/mL    Aspirate amount: 30 mL  Aspirate: clear and yellow    Patient tolerance: patient tolerated the procedure well with no immediate complications  Dressing:  Sterile dressing applied              Jeanie Corbett DO

## 2023-08-18 RX ORDER — PANTOPRAZOLE SODIUM 40 MG/1
TABLET, DELAYED RELEASE ORAL
Qty: 60 TABLET | Refills: 0 | Status: SHIPPED | OUTPATIENT
Start: 2023-08-18

## 2023-08-22 ENCOUNTER — OFFICE VISIT (OUTPATIENT)
Age: 78
End: 2023-08-22
Payer: MEDICARE

## 2023-08-22 VITALS
WEIGHT: 153 LBS | OXYGEN SATURATION: 98 % | DIASTOLIC BLOOD PRESSURE: 80 MMHG | HEART RATE: 89 BPM | BODY MASS INDEX: 28.16 KG/M2 | HEIGHT: 62 IN | SYSTOLIC BLOOD PRESSURE: 122 MMHG

## 2023-08-22 DIAGNOSIS — K31.84 GASTROPARESIS: ICD-10-CM

## 2023-08-22 DIAGNOSIS — R14.0 BLOATING: Primary | ICD-10-CM

## 2023-08-22 PROCEDURE — 99214 OFFICE O/P EST MOD 30 MIN: CPT | Performed by: INTERNAL MEDICINE

## 2023-08-22 NOTE — PROGRESS NOTES
[facility]      Chief Complaint: Bloating    HPI:  Ivory De Jesus is a 69 y.o.  female who presents with bloating. Patient has a history of epigastric discomfort and early satiety. EGD done last year was unremarkable with benign biopsies. Patient underwent a gastric emptying study which showed borderline gastric emptying abnormality. Most of her problem occurs with bloating. She can only eat small frequent meals and is following a gastroparesis diet. No nausea vomiting or chest pain. No shortness of breath. No melena or hematochezia. Patient has no other significant GI complaints at this time. .      Review of Systems:   Constitutional: No fever or chills, feels well, no tiredness, no recent weight gain or weight loss. HENT: No complaints of earache, no hearing loss, no nosebleeds, no nasal discharge, no sore throat, no hoarseness. Eyes: No complaints of eye pain, no red eyes, no discharge from eyes, no itchy eyes. Cardiovascular: No complaints of slow heart rate, no fast heart rate, no chest pain, no palpitations, no leg claudication, no lower extremity edema. Respiratory: No complaints of shortness of breath, no wheezing, no cough, rare SOB on exertion, no orthopnea. Gastrointestinal: As noted in HPI  Genitourinary: No complaints of dysuria, no incontinence, no hesitancy, no nocturia. Musculoskeletal: Positive diffuse joint pains  Neurological: No complaints of headache, no confusion, no convulsions, no numbness or tingling, occasional dizziness, no limb weakness, no difficulty walking. Skin: No complaints of skin rash or skin lesions, no itching, no skin wound, no dry skin. Hematological/Lymphatic: No complaints of swollen glands, does not bleed easy. Allergic/Immunologic: No immunocompromised state. Endocrine:  No complaints of polyuria, no polydipsia.    Psychiatric/Behavioral: is not suicidal, no sleep disturbances, no anxiety or depression, no change in personality, no emotional problems.        Historical Information   Past Medical History:   Diagnosis Date   • Acute blood loss anemia 6/5/2020   • Arm contusion, right 2/2 Coumadin toxicity 6/4/2020   • Arm swelling 6/4/2020   • Atrial fibrillation ([address])    • Bilateral impacted cerumen 6/18/2020   • Breast cancer ([address]) 1989    left   • Diverticulitis-recent 6/5/2020   • History of chemotherapy 1989    left breast   • Irregular heart beat    • Medicare annual wellness visit, subsequent 6/18/2020   • Tick bite with subsequent removal of tick 5/28/2019    Last Assessment & Plan:  On doxy so no need for lymes testing or additional antibiotic     Past Surgical History:   Procedure Laterality Date   • APPENDECTOMY     • AUGMENTATION MAMMAPLASTY Right 1989    implant   • CARDIAC ELECTROPHYSIOLOGY STUDY, ESOPHAGEAL     • CARDIAC VALVE REPLACEMENT      MV   • COLONOSCOPY     • HYSTERECTOMY  2002   • MASTECTOMY Left 1989   • OOPHORECTOMY Bilateral 2002   • TONSILLECTOMY       Social History   Social History     Substance and Sexual Activity   Alcohol Use Yes    Comment: occasionally     Social History     Substance and Sexual Activity   Drug Use Never     Social History     Tobacco Use   Smoking Status Never   Smokeless Tobacco Never     Family History   Problem Relation Age of Onset   • Diabetes Mother    • Cancer Mother    • Breast cancer Mother 40   • Colon cancer Father    • Colon cancer Paternal Grandmother    • Colon cancer Paternal Grandfather    • No Known Problems Daughter    • No Known Problems Daughter    • Breast cancer Maternal Aunt 39   • Breast cancer Maternal Aunt         age unknown   • Breast cancer Maternal Aunt         age unknown   • Breast cancer Maternal Aunt         age unknown   • No Known Problems Paternal Aunt          Current Medications: has a current medication list which includes the following prescription(s): acetaminophen, alendronate, diclofenac sodium, digoxin, estradiol, hydrochlorothiazide, levothyroxine, lorazepam, melatonin, metoprolol succinate, multiple vitamin, pantoprazole, ra laxative, rosuvastatin, trazodone, venlafaxine, warfarin, famotidine, irbesartan, and probiotic product. Vital Signs: /80   Pulse 89   Ht 5' 2" (1.575 m)   Wt 69.4 kg (153 lb)   SpO2 98%   BMI 27.98 kg/m²       Physical Exam:   Constitutional  General Appearance: No acute distress, well appearing and well nourished  Head  Normocephalic  Eyes  Conjunctivae and lids: No swelling, erythema, or discharge. Pupils and irises: Equal, round and reactive to light. Ears, Nose, Mouth, and Throat  External inspection of ears and nose: Normal  Nasal mucosa, septum and turbinates: Normal without edema or erythema/   Oropharynx: Normal with no erythema, edema, exudate or lesions. Neck  Normal range of motion. Neck supple. Cardiovascular  Auscultation of the heart: Normal rate and rhythm, normal S1 and S2 without murmurs. Examination of the extremities for edema and/or varicosities: Normal  Pulmonary/Chest  Respiratory effort: No increased work of breathing or signs of respiratory distress. Auscultation of lungs: Clear to auscultation, equal breath sounds bilaterally, no wheezes, rales, no rhonchi. Abdomen  Abdomen: Non-tender, no masses. No succussion splash  Liver and spleen: No hepatomegaly or splenomegaly. Musculoskeletal  Gait and station: normal.  Digits and Nails: normal without clubbing or cyanosis. Inspection/palpation of joints, bones, and muscles: Normal  Neurological  No nystagmus or asterixis. Skin  Skin and subcutaneous tissue: Normal without rashes or lesions. Lymphatic  Palpation of the lymph nodes in neck: No lymphadenopathy.    Psychiatric  Orientation to person, place and time: Normal.  Mood and affect: Normal.         Labs:  Lab Results   Component Value Date    ALT 18 05/22/2020    AST 18 05/22/2020    BUN 12 06/12/2020    CALCIUM 8.8 06/12/2020     06/12/2020    CO2 27 06/12/2020    CREATININE 0.84 06/12/2020    HDL 75 06/25/2020    HCT 32.1 (L) 06/14/2020    HGB 10.0 (L) 06/14/2020     06/14/2020    K 3.8 06/12/2020    TRIG 119 06/25/2020    WBC 6.23 06/14/2020         X-Rays & Procedures:   No orders to display          [facility]  Outside Information  Results  CT RENAL STONE STUDY ABDOMEN PELVIS WO CONTRAST (Order [id number])     CT RENAL STONE STUDY ABDOMEN PELVIS WO CONTRAST  Order: [id number]  Impression        No acute abnormalities within the abdomen or pelvis detected. Signed by: Robin Chaudhari  Narrative      PROCEDURE:  CT ABDOMEN PELVIS WITHOUT CONTRAST     INDICATION:  Abdominal pain, acute, nonlocalized. COMPARISON:  This study was compared with the prior CT of the abdomen and pelvis dated 5/2/2020     TECHNIQUE: Multiple axial images of the abdomen and pelvis were obtained from the lung bases through the pubic symphysis without the intravenous administration of contrast on 8/29/2022 9:50 PM. This is a limited examination without the use of IV contrast.  Iterative Reconstruction and automatic exposure control were used for dose reduction. ORAL CONTRAST: No     FINDINGS:        - Lung Bases:  There is bibasilar subsegmental atelectasis.    - Liver:  Hypodense area arising from the left hepatic lobe, likely reflective of a cyst.    - Gallbladder/Ducts: Huang  Unremarkable    - Pancreas:  Grossly Unremarkable    - Spleen:  Grossly Unremarkable    - Adrenal Glands:  Punctate calcification involving the medial limb of the left adrenal gland. No nodularity.    - Right Kidney and Ureter: Unchanged large cyst arising from the midpole of the right kidney with a thin septation.  Additional hypodensities, likely reflective of cysts.    - Left Kidney and Ureter: Simple cysts are incidentally noted within the left kidney.    - Bladder:  Unremarkable    - Aorta:  There is a calcified atherosclerotic aorta.    - Lymph nodes:  No abdominal or pelvic adenopathy is seen.    - Stomach and Small Bowel:  Grossly Unremarkable    - Colon:  There is diverticulosis of the colon. No evidence of acute diverticulitis is seen on this limited noncontrast examination    - Pelvic Organs:  Grossly Unremarkable    - Bones:  Interval worsening of compression deformity involving the L1 vertebral body. Multilevel degenerative changes of the lumbar spine. Exam End: 08/29/22  9:50 PM    Specimen Collected: 08/29/22  9:53 PM Last Resulted: 08/29/22  9:58 PM   Received From: [address] [place] [address]  Result Received: 08/21/23  6:40 AM     View Encounter      Received Information              ______________________________________________________________________      Assessment & Plan:     Diagnoses and all orders for this visit:    Bloating    Gastroparesis      Patient is advised Phazyme for bloating. She will continue on the gastroparesis diet. She will also continue her Protonix. She will call me in 2 weeks with her progress. She is advised to occasionally substitute Ensure for a solid meal.  She will see me again in 1 year and unless she needs to see me sooner.

## 2023-09-14 DIAGNOSIS — R10.13 EPIGASTRIC PAIN: ICD-10-CM

## 2023-09-14 RX ORDER — PANTOPRAZOLE SODIUM 40 MG/1
TABLET, DELAYED RELEASE ORAL
Qty: 60 TABLET | Refills: 5 | Status: SHIPPED | OUTPATIENT
Start: 2023-09-14

## 2023-10-02 ENCOUNTER — APPOINTMENT (OUTPATIENT)
Dept: RADIOLOGY | Facility: CLINIC | Age: 78
End: 2023-10-02
Payer: MEDICARE

## 2023-10-02 ENCOUNTER — OFFICE VISIT (OUTPATIENT)
Dept: URGENT CARE | Facility: CLINIC | Age: 78
End: 2023-10-02
Payer: MEDICARE

## 2023-10-02 VITALS
HEIGHT: 62 IN | OXYGEN SATURATION: 96 % | BODY MASS INDEX: 28.16 KG/M2 | SYSTOLIC BLOOD PRESSURE: 134 MMHG | HEART RATE: 106 BPM | DIASTOLIC BLOOD PRESSURE: 95 MMHG | WEIGHT: 153 LBS | TEMPERATURE: 97.9 F

## 2023-10-02 DIAGNOSIS — M54.50 ACUTE LOW BACK PAIN WITHOUT SCIATICA, UNSPECIFIED BACK PAIN LATERALITY: Primary | ICD-10-CM

## 2023-10-02 DIAGNOSIS — M62.830 BACK MUSCLE SPASM: ICD-10-CM

## 2023-10-02 DIAGNOSIS — M54.50 ACUTE LOW BACK PAIN WITHOUT SCIATICA, UNSPECIFIED BACK PAIN LATERALITY: ICD-10-CM

## 2023-10-02 PROCEDURE — 99213 OFFICE O/P EST LOW 20 MIN: CPT | Performed by: STUDENT IN AN ORGANIZED HEALTH CARE EDUCATION/TRAINING PROGRAM

## 2023-10-02 PROCEDURE — G0463 HOSPITAL OUTPT CLINIC VISIT: HCPCS | Performed by: STUDENT IN AN ORGANIZED HEALTH CARE EDUCATION/TRAINING PROGRAM

## 2023-10-02 PROCEDURE — 72100 X-RAY EXAM L-S SPINE 2/3 VWS: CPT

## 2023-10-02 RX ORDER — LIDOCAINE 50 MG/G
1 PATCH TOPICAL DAILY
Qty: 10 PATCH | Refills: 0 | Status: SHIPPED | OUTPATIENT
Start: 2023-10-02

## 2023-10-02 NOTE — PROGRESS NOTES
[facility]        NAME: Onel Deshpande is a 69 y.o. female  : 1945    MRN: [id number]  DATE: 2023  TIME: 9:52 AM    Assessment and Orders   Acute low back pain without sciatica, unspecified back pain laterality [M54.50]  1. Acute low back pain without sciatica, unspecified back pain laterality  XR spine lumbar 2 or 3 views injury    lidocaine (Lidoderm) 5 %      2. Back muscle spasm              Plan and Discussion        X-ray of the lumbar spine reviewed and compared to last x-ray on file which was in . There do not appear to be any changes to the compression fracture of L1 and L3. No acute changes noted on wet read. We will follow-up with final radiology read. Patient would also like this final radiology read printed and sent to her home. On exam, there is palpable muscle spasm in the left paraspinal musculature around L1 and L2. This is likely secondary to twisting from her fall. As Tylenol is not doing much for her pain, will prescribe topical lidocaine patches. Patient states she did not follow-up with spinal surgery after being referred by pain management in . I encouraged her to follow-up with a spinal surgeon for consultation. Discussed ED precautions including (but not limited to)  • Difficultly breathing or shortness of breath  • Chest pain  • Acutely worsening symptoms. Risks and benefits discussed. Patient understands and agrees with the plan. Follow up with PCP. Chief Complaint     Chief Complaint   Patient presents with   • Back Pain     This past Friday as she was walking to her vehicle, tripped over a rail and fell toward her car, she tried brace herself as she went down twisting her back. Patient mentioned she had some fractured vertebrae's in the past Lumbar 4. The pain has radiated around her lower back and into her rib cage. Profound inhaling hurts, getting out of bed is difficult and sharp.  Tylenol has not been helping with the pain, cant take Advil since she is on blood thinners         History of Present Illness       Patient is a 80-year-old female who presents after a fall. Patient fell 3 days ago against her car when she slipped on the sidewalk. It was a mechanical fall. She did not strike her head. She states that as she fell she got to the car and twisted her back. She felt immediate pain in her left lower back. Patient has long history of lower back pain. Has established severe compression fracture of L1 and compression fracture of L3. She has been seen by pain management for this. She was referred to spinal surgery but she did not follow-up with them. She states normally her back does not bother her very much. She has more pain issues with her right knee and right hip. Patient has chronic numbness and tingling in bilateral legs. She denies bowel or bladder incontinence. She denies fevers. Pain is worse when she tries to lean forward. Pain is located in the midline and left side. Radiates laterally. She states it feels swollen to her. No bruising. She states that when she fell, she did not fall onto her back but rather twisted her lower back she went to brace herself. Back Pain        Review of Systems   Review of Systems   Musculoskeletal: Positive for back pain.          Current Medications       Current Outpatient Medications:   •  acetaminophen (TYLENOL) 325 mg tablet, Take 2 tablets (650 mg total) by mouth every 6 (six) hours as needed for mild pain, headaches or fever (Patient taking differently: Take 1,000 mg by mouth daily ALSO 500MG PRN), Disp: 30 tablet, Rfl: 0  •  lidocaine (Lidoderm) 5 %, Apply 1 patch topically over 12 hours daily Remove & Discard patch within 12 hours or as directed by MD, Disp: 10 patch, Rfl: 0  •  alendronate (FOSAMAX) 70 mg tablet, Take 1 tablet (70 mg total) by mouth every 7 days, Disp: 12 tablet, Rfl: 3  •  diclofenac sodium (VOLTAREN) 1 %, Apply 2 g topically 4 (four) times a day (Patient taking differently: Apply 2 g topically 4 (four) times a day PRN), Disp: 1 Tube, Rfl: 2  •  digoxin (LANOXIN) 0.125 mg tablet, Take 125 mcg by mouth daily, Disp: , Rfl:   •  estradiol (ESTRACE) 0.1 mg/g vaginal cream, Apply a pea-sized amount to the vaginal opening 3 times a week (Mon-Wed-Fri), Disp: 42.5 g, Rfl: 3  •  famotidine (PEPCID) 20 mg tablet, take 1 tablet by mouth twice a day (Patient not taking: No sig reported), Disp: 60 tablet, Rfl: 1  •  hydrochlorothiazide (HYDRODIURIL) 25 mg tablet, take 1 tablet by mouth once daily, Disp: 30 tablet, Rfl: 11  •  irbesartan (AVAPRO) 150 mg tablet, Take 1 tablet (150 mg total) by mouth daily at bedtime, Disp: 30 tablet, Rfl: 3  •  levothyroxine 88 mcg tablet, Take 88 mcg by mouth daily, Disp: , Rfl:   •  LORazepam (ATIVAN) 1 mg tablet, Take 1 mg by mouth daily at bedtime, Disp: , Rfl:   •  melatonin 1 mg, Take 20 mg by mouth daily at bedtime as needed, Disp: , Rfl:   •  metoprolol succinate (TOPROL-XL) 50 mg 24 hr tablet, 50 mg 2 (two) times a day , Disp: , Rfl:   •  Multiple Vitamin (MULTIVITAMIN PO), Take by mouth in the morning PATCH, Disp: , Rfl:   •  pantoprazole (PROTONIX) 40 mg tablet, take 1 tablet by mouth twice a day, Disp: 60 tablet, Rfl: 5  •  Probiotic Product (CULTURELLE PROBIOTICS PO), Take by mouth   (Patient not taking: Reported on 8/17/2023), Disp: , Rfl:   •  RA LAXATIVE powder, take 17GM (DISSOLVED IN WATER) by mouth once daily if needed for constipation, Disp: , Rfl:   •  rosuvastatin (CRESTOR) 5 mg tablet, take 1 tablet (5 mg) by oral route once daily Oral 1, Disp: , Rfl:   •  traZODone (DESYREL) 50 mg tablet, Take 50 mg by mouth daily at bedtime, Disp: , Rfl:   •  venlafaxine (EFFEXOR) 75 mg tablet, Take 75 mg by mouth daily , Disp: , Rfl:   •  warfarin (COUMADIN) 2.5 mg tablet, Every Monday, Wednesday and Friday, Disp: , Rfl: 0    Current Allergies     Allergies as of 10/02/2023 - Reviewed 08/22/2023   Allergen Reaction Noted   • Penicillins Anaphylaxis and Other (See Comments) 05/28/2019   • Iodides  05/28/2019   • Amiodarone Other (See Comments) 11/01/2022   • Clindamycin Other (See Comments) 07/07/2021   • Iodinated contrast media Other (See Comments) 04/07/2022   • Morphine Rash 10/06/2022            The following portions of the patient's history were reviewed and updated as appropriate: allergies, current medications, past family history, past medical history, past social history, past surgical history and problem list.     Past Medical History:   Diagnosis Date   • Acute blood loss anemia 6/5/2020   • Arm contusion, right 2/2 Coumadin toxicity 6/4/2020   • Arm swelling 6/4/2020   • Atrial fibrillation ([address])    • Bilateral impacted cerumen 6/18/2020   • Breast cancer ([address]) 1989    left   • Diverticulitis-recent 6/5/2020   • History of chemotherapy 1989    left breast   • Irregular heart beat    • Medicare annual wellness visit, subsequent 6/18/2020   • Tick bite with subsequent removal of tick 5/28/2019    Last Assessment & Plan:  On doxy so no need for lymes testing or additional antibiotic       Past Surgical History:   Procedure Laterality Date   • APPENDECTOMY     • AUGMENTATION MAMMAPLASTY Right 1989    implant   • CARDIAC ELECTROPHYSIOLOGY STUDY, ESOPHAGEAL     • CARDIAC VALVE REPLACEMENT      MV   • COLONOSCOPY     • HYSTERECTOMY  2002   • MASTECTOMY Left 1989   • OOPHORECTOMY Bilateral 2002   • TONSILLECTOMY         Family History   Problem Relation Age of Onset   • Diabetes Mother    • Cancer Mother    • Breast cancer Mother 40   • Colon cancer Father    • Colon cancer Paternal Grandmother    • Colon cancer Paternal Grandfather    • No Known Problems Daughter    • No Known Problems Daughter    • Breast cancer Maternal Aunt 39   • Breast cancer Maternal Aunt         age unknown   • Breast cancer Maternal Aunt         age unknown   • Breast cancer Maternal Aunt         age unknown   • No Known Problems Paternal Aunt          Medications have been verified. Objective   /95   Pulse (!) 106   Temp 97.9 °F (36.6 °C)   Ht 5' 2" (1.575 m)   Wt 69.4 kg (153 lb)   SpO2 96%   BMI 27.98 kg/m²   No LMP recorded. Patient has had a hysterectomy. Physical Exam     Physical Exam  Constitutional:       General: She is not in acute distress. Pulmonary:      Effort: No respiratory distress. Abdominal:      General: There is distension (Patient has chronic bowel issues). Musculoskeletal:      Lumbar back: Spasms, tenderness and bony tenderness present. No swelling, edema, deformity, signs of trauma or lacerations. Decreased range of motion. Negative right straight leg raise test and negative left straight leg raise test. Scoliosis present. Back:    Neurological:      General: No focal deficit present. Mental Status: She is alert and oriented to person, place, and time.    Psychiatric:         Mood and Affect: Mood normal.         Behavior: Behavior normal.               Yoko Doe DO

## 2023-10-27 ENCOUNTER — RA CDI HCC (OUTPATIENT)
Dept: OTHER | Facility: HOSPITAL | Age: 78
End: 2023-10-27

## 2023-11-20 PROBLEM — I25.2 HISTORY OF NON-ST ELEVATION MYOCARDIAL INFARCTION (NSTEMI): Status: ACTIVE | Noted: 2022-10-03

## 2023-11-20 PROBLEM — I21.4 NSTEMI (NON-ST ELEVATED MYOCARDIAL INFARCTION) (HCC): Status: ACTIVE | Noted: 2022-10-03

## 2023-12-13 ENCOUNTER — TELEPHONE (OUTPATIENT)
Age: 78
End: 2023-12-13

## 2023-12-13 NOTE — TELEPHONE ENCOUNTER
Caller: Gabriela    Doctor: Rachel Freedman    Reason for call: Patient called in to confirm her appointment date time and location     Call back#: NA

## 2023-12-18 DIAGNOSIS — M81.0 AGE-RELATED OSTEOPOROSIS WITHOUT CURRENT PATHOLOGICAL FRACTURE: ICD-10-CM

## 2023-12-18 RX ORDER — ALENDRONATE SODIUM 70 MG/1
70 TABLET ORAL
Qty: 12 TABLET | Refills: 3 | Status: SHIPPED | OUTPATIENT
Start: 2023-12-18

## 2024-04-11 ENCOUNTER — TRANSCRIBE ORDERS (OUTPATIENT)
Dept: GASTROENTEROLOGY | Facility: CLINIC | Age: 79
End: 2024-04-11

## 2024-05-02 ENCOUNTER — APPOINTMENT (OUTPATIENT)
Dept: LAB | Facility: CLINIC | Age: 79
End: 2024-05-02
Payer: COMMERCIAL

## 2024-05-02 DIAGNOSIS — I48.0 PAROXYSMAL ATRIAL FIBRILLATION (HCC): ICD-10-CM

## 2024-05-02 LAB
INR PPP: 1.08 (ref 0.84–1.19)
PROTHROMBIN TIME: 13.9 SECONDS (ref 11.6–14.5)

## 2024-05-02 PROCEDURE — 85610 PROTHROMBIN TIME: CPT

## 2024-05-02 PROCEDURE — 36415 COLL VENOUS BLD VENIPUNCTURE: CPT

## 2024-09-10 ENCOUNTER — RX ONLY (RX ONLY)
Age: 79
End: 2024-09-10

## 2024-09-10 ENCOUNTER — DOCTOR'S OFFICE (OUTPATIENT)
Dept: URBAN - METROPOLITAN AREA CLINIC 163 | Facility: CLINIC | Age: 79
Setting detail: OPHTHALMOLOGY
End: 2024-09-10
Payer: MEDICARE

## 2024-09-10 DIAGNOSIS — D31.31: ICD-10-CM

## 2024-09-10 DIAGNOSIS — H52.4: ICD-10-CM

## 2024-09-10 DIAGNOSIS — H04.123: ICD-10-CM

## 2024-09-10 DIAGNOSIS — H53.15: ICD-10-CM

## 2024-09-10 DIAGNOSIS — H40.013: ICD-10-CM

## 2024-09-10 PROCEDURE — 92250 FUNDUS PHOTOGRAPHY W/I&R: CPT | Performed by: OPHTHALMOLOGY

## 2024-09-10 PROCEDURE — 92015 DETERMINE REFRACTIVE STATE: CPT | Performed by: OPHTHALMOLOGY

## 2024-09-10 PROCEDURE — 92083 EXTENDED VISUAL FIELD XM: CPT | Performed by: OPHTHALMOLOGY

## 2024-09-10 PROCEDURE — 92004 COMPRE OPH EXAM NEW PT 1/>: CPT | Performed by: OPHTHALMOLOGY

## 2024-09-10 ASSESSMENT — REFRACTION_MANIFEST
OU_VA: 20/20
OS_VA1: 20/20
OD_ADD: +2.50
OS_CYLINDER: +0.75
OS_AXIS: 180
OS_ADD: +2.50
OD_CYLINDER: +0.75
OD_SPHERE: -0.75
OD_VA1: 20/20
OS_SPHERE: -2.00
OD_AXIS: 170

## 2024-09-10 ASSESSMENT — TONOMETRY
OD_IOP_MMHG: 14
OS_IOP_MMHG: 13

## 2024-09-10 ASSESSMENT — REFRACTION_AUTOREFRACTION
OS_SPHERE: -2.25
OS_AXIS: 177
OD_AXIS: 175
OS_CYLINDER: +0.75
OD_CYLINDER: +1.00
OD_SPHERE: -1.25

## 2024-09-10 ASSESSMENT — VISUAL ACUITY
OS_BCVA: 20/25-
OD_BCVA: 20/60-

## 2024-09-10 ASSESSMENT — CONFRONTATIONAL VISUAL FIELD TEST (CVF)
OS_FINDINGS: FULL
OD_FINDINGS: FULL

## 2024-10-12 ENCOUNTER — APPOINTMENT (OUTPATIENT)
Dept: LAB | Facility: CLINIC | Age: 79
End: 2024-10-12
Payer: COMMERCIAL

## 2024-10-12 DIAGNOSIS — I48.91 ATRIAL FIBRILLATION, UNSPECIFIED TYPE (HCC): ICD-10-CM

## 2024-10-12 LAB
INR PPP: 1.74 (ref 0.85–1.19)
PROTHROMBIN TIME: 20.5 SECONDS (ref 12.3–15)

## 2024-10-12 PROCEDURE — 36415 COLL VENOUS BLD VENIPUNCTURE: CPT

## 2024-10-12 PROCEDURE — 85610 PROTHROMBIN TIME: CPT

## 2024-11-11 ENCOUNTER — APPOINTMENT (OUTPATIENT)
Dept: LAB | Facility: CLINIC | Age: 79
End: 2024-11-11
Payer: COMMERCIAL

## 2024-11-11 DIAGNOSIS — I48.91 ATRIAL FIBRILLATION, UNSPECIFIED TYPE (HCC): ICD-10-CM

## 2024-11-11 LAB
INR PPP: 3.92 (ref 0.85–1.19)
PROTHROMBIN TIME: 37.7 SECONDS (ref 12.3–15)

## 2024-11-11 PROCEDURE — 85610 PROTHROMBIN TIME: CPT

## 2024-11-11 PROCEDURE — 36415 COLL VENOUS BLD VENIPUNCTURE: CPT

## 2024-12-02 ENCOUNTER — APPOINTMENT (OUTPATIENT)
Dept: LAB | Facility: CLINIC | Age: 79
End: 2024-12-02
Payer: COMMERCIAL

## 2024-12-02 DIAGNOSIS — I48.91 ATRIAL FIBRILLATION, UNSPECIFIED TYPE (HCC): ICD-10-CM

## 2024-12-02 LAB
INR PPP: 4 (ref 0.85–1.19)
PROTHROMBIN TIME: 38.3 SECONDS (ref 12.3–15)

## 2024-12-02 PROCEDURE — 36415 COLL VENOUS BLD VENIPUNCTURE: CPT

## 2024-12-02 PROCEDURE — 85610 PROTHROMBIN TIME: CPT

## 2025-01-10 ENCOUNTER — APPOINTMENT (OUTPATIENT)
Dept: LAB | Facility: CLINIC | Age: 80
End: 2025-01-10
Payer: COMMERCIAL

## 2025-01-10 DIAGNOSIS — I48.19 PERSISTENT ATRIAL FIBRILLATION (HCC): ICD-10-CM

## 2025-01-10 DIAGNOSIS — I48.91 ATRIAL FIBRILLATION, UNSPECIFIED TYPE (HCC): ICD-10-CM

## 2025-01-10 LAB
ANION GAP SERPL CALCULATED.3IONS-SCNC: 8 MMOL/L (ref 4–13)
APTT PPP: 32 SECONDS (ref 23–34)
BUN SERPL-MCNC: 17 MG/DL (ref 5–25)
CALCIUM SERPL-MCNC: 9.4 MG/DL (ref 8.4–10.2)
CHLORIDE SERPL-SCNC: 100 MMOL/L (ref 96–108)
CO2 SERPL-SCNC: 29 MMOL/L (ref 21–32)
CREAT SERPL-MCNC: 0.75 MG/DL (ref 0.6–1.3)
ERYTHROCYTE [DISTWIDTH] IN BLOOD BY AUTOMATED COUNT: 13.4 % (ref 11.6–15.1)
GFR SERPL CREATININE-BSD FRML MDRD: 76 ML/MIN/1.73SQ M
GLUCOSE SERPL-MCNC: 130 MG/DL (ref 65–140)
HCT VFR BLD AUTO: 42.8 % (ref 34.8–46.1)
HGB BLD-MCNC: 13.7 G/DL (ref 11.5–15.4)
INR PPP: 1.01 (ref 0.85–1.19)
MAGNESIUM SERPL-MCNC: 1.9 MG/DL (ref 1.9–2.7)
MCH RBC QN AUTO: 32.1 PG (ref 26.8–34.3)
MCHC RBC AUTO-ENTMCNC: 32 G/DL (ref 31.4–37.4)
MCV RBC AUTO: 100 FL (ref 82–98)
PLATELET # BLD AUTO: 378 THOUSANDS/UL (ref 149–390)
PMV BLD AUTO: 10.9 FL (ref 8.9–12.7)
POTASSIUM SERPL-SCNC: 4.4 MMOL/L (ref 3.5–5.3)
PROTHROMBIN TIME: 13.6 SECONDS (ref 12.3–15)
RBC # BLD AUTO: 4.27 MILLION/UL (ref 3.81–5.12)
SODIUM SERPL-SCNC: 137 MMOL/L (ref 135–147)
WBC # BLD AUTO: 10.66 THOUSAND/UL (ref 4.31–10.16)

## 2025-01-10 PROCEDURE — 85027 COMPLETE CBC AUTOMATED: CPT

## 2025-01-10 PROCEDURE — 36415 COLL VENOUS BLD VENIPUNCTURE: CPT

## 2025-01-10 PROCEDURE — 85610 PROTHROMBIN TIME: CPT

## 2025-01-10 PROCEDURE — 80048 BASIC METABOLIC PNL TOTAL CA: CPT

## 2025-01-10 PROCEDURE — 83735 ASSAY OF MAGNESIUM: CPT

## 2025-01-10 PROCEDURE — 85730 THROMBOPLASTIN TIME PARTIAL: CPT

## 2025-02-11 ENCOUNTER — APPOINTMENT (OUTPATIENT)
Dept: LAB | Facility: CLINIC | Age: 80
End: 2025-02-11
Payer: COMMERCIAL

## 2025-02-11 DIAGNOSIS — I48.91 ATRIAL FIBRILLATION, UNSPECIFIED TYPE (HCC): ICD-10-CM

## 2025-02-11 PROCEDURE — 36415 COLL VENOUS BLD VENIPUNCTURE: CPT

## 2025-02-11 PROCEDURE — 85610 PROTHROMBIN TIME: CPT

## 2025-02-12 LAB
INR PPP: 3.27 (ref 0.85–1.19)
PROTHROMBIN TIME: 32.9 SECONDS (ref 12.3–15)

## 2025-02-26 ENCOUNTER — OFFICE VISIT (OUTPATIENT)
Dept: FAMILY MEDICINE CLINIC | Facility: CLINIC | Age: 80
End: 2025-02-26
Payer: COMMERCIAL

## 2025-02-26 VITALS
WEIGHT: 168 LBS | DIASTOLIC BLOOD PRESSURE: 68 MMHG | HEIGHT: 62 IN | SYSTOLIC BLOOD PRESSURE: 126 MMHG | TEMPERATURE: 98.9 F | BODY MASS INDEX: 30.91 KG/M2 | HEART RATE: 78 BPM | OXYGEN SATURATION: 95 %

## 2025-02-26 DIAGNOSIS — I48.19 PERSISTENT ATRIAL FIBRILLATION (HCC): ICD-10-CM

## 2025-02-26 DIAGNOSIS — R09.81 NASAL CONGESTION: ICD-10-CM

## 2025-02-26 DIAGNOSIS — Z13.220 SCREENING FOR LIPID DISORDERS: ICD-10-CM

## 2025-02-26 DIAGNOSIS — M54.41 CHRONIC MIDLINE LOW BACK PAIN WITH RIGHT-SIDED SCIATICA: ICD-10-CM

## 2025-02-26 DIAGNOSIS — F51.01 PRIMARY INSOMNIA: ICD-10-CM

## 2025-02-26 DIAGNOSIS — Z13.1 SCREENING FOR DIABETES MELLITUS (DM): ICD-10-CM

## 2025-02-26 DIAGNOSIS — Z12.31 ENCOUNTER FOR SCREENING MAMMOGRAM FOR BREAST CANCER: ICD-10-CM

## 2025-02-26 DIAGNOSIS — Z00.00 ROUTINE HEALTH MAINTENANCE: ICD-10-CM

## 2025-02-26 DIAGNOSIS — I50.32 CHRONIC DIASTOLIC HEART FAILURE (HCC): ICD-10-CM

## 2025-02-26 DIAGNOSIS — G89.29 CHRONIC MIDLINE LOW BACK PAIN WITH RIGHT-SIDED SCIATICA: ICD-10-CM

## 2025-02-26 DIAGNOSIS — Z00.00 MEDICARE ANNUAL WELLNESS VISIT, SUBSEQUENT: Primary | ICD-10-CM

## 2025-02-26 DIAGNOSIS — J01.00 SUBACUTE MAXILLARY SINUSITIS: ICD-10-CM

## 2025-02-26 DIAGNOSIS — R32 URINARY INCONTINENCE, UNSPECIFIED TYPE: ICD-10-CM

## 2025-02-26 PROBLEM — K31.84 GASTROPARESIS: Status: ACTIVE | Noted: 2025-02-26

## 2025-02-26 PROBLEM — M25.561 CHRONIC PAIN OF RIGHT KNEE: Status: RESOLVED | Noted: 2020-11-24 | Resolved: 2025-02-26

## 2025-02-26 LAB
SARS-COV-2 AG UPPER RESP QL IA: NEGATIVE
VALID CONTROL: NORMAL

## 2025-02-26 PROCEDURE — 87811 SARS-COV-2 COVID19 W/OPTIC: CPT

## 2025-02-26 PROCEDURE — 99214 OFFICE O/P EST MOD 30 MIN: CPT

## 2025-02-26 PROCEDURE — G0439 PPPS, SUBSEQ VISIT: HCPCS

## 2025-02-26 PROCEDURE — G2211 COMPLEX E/M VISIT ADD ON: HCPCS

## 2025-02-26 RX ORDER — LORAZEPAM 0.5 MG/1
0.5 TABLET ORAL
Qty: 20 TABLET | Refills: 0 | Status: SHIPPED | OUTPATIENT
Start: 2025-02-26

## 2025-02-26 RX ORDER — WARFARIN SODIUM 5 MG/1
1 TABLET ORAL DAILY
COMMUNITY
Start: 2025-01-25

## 2025-02-26 RX ORDER — DOFETILIDE 0.25 MG/1
1 CAPSULE ORAL 3 TIMES DAILY
COMMUNITY
Start: 2025-01-20

## 2025-02-26 RX ORDER — DILTIAZEM HYDROCHLORIDE 120 MG/1
120 TABLET, FILM COATED ORAL DAILY
COMMUNITY

## 2025-02-26 RX ORDER — LANOLIN ALCOHOL/MO/W.PET/CERES
1 CREAM (GRAM) TOPICAL DAILY
COMMUNITY
Start: 2025-01-18

## 2025-02-26 RX ORDER — GABAPENTIN 100 MG/1
100 CAPSULE ORAL
COMMUNITY
Start: 2025-01-17 | End: 2025-02-26 | Stop reason: SDUPTHER

## 2025-02-26 RX ORDER — GABAPENTIN 100 MG/1
100 CAPSULE ORAL
Qty: 30 CAPSULE | Refills: 2 | Status: SHIPPED | OUTPATIENT
Start: 2025-02-26

## 2025-02-26 RX ORDER — ROSUVASTATIN CALCIUM 10 MG/1
1 TABLET, COATED ORAL DAILY
COMMUNITY
Start: 2025-02-01

## 2025-02-26 RX ORDER — DOXYCYCLINE HYCLATE 100 MG
100 TABLET ORAL 2 TIMES DAILY
Qty: 14 TABLET | Refills: 0 | Status: SHIPPED | OUTPATIENT
Start: 2025-02-26 | End: 2025-03-05

## 2025-02-26 NOTE — ASSESSMENT & PLAN NOTE
Wt Readings from Last 3 Encounters:   02/26/25 76.2 kg (168 lb)   10/02/23 69.4 kg (153 lb)   08/22/23 69.4 kg (153 lb)   stable control at this time  2/13, last seen by cardiology  Denies shortness of breath chest pain difficulty breathing  Lungs CTA, heart regular rate and rhythm, no discernible leg swelling  Continue follow-up with cardiology  Continue medication regimen, recommend sodium and fluid restriction, measurement of daily weight

## 2025-02-26 NOTE — ASSESSMENT & PLAN NOTE
Stable controlled at this time   2/13 last seen by cardiology out of network, patient is routine follow  Exam revealing/reassuring, heart regular rate and rhythm  Continue metoprolol, continue warfarin  Continue follow-up with cardiology patient recently started on dofetilide and states her symptoms have been controlled

## 2025-02-26 NOTE — PATIENT INSTRUCTIONS
"Patient Education     Urinary incontinence in females   The Basics   Written by the doctors and editors at [facility]   What is urinary incontinence? -- Urinary incontinence is the medical term for when a person leaks urine or loses bladder control.  Incontinence is a very common problem, but it is not a normal part of aging. If you have this problem, there are treatments that can help. There are also things that you can do on your own to stop or reduce urine leakage so you don't have to \"just live with it.\"  What are the symptoms of incontinence? -- There are different types of incontinence. Each causes different symptoms. The 3 most common types are:   Stress incontinence - With stress incontinence, you leak urine when you laugh, cough, sneeze, or do anything that \"stresses\" the belly. Stress incontinence is most common in females, especially those who have had a baby.   Urgency incontinence - With urgency incontinence, you feel a strong need to urinate all of a sudden. This is also known as \"urge incontinence.\" Often, the \"urge\" is so strong that you can't make it to the bathroom in time. \"Overactive bladder\" is another term for having a sudden, frequent urge to urinate. People with overactive bladder might or might not actually leak urine.   Mixed incontinence - With mixed incontinence, you have symptoms of both stress and urgency incontinence.  Is there anything I can do on my own to feel better? -- Yes. Here are some things that can help reduce urine leaks:   Reduce the amount of liquid that you drink, especially a few hours before bed.   Cut down on any foods or drinks that make your symptoms worse. Some people find that alcohol, caffeine, or spicy or acidic foods irritate the bladder.   Try to lose weight, if you are overweight. Your doctor or nurse can help you do this in a healthy way.   If you have diabetes, keep your blood sugar as close to your goal level as possible.   If you take medicines called " "diuretics, plan ahead. These medicines increase the need to urinate. Try to take them when you know you will be near a bathroom for a few hours. If you keep having problems with leakage because of diuretics, ask your doctor if you can take a lower dose or switch to a different medicine.  These techniques can also help improve bladder control:   Bladder retraining - During bladder retraining, you go to the bathroom at scheduled times. For instance, you might decide that you will go every hour. Make yourself go every hour, even if you don't feel like you need to. Try to wait the whole hour, even if you need to go sooner. Then, once you get used to going every hour, increase the amount of time you wait in between bathroom visits. Over time, you might be able to \"retrain\" your bladder to wait 3 or 4 hours between bathroom visits.   Pelvic floor muscle training - This involves learning exercises to strengthen and relax your pelvic muscles. These include the muscles that control the flow of urine and bowel movements. When done right, these exercises can help. But people often do them wrong. Ask your doctor or nurse how to do them right. Your doctor might suggest working with a physical therapist who has special training in these exercises.  Should I see my doctor or nurse? -- Yes. Your doctor or nurse can find out what might be causing your incontinence. They can also suggest ways to relieve the problem.  When you speak to your doctor or nurse, ask if any of the medicines you take could be causing your symptoms. Some medicines can cause incontinence or make it worse.  Some people choose to wear pads or special underwear. These can help if you accidentally leak urine once in a while. But they can also cause skin irritation if you use them a lot. If you have incontinence, ask your doctor or nurse how to treat it.  How is incontinence treated? -- The treatment options differ depending on what type of incontinence you have. " Some of the options include:   Medicines to relax the bladder   Surgery to repair the tissues that support the bladder or to improve the flow of urine   Electrical stimulation of the nerves that relax the bladder  Urinary incontinence is more common in people who have been through menopause. (Menopause is when you stop having monthly periods). Some people have vaginal dryness after menopause. If this is the case for you, a treatment called vaginal estrogen might help.  What will my life be like? -- Many people with incontinence can regain bladder control or at least reduce the amount of leakage they have. The most important thing is to tell your doctor or nurse. Then, work with them to find an approach that helps you.  All topics are updated as new evidence becomes available and our peer review process is complete.  This topic retrieved from [employer] on: Feb 26, 2024.  Topic 61066 Version 18.0  Release: 32.2.4 - C32.56  © 2024 UpToDate, Inc. and/or its affiliates. All rights reserved.  figure 1: Location of the bladder     This drawing shows the side view of a woman's body. The bladder is in front of the vagina. The urethra is the tube that carries urine from the bladder out of the body.  Graphic 085530 Version 1.0  Consumer Information Use and Disclaimer   Disclaimer: This generalized information is a limited summary of diagnosis, treatment, and/or medication information. It is not meant to be comprehensive and should be used as a tool to help the user understand and/or assess potential diagnostic and treatment options. It does NOT include all information about conditions, treatments, medications, side effects, or risks that may apply to a specific patient. It is not intended to be medical advice or a substitute for the medical advice, diagnosis, or treatment of a health care provider based on the health care provider's examination and assessment of a patient's specific and unique circumstances. Patients must speak  "with a health care provider for complete information about their health, medical questions, and treatment options, including any risks or benefits regarding use of medications. This information does not endorse any treatments or medications as safe, effective, or approved for treating a specific patient. UpToDate, Inc. and its affiliates disclaim any warranty or liability relating to this information or the use thereof.The use of this information is governed by the Terms of Use, available at https://www.[employer].com/en/know/clinical-effectiveness-terms. 2024© UpToDate, Inc. and its affiliates and/or licensors. All rights reserved.  Copyright   © 2024 UpToDate, Inc. and/or its affiliates. All rights reserved.    Patient Education     Pelvic floor muscle exercises   The Basics   Written by the doctors and editors at [employer]   What is the pelvic floor? -- The \"pelvic floor\" is the name for the muscles that support the organs in the pelvis. These organs include the bladder and rectum. In the female pelvis, they also include the uterus (figure 1).  What are pelvic floor muscle exercises? -- These are exercises that can make your pelvic floor muscles stronger. They involve learning ways to tighten and relax specific muscles.  Pelvic floor muscle exercises can help keep you from leaking urine, gas, or bowel movements. They can also help with a condition called \"pelvic organ prolapse.\" This is when the organs in the lower belly drop down and press against or bulge into the vagina.  One way to strengthen your pelvic floor muscles is to do exercises. These are also known as \"Kegel\" exercises.  How do I do pelvic floor muscle exercises? -- If you want to try pelvic floor muscle exercises, start by talking to your doctor or nurse. They can talk to you about whether these exercises can help you. They can also teach you how to do them correctly.  You will need to learn which muscles to tighten and relax. It is sometimes " "hard to figure out the right muscles.  Some ways you can practice:   People with female or male anatomy - Squeeze the muscles you would use to avoid passing gas.   People with female anatomy - Put a finger inside your vagina and squeeze the muscles around your finger. Or you can imagine that you are sitting on a marble and have to pick it up using your vagina.   People with male anatomy - Squeeze the muscles that control the flow of urine. These exercises might help reduce urine leaks in people who have had surgery to treat prostate cancer or an enlarged prostate.  No matter how you learn to do pelvic floor muscle exercises, know that the muscles involved are not in your belly, thighs, or buttocks.  After you learn which muscles to tighten and relax, you can do the exercises in any position (standing, sitting, or lying down).  Should I see a physical therapist? -- Your doctor or nurse might suggest working with a physical therapist who has special training in pelvic floor issues. They can check your muscle strength and teach you specific exercises.  How often should I do the exercises? -- A common approach is to try to do a set of the exercises 3 times a day.  For each set, do the following about 10 times:   Squeeze your pelvic muscles.   Hold the muscles tight for about 10 seconds.   Relax the muscles completely.  Keep up this routine for at least a few months. You will probably notice results, but it might take a few weeks to several months.  How do pelvic floor muscle exercises help? -- Pelvic floor muscle exercises can help:   Prevent urine leaks in people who have \"stress incontinence\" - This means that they leak urine when they cough, laugh, sneeze, or strain.   Control sudden urges to urinate - These happen to people with \"urinary urgency\" or \"urge incontinence.\"   Control the release of gas or bowel movements   Improve symptoms caused by pelvic organ prolapse - These can include pressure or bulging in the " "vagina. If you have these symptoms, see your doctor or nurse to find out the cause.  It might take a few months of doing the exercises regularly before you notice them working. If you have been doing pelvic floor muscle exercises for several months and they don't seem to be making a difference, tell your doctor or nurse. They might suggest seeing a physical therapist or trying other treatments for your condition.  All topics are updated as new evidence becomes available and our peer review process is complete.  This topic retrieved from [employer] on: Feb 26, 2024.  Topic 84868 Version 15.0  Release: 32.2.4 - C32.56  © 2024 UpToDate, Inc. and/or its affiliates. All rights reserved.  figure 1: Pelvic floor muscles     The \"pelvic floor\" is a group of muscles that support the organs in the pelvis. In females, these organs include the uterus, bladder, and rectum.  Graphic 681183 Version 2.0  Consumer Information Use and Disclaimer   Disclaimer: This generalized information is a limited summary of diagnosis, treatment, and/or medication information. It is not meant to be comprehensive and should be used as a tool to help the user understand and/or assess potential diagnostic and treatment options. It does NOT include all information about conditions, treatments, medications, side effects, or risks that may apply to a specific patient. It is not intended to be medical advice or a substitute for the medical advice, diagnosis, or treatment of a health care provider based on the health care provider's examination and assessment of a patient's specific and unique circumstances. Patients must speak with a health care provider for complete information about their health, medical questions, and treatment options, including any risks or benefits regarding use of medications. This information does not endorse any treatments or medications as safe, effective, or approved for treating a specific patient. UpToDate, Inc. and its " affiliates disclaim any warranty or liability relating to this information or the use thereof.The use of this information is governed by the Terms of Use, available at https://www.wolterskluwer.com/en/know/clinical-effectiveness-terms. 2024© UpToDate, Inc. and its affiliates and/or licensors. All rights reserved.  Copyright   © 2024 UpToDate, Inc. and/or its affiliates. All rights reserved.    Medicare Preventive Visit Patient Instructions  Thank you for completing your Welcome to Medicare Visit or Medicare Annual Wellness Visit today. Your next wellness visit will be due in one year (2/27/2026).  The screening/preventive services that you may require over the next 5-10 years are detailed below. Some tests may not apply to you based off risk factors and/or age. Screening tests ordered at today's visit but not completed yet may show as past due. Also, please note that scanned in results may not display below.  Preventive Screenings:  Service Recommendations Previous Testing/Comments   Colorectal Cancer Screening  * Colonoscopy    * Fecal Occult Blood Test (FOBT)/Fecal Immunochemical Test (FIT)  * Fecal DNA/Cologuard Test  * Flexible Sigmoidoscopy Age: 45-75 years old   Colonoscopy: every 10 years (may be performed more frequently if at higher risk)  OR  FOBT/FIT: every 1 year  OR  Cologuard: every 3 years  OR  Sigmoidoscopy: every 5 years  Screening may be recommended earlier than age 45 if at higher risk for colorectal cancer. Also, an individualized decision between you and your healthcare provider will decide whether screening between the ages of 76-85 would be appropriate. Colonoscopy: 07/23/2020  FOBT/FIT: Not on file  Cologuard: Not on file  Sigmoidoscopy: Not on file    Screening Current     Breast Cancer Screening Age: 40+ years old  Frequency: every 1-2 years  Not required if history of left and right mastectomy Mammogram: 12/22/2020    History Breast Cancer   Cervical Cancer Screening Between the ages of  21-29, pap smear recommended once every 3 years.   Between the ages of 30-65, can perform pap smear with HPV co-testing every 5 years.   Recommendations may differ for women with a history of total hysterectomy, cervical cancer, or abnormal pap smears in past. Pap Smear: Not on file    Screening Not Indicated   Hepatitis C Screening Once for adults born between 1945 and 1965  More frequently in patients at high risk for Hepatitis C Hep C Antibody: 06/25/2020    Screening Current   Diabetes Screening 1-2 times per year if you're at risk for diabetes or have pre-diabetes Fasting glucose: No results in last 5 years (No results in last 5 years)  A1C: No results in last 5 years (No results in last 5 years)  Screening Current   Cholesterol Screening Once every 5 years if you don't have a lipid disorder. May order more often based on risk factors. Lipid panel: 06/25/2020    Screening Current     Other Preventive Screenings Covered by Medicare:  Abdominal Aortic Aneurysm (AAA) Screening: covered once if your at risk. You're considered to be at risk if you have a family history of AAA.  Lung Cancer Screening: covers low dose CT scan once per year if you meet all of the following conditions: (1) Age 55-77; (2) No signs or symptoms of lung cancer; (3) Current smoker or have quit smoking within the last 15 years; (4) You have a tobacco smoking history of at least 20 pack years (packs per day multiplied by number of years you smoked); (5) You get a written order from a healthcare provider.  Glaucoma Screening: covered annually if you're considered high risk: (1) You have diabetes OR (2) Family history of glaucoma OR (3)  aged 50 and older OR (4)  American aged 65 and older  Osteoporosis Screening: covered every 2 years if you meet one of the following conditions: (1) You're estrogen deficient and at risk for osteoporosis based off medical history and other findings; (2) Have a vertebral abnormality; (3)  On glucocorticoid therapy for more than 3 months; (4) Have primary hyperparathyroidism; (5) On osteoporosis medications and need to assess response to drug therapy.   Last bone density test (DXA Scan): 02/08/2023.  HIV Screening: covered annually if you're between the age of 15-65. Also covered annually if you are younger than 15 and older than 65 with risk factors for HIV infection. For pregnant patients, it is covered up to 3 times per pregnancy.    Immunizations:  Immunization Recommendations   Influenza Vaccine Annual influenza vaccination during flu season is recommended for all persons aged >= 6 months who do not have contraindications   Pneumococcal Vaccine   * Pneumococcal conjugate vaccine = PCV13 (Prevnar 13), PCV15 (Vaxneuvance), PCV20 (Prevnar 20)  * Pneumococcal polysaccharide vaccine = PPSV23 (Pneumovax) Adults 19-65 yo with certain risk factors or if 65+ yo  If never received any pneumonia vaccine: recommend Prevnar 20 (PCV20)  Give PCV20 if previously received 1 dose of PCV13 or PPSV23   Hepatitis B Vaccine 3 dose series if at intermediate or high risk (ex: diabetes, end stage renal disease, liver disease)   Respiratory syncytial virus (RSV) Vaccine - COVERED BY MEDICARE PART D  * RSVPreF3 (Arexvy) CDC recommends that adults 60 years of age and older may receive a single dose of RSV vaccine using shared clinical decision-making (SCDM)   Tetanus (Td) Vaccine - COST NOT COVERED BY MEDICARE PART B Following completion of primary series, a booster dose should be given every 10 years to maintain immunity against tetanus. Td may also be given as tetanus wound prophylaxis.   Tdap Vaccine - COST NOT COVERED BY MEDICARE PART B Recommended at least once for all adults. For pregnant patients, recommended with each pregnancy.   Shingles Vaccine (Shingrix) - COST NOT COVERED BY MEDICARE PART B  2 shot series recommended in those 19 years and older who have or will have weakened immune systems or those 50 years  and older     Health Maintenance Due:      Topic Date Due   • Colorectal Cancer Screening  07/23/2025   • Hepatitis C Screening  Completed     Immunizations Due:      Topic Date Due   • COVID-19 Vaccine (3 - 2024-25 season) 09/01/2024     Advance Directives   What are advance directives?  Advance directives are legal documents that state your wishes and plans for medical care. These plans are made ahead of time in case you lose your ability to make decisions for yourself. Advance directives can apply to any medical decision, such as the treatments you want, and if you want to donate organs.   What are the types of advance directives?  There are many types of advance directives, and each state has rules about how to use them. You may choose a combination of any of the following:  Living will:  This is a written record of the treatment you want. You can also choose which treatments you do not want, which to limit, and which to stop at a certain time. This includes surgery, medicine, IV fluid, and tube feedings.   Durable power of  for healthcare (DPAHC):  This is a written record that states who you want to make healthcare choices for you when you are unable to make them for yourself. This person, called a proxy, is usually a family member or a friend. You may choose more than 1 proxy.  Do not resuscitate (DNR) order:  A DNR order is used in case your heart stops beating or you stop breathing. It is a request not to have certain forms of treatment, such as CPR. A DNR order may be included in other types of advance directives.  Medical directive:  This covers the care that you want if you are in a coma, near death, or unable to make decisions for yourself. You can list the treatments you want for each condition. Treatment may include pain medicine, surgery, blood transfusions, dialysis, IV or tube feedings, and a ventilator (breathing machine).  Values history:  This document has questions about your views,  beliefs, and how you feel and think about life. This information can help others choose the care that you would choose.  Why are advance directives important?  An advance directive helps you control your care. Although spoken wishes may be used, it is better to have your wishes written down. Spoken wishes can be misunderstood, or not followed. Treatments may be given even if you do not want them. An advance directive may make it easier for your family to make difficult choices about your care.   Urinary Incontinence   Urinary incontinence (UI)  is when you lose control of your bladder. UI develops because your bladder cannot store or empty urine properly. The 3 most common types of UI are stress incontinence, urge incontinence, or both.  Medicines:   May be given to help strengthen your bladder control. Report any side effects of medication to your healthcare provider.  Do pelvic muscle exercises often:  Your pelvic muscles help you stop urinating. Squeeze these muscles tight for 5 seconds, then relax for 5 seconds. Gradually work up to squeezing for 10 seconds. Do 3 sets of 15 repetitions a day, or as directed. This will help strengthen your pelvic muscles and improve bladder control.  Train your bladder:  Go to the bathroom at set times, such as every 2 hours, even if you do not feel the urge to go. You can also try to hold your urine when you feel the urge to go. For example, hold your urine for 5 minutes when you feel the urge to go. As that becomes easier, hold your urine for 10 minutes.   Self-care:   Keep a UI record.  Write down how often you leak urine and how much you leak. Make a note of what you were doing when you leaked urine.  Drink liquids as directed. You may need to limit the amount of liquid you drink to help control your urine leakage. Do not drink any liquid right before you go to bed. Limit or do not have drinks that contain caffeine or alcohol.   Prevent constipation.  Eat a variety of  high-fiber foods. Good examples are high-fiber cereals, beans, vegetables, and whole-grain breads. Walking is the best way to trigger your intestines to have a bowel movement.  Exercise regularly and maintain a healthy weight.  Weight loss and exercise will decrease pressure on your bladder and help you control your leakage.   Use a catheter as directed  to help empty your bladder. A catheter is a tiny, plastic tube that is put into your bladder to drain your urine.   Go to behavior therapy as directed.  Behavior therapy may be used to help you learn to control your urge to urinate.    Weight Management   Why it is important to manage your weight:  Being overweight increases your risk of health conditions such as heart disease, high blood pressure, type 2 diabetes, and certain types of cancer. It can also increase your risk for osteoarthritis, sleep apnea, and other respiratory problems. Aim for a slow, steady weight loss. Even a small amount of weight loss can lower your risk of health problems.  How to lose weight safely:  A safe and healthy way to lose weight is to eat fewer calories and get regular exercise. You can lose up about 1 pound a week by decreasing the number of calories you eat by 500 calories each day.   Healthy meal plan for weight management:  A healthy meal plan includes a variety of foods, contains fewer calories, and helps you stay healthy. A healthy meal plan includes the following:  Eat whole-grain foods more often.  A healthy meal plan should contain fiber. Fiber is the part of grains, fruits, and vegetables that is not broken down by your body. Whole-grain foods are healthy and provide extra fiber in your diet. Some examples of whole-grain foods are whole-wheat breads and pastas, oatmeal, brown rice, and bulgur.  Eat a variety of vegetables every day.  Include dark, leafy greens such as spinach, kale, kaden greens, and mustard greens. Eat yellow and orange vegetables such as carrots, sweet  potatoes, and winter squash.   Eat a variety of fruits every day.  Choose fresh or canned fruit (canned in its own juice or light syrup) instead of juice. Fruit juice has very little or no fiber.  Eat low-fat dairy foods.  Drink fat-free (skim) milk or 1% milk. Eat fat-free yogurt and low-fat cottage cheese. Try low-fat cheeses such as mozzarella and other reduced-fat cheeses.  Choose meat and other protein foods that are low in fat.  Choose beans or other legumes such as split peas or lentils. Choose fish, skinless poultry (chicken or turkey), or lean cuts of red meat (beef or pork). Before you cook meat or poultry, cut off any visible fat.   Use less fat and oil.  Try baking foods instead of frying them. Add less fat, such as margarine, sour cream, regular salad dressing and mayonnaise to foods. Eat fewer high-fat foods. Some examples of high-fat foods include french fries, doughnuts, ice cream, and cakes.  Eat fewer sweets.  Limit foods and drinks that are high in sugar. This includes candy, cookies, regular soda, and sweetened drinks.  Exercise:  Exercise at least 30 minutes per day on most days of the week. Some examples of exercise include walking, biking, dancing, and swimming. You can also fit in more physical activity by taking the stairs instead of the elevator or parking farther away from stores. Ask your healthcare provider about the best exercise plan for you.      © Copyright [employer] 2018 Information is for End User's use only and may not be sold, redistributed or otherwise used for commercial purposes. All illustrations and images included in CareNotes® are the copyrighted property of [employer] or [employer]

## 2025-02-26 NOTE — ASSESSMENT & PLAN NOTE
refill  Orders:    gabapentin (NEURONTIN) 100 mg capsule; Take 1 capsule (100 mg total) by mouth daily at bedtime

## 2025-02-26 NOTE — PROGRESS NOTES
Name: Gabriela Llanos      : 1945      MRN: [id number]  Encounter Provider: Christopher Gaitan PA-C  Encounter Date: 2025   Encounter department: [facility]    Assessment & Plan  Medicare annual wellness visit, subsequent         Subacute maxillary sinusitis  Pt complains of sx including sore throat, post nasal drip, sinus pressure, and sinus and nasal congestion  Sx have been present for 4 week(s) and  waxing and waning  since onset.   Pt has attempted to alleviate their current sx with afrin which has provided some relief.   Pertinent negatives: no chest pain, SOB, difficulty breathing, nausea, vomiting, or diarrhea .   COVID testing negative  Exam is significant for maxillary sinus tenderness, mild pharyngeal erythema without exudates, congested phonation  Based on timeline of symptoms and exam, will cover for antibiotic ideology with doxycycline after discussing risk and benefits of recommending probiotics/yogurt during use  Also Rx NaCl spray for nasal dryness, advised increased rest, increase hydration, otc antihistamine  Follow-up as needed for worsening or nonimprovement patient is in agreement with current plan  Orders:    doxycycline hyclate (VIBRA-TABS) 100 mg tablet; Take 1 tablet (100 mg total) by mouth 2 (two) times a day for 7 days    sodium chloride (OCEAN) 0.65 % nasal spray; 1 spray into each nostril as needed for congestion or rhinitis    Persistent atrial fibrillation (HCC)  Stable controlled at this time    last seen by cardiology out of network, patient is routine follow  Exam revealing/reassuring, heart regular rate and rhythm  Continue metoprolol, continue warfarin  Continue follow-up with cardiology patient recently started on dofetilide and states her symptoms have been controlled    Chronic diastolic heart failure (HCC)  Wt Readings from Last 3 Encounters:   25 76.2 kg (168 lb)   10/02/23 69.4 kg (153 lb)   23 69.4 kg (153 lb)   stable  control at this time  2/13, last seen by cardiology  Denies shortness of breath chest pain difficulty breathing  Lungs CTA, heart regular rate and rhythm, no discernible leg swelling  Continue follow-up with cardiology  Continue medication regimen, recommend sodium and fluid restriction, measurement of daily weight       Primary insomnia  Historical medication taken as needed for insomnia  Patient historically taking 1 mg, PDMP reviewed patient has not filled recently   Decreased prescription 0.5 mg taken only as needed, risks and benefits of medication discussed including sedation, cardiac abnormalities, respiratory depression  I did recommend prior to restarting the prescription patient reach out to cardiologist to ensure that, she is cleared for this medication from their perspective as well  Orders:    LORazepam (ATIVAN) 0.5 mg tablet; Take 1 tablet (0.5 mg total) by mouth daily at bedtime as needed for sleep    Routine health maintenance    Orders:    CBC and differential; Future    Screening for lipid disorders    Orders:    Lipid panel; Future    Screening for diabetes mellitus (DM)    Orders:    Hemoglobin A1C; Future    Encounter for screening mammogram for breast cancer    Orders:    Mammo screening bilateral w 3d and cad; Future    Chronic midline low back pain with right-sided sciatica  refill  Orders:    gabapentin (NEURONTIN) 100 mg capsule; Take 1 capsule (100 mg total) by mouth daily at bedtime    Nasal congestion    Orders:    POCT Rapid Covid Ag    Urinary incontinence, unspecified type  Incontinence screening +  Denies referral or further tx at this time           Preventive health issues were discussed with patient, and age appropriate screening tests were ordered as noted in patient's After Visit Summary. Personalized health advice and appropriate referrals for health education or preventive services given if needed, as noted in patient's After Visit Summary.    History of Present Illness  "    Gabriela Llanos is a 79 y.o. female  presenting for worsening nasal congestion, with persistent sinus symptoms for the last 4 weeks.  She is also reestablishing as she has not been seen in years and is due for her Medicare annual wellness visit.    vaccines, care gaps, HCC, screenings, routine labs, relevant lab work and imaging, reviewed at this visit.     **Note: Portions of the record may have been created with voice recognition software.  Occasional wrong word or \"sound alike\" substitutions may have occurred due to the inherent limitations of voice recognition software.  Please read the chart carefully and recognize, using context, where substitutions have occurred. Please contact for further clarification, when necessary.          Patient Care Team:  Christopher Gaitan PA-C as PCP - General (Family Medicine)    Review of Systems   Constitutional:  Negative for chills and fever.   HENT:  Positive for postnasal drip, sinus pressure, sinus pain and sore throat. Negative for ear pain.    Eyes:  Negative for pain and visual disturbance.   Respiratory:  Negative for cough and shortness of breath.    Cardiovascular:  Negative for chest pain and palpitations.   Gastrointestinal:  Negative for abdominal pain and vomiting.   Genitourinary:  Negative for dysuria and hematuria.   Musculoskeletal:  Negative for arthralgias and back pain.   Skin:  Negative for color change and rash.   Neurological:  Negative for seizures and syncope.   Psychiatric/Behavioral:  Positive for sleep disturbance.    All other systems reviewed and are negative.    Medical History Reviewed by provider this encounter:  Tobacco  Allergies  Meds  Problems  Med Hx  Surg Hx  Fam Hx       Annual Wellness Visit Questionnaire       Health Risk Assessment:   Patient rates overall health as good. Patient feels that their physical health rating is slightly better. Patient is satisfied with their life. Eyesight was rated as same. Hearing was rated as " slightly worse. Patient feels that their emotional and mental health rating is same. Patients states they are never, rarely angry. Patient states they are sometimes unusually tired/fatigued. Pain experienced in the last 7 days has been some. Patient's pain rating has been 5/10.     Depression Screening:   PHQ-2 Score: 0      Fall Risk Screening:   In the past year, patient has experienced: no history of falling in past year      Urinary Incontinence Screening:   Patient has leaked urine accidently in the last six months.     Home Safety:  Patient has trouble with stairs inside or outside of their home. Patient has working smoke alarms and has working carbon monoxide detector. Home safety hazards include: none.     Nutrition:   Current diet is Regular.     Medications:   Patient is currently taking over-the-counter supplements. OTC medications include: see medication list. Patient is able to manage medications.     Activities of Daily Living (ADLs)/Instrumental Activities of Daily Living (IADLs):   Walk and transfer into and out of bed and chair?: Yes  Dress and groom yourself?: Yes    Bathe or shower yourself?: Yes    Feed yourself? Yes  Do your laundry/housekeeping?: Yes  Manage your money, pay your bills and track your expenses?: Yes  Make your own meals?: Yes    Do your own shopping?: Yes    Previous Hospitalizations:   Any hospitalizations or ED visits within the last 12 months?: Yes    How many hospitalizations have you had in the last year?: 1-2    PREVENTIVE SCREENINGS      Cardiovascular Screening:    General: Screening Current      Diabetes Screening:     General: Screening Current      Colorectal Cancer Screening:     General: Screening Current      Breast Cancer Screening:     General: History Breast Cancer      Cervical Cancer Screening:    General: Screening Not Indicated      Osteoporosis Screening:    General: Screening Not Indicated and History Osteoporosis      Lung Cancer Screening:     General:  "Screening Not Indicated      Hepatitis C Screening:    General: Screening Current    Screening, Brief Intervention, and Referral to Treatment (SBIRT)     Screening  Typical number of drinks in a day: 0    Single Item Drug Screening:  How often have you used an illegal drug (including marijuana) or a prescription medication for non-medical reasons in the past year? never    Single Item Drug Screen Score: 0  Interpretation: Negative screen for possible drug use disorder    Social Drivers of Health     Financial Resource Strain: Low Risk  (1/15/2025)    Received from [facility]    Overall Financial Resource Strain (CARDIA)     Difficulty of Paying Living Expenses: Not very hard   Food Insecurity: No Food Insecurity (2/26/2025)    Hunger Vital Sign     Worried About Running Out of Food in the Last Year: Never true     Ran Out of Food in the Last Year: Never true   Transportation Needs: No Transportation Needs (2/26/2025)    PRAPARE - Transportation     Lack of Transportation (Medical): No     Lack of Transportation (Non-Medical): No   Housing Stability: Low Risk  (2/26/2025)    Housing Stability Vital Sign     Unable to Pay for Housing in the Last Year: No     Number of Times Moved in the Last Year: 0     Homeless in the Last Year: No   Utilities: Not At Risk (2/26/2025)    [facility]     Threatened with loss of utilities: No     No results found.    Objective   /68 (BP Location: Left arm, Patient Position: Sitting, Cuff Size: Large)   Pulse 78   Temp 98.9 °F (37.2 °C)   Ht 5' 2\" (1.575 m)   Wt 76.2 kg (168 lb)   SpO2 95%   BMI 30.73 kg/m²     Physical Exam  Vitals and nursing note reviewed.   Constitutional:       General: She is not in acute distress.     Appearance: She is well-developed.   HENT:      Head: Normocephalic and atraumatic.      Right Ear: Tympanic membrane normal.      Left Ear: Tympanic membrane normal.      Nose: Congestion present.      Right Sinus: Maxillary sinus " tenderness present.      Left Sinus: Maxillary sinus tenderness present.      Mouth/Throat:      Mouth: Mucous membranes are moist.      Pharynx: Oropharynx is clear. Posterior oropharyngeal erythema present.   Eyes:      Conjunctiva/sclera: Conjunctivae normal.      Pupils: Pupils are equal, round, and reactive to light.   Cardiovascular:      Rate and Rhythm: Normal rate and regular rhythm.      Heart sounds: No murmur heard.  Pulmonary:      Effort: Pulmonary effort is normal. No respiratory distress.      Breath sounds: Normal breath sounds. No wheezing.   Abdominal:      General: Abdomen is flat.   Musculoskeletal:         General: No swelling.      Cervical back: Neck supple.   Skin:     General: Skin is warm and dry.   Neurological:      Mental Status: She is alert and oriented to person, place, and time.   Psychiatric:         Mood and Affect: Mood normal.

## 2025-03-13 ENCOUNTER — APPOINTMENT (OUTPATIENT)
Dept: LAB | Facility: CLINIC | Age: 80
End: 2025-03-13
Payer: COMMERCIAL

## 2025-03-13 ENCOUNTER — OFFICE VISIT (OUTPATIENT)
Dept: FAMILY MEDICINE CLINIC | Facility: CLINIC | Age: 80
End: 2025-03-13
Payer: COMMERCIAL

## 2025-03-13 VITALS
WEIGHT: 172.2 LBS | SYSTOLIC BLOOD PRESSURE: 136 MMHG | HEART RATE: 92 BPM | HEIGHT: 62 IN | OXYGEN SATURATION: 98 % | DIASTOLIC BLOOD PRESSURE: 82 MMHG | BODY MASS INDEX: 31.69 KG/M2 | TEMPERATURE: 97.5 F

## 2025-03-13 DIAGNOSIS — R20.2 PARESTHESIA: ICD-10-CM

## 2025-03-13 DIAGNOSIS — Z00.00 ROUTINE HEALTH MAINTENANCE: ICD-10-CM

## 2025-03-13 DIAGNOSIS — Z13.220 SCREENING FOR LIPID DISORDERS: ICD-10-CM

## 2025-03-13 DIAGNOSIS — R52 GENERALIZED BODY ACHES: Primary | ICD-10-CM

## 2025-03-13 DIAGNOSIS — N28.1 RENAL CYST: ICD-10-CM

## 2025-03-13 DIAGNOSIS — I48.91 ATRIAL FIBRILLATION, UNSPECIFIED TYPE (HCC): ICD-10-CM

## 2025-03-13 DIAGNOSIS — Z13.1 SCREENING FOR DIABETES MELLITUS (DM): ICD-10-CM

## 2025-03-13 LAB
BASOPHILS # BLD AUTO: 0.11 THOUSANDS/ÂΜL (ref 0–0.1)
BASOPHILS NFR BLD AUTO: 1 % (ref 0–1)
CHOLEST SERPL-MCNC: 123 MG/DL (ref ?–200)
EOSINOPHIL # BLD AUTO: 0.16 THOUSAND/ÂΜL (ref 0–0.61)
EOSINOPHIL NFR BLD AUTO: 2 % (ref 0–6)
ERYTHROCYTE [DISTWIDTH] IN BLOOD BY AUTOMATED COUNT: 13.1 % (ref 11.6–15.1)
EST. AVERAGE GLUCOSE BLD GHB EST-MCNC: 120 MG/DL
HBA1C MFR BLD: 5.8 %
HCT VFR BLD AUTO: 40.7 % (ref 34.8–46.1)
HDLC SERPL-MCNC: 67 MG/DL
HGB BLD-MCNC: 13.2 G/DL (ref 11.5–15.4)
IMM GRANULOCYTES # BLD AUTO: 0.04 THOUSAND/UL (ref 0–0.2)
IMM GRANULOCYTES NFR BLD AUTO: 1 % (ref 0–2)
INR PPP: 3.71 (ref 0.85–1.19)
LDLC SERPL CALC-MCNC: 35 MG/DL (ref 0–100)
LYMPHOCYTES # BLD AUTO: 1.88 THOUSANDS/ÂΜL (ref 0.6–4.47)
LYMPHOCYTES NFR BLD AUTO: 24 % (ref 14–44)
MCH RBC QN AUTO: 32.2 PG (ref 26.8–34.3)
MCHC RBC AUTO-ENTMCNC: 32.4 G/DL (ref 31.4–37.4)
MCV RBC AUTO: 99 FL (ref 82–98)
MONOCYTES # BLD AUTO: 0.6 THOUSAND/ÂΜL (ref 0.17–1.22)
MONOCYTES NFR BLD AUTO: 8 % (ref 4–12)
NEUTROPHILS # BLD AUTO: 5.13 THOUSANDS/ÂΜL (ref 1.85–7.62)
NEUTS SEG NFR BLD AUTO: 64 % (ref 43–75)
NONHDLC SERPL-MCNC: 56 MG/DL
NRBC BLD AUTO-RTO: 0 /100 WBCS
PLATELET # BLD AUTO: 330 THOUSANDS/UL (ref 149–390)
PMV BLD AUTO: 10.6 FL (ref 8.9–12.7)
PROTHROMBIN TIME: 36.2 SECONDS (ref 12.3–15)
RBC # BLD AUTO: 4.1 MILLION/UL (ref 3.81–5.12)
TRIGL SERPL-MCNC: 106 MG/DL (ref ?–150)
WBC # BLD AUTO: 7.92 THOUSAND/UL (ref 4.31–10.16)

## 2025-03-13 PROCEDURE — 36415 COLL VENOUS BLD VENIPUNCTURE: CPT

## 2025-03-13 PROCEDURE — 85025 COMPLETE CBC W/AUTO DIFF WBC: CPT

## 2025-03-13 PROCEDURE — 83036 HEMOGLOBIN GLYCOSYLATED A1C: CPT

## 2025-03-13 PROCEDURE — G2211 COMPLEX E/M VISIT ADD ON: HCPCS

## 2025-03-13 PROCEDURE — 80061 LIPID PANEL: CPT

## 2025-03-13 PROCEDURE — 85610 PROTHROMBIN TIME: CPT

## 2025-03-13 PROCEDURE — 99213 OFFICE O/P EST LOW 20 MIN: CPT

## 2025-03-13 RX ORDER — GABAPENTIN 100 MG/1
100 CAPSULE ORAL 3 TIMES DAILY
Qty: 90 CAPSULE | Refills: 1 | Status: SHIPPED | OUTPATIENT
Start: 2025-03-13

## 2025-03-13 RX ORDER — DIGOXIN 250 MCG
1 TABLET ORAL DAILY
COMMUNITY
Start: 2025-02-26

## 2025-03-13 NOTE — ASSESSMENT & PLAN NOTE
Patient history of renal cysts, per patient was scheduled to have routine monitoring with repeat ultrasound but was never scheduled  Ultrasound of the kidney and bladder ordered, will refer to nephrology pending results  Orders:    US kidney and bladder; Future

## 2025-03-13 NOTE — PROGRESS NOTES
Name: Gabriela Llanos      : 1945      MRN: [id number]  Encounter Provider: Christopher Gaitan PA-C  Encounter Date: 3/13/2025   Encounter department: [facility]    Assessment & Plan  Generalized body aches  Body aches located in multiple sites for 2 month(s), bilateral lower extremity and wrist area  She notes some pain and paresthesia like sensations to light palpation   Hx of fibromyalgia Dx as well 2006  Described as pain/burning   Pt has attempted to alleviate the pain with tried OTC tylenol PM which has caused little relief .   does not have a Hx of trauma or injury related to current sx.  Current pain is rated as a 5/10 pain.  Pertinent negatives at this time include no fever or systemic symptoms.   Exam is otherwise unremarkable, there are varicosities noted to the lower extremity which patient states are chronic, no evidence of infection, CTA lungs, heart regular rate and rhythm  Discussed with patient that we can try symptomatic alleviation with gabapentin, patient is already on 100 mg nightly and tolerating well so we will increase this to 100 mg 3 times daily increased to 200 mg nightly  Discussed that if not relief in 2 weeks Cymbalta may be an option but patient is already on Effexor so we will need to also discontinue current dosing if patient would like to start, also will consider vascular etiologies if not improvement with gabapentin and conservative measures  Follow-up 2 weeks       Paresthesia  As above, pain described as paresthesias and sharp sensations  Orders:    gabapentin (NEURONTIN) 100 mg capsule; Take 1 capsule (100 mg total) by mouth 3 (three) times a day Take 100 mg during the day and 200 mg at night. May increase to 200 mg three times a day as needed. Caution for sedation.    Renal cyst  Patient history of renal cysts, per patient was scheduled to have routine monitoring with repeat ultrasound but was never scheduled  Ultrasound of the kidney and bladder  "ordered, will refer to nephrology pending results  Orders:    US kidney and bladder; Future         History of Present Illness     Gabriela Llanos is a 79 y.o. female  presenting for body aches generalized to the lower extremity and wrist area.  She does have a prior history of fibromyalgia and feels this may be contributory.        **Note: Portions of the record may have been created with voice recognition software.  Occasional wrong word or \"sound alike\" substitutions may have occurred due to the inherent limitations of voice recognition software.  Please read the chart carefully and recognize, using context, where substitutions have occurred. Please contact for further clarification, when necessary.         Review of Systems   Constitutional:  Negative for chills and fever.   HENT:  Negative for ear pain and sore throat.    Eyes:  Negative for pain and visual disturbance.   Respiratory:  Negative for cough and shortness of breath.    Cardiovascular:  Negative for chest pain and palpitations.   Gastrointestinal:  Negative for abdominal pain and vomiting.   Genitourinary:  Negative for dysuria and hematuria.   Musculoskeletal:  Positive for arthralgias and myalgias (Lower extremity). Negative for back pain.   Skin:  Negative for color change and rash.   Neurological:  Negative for seizures and syncope.   All other systems reviewed and are negative.    Past Medical History:   Diagnosis Date    Acute blood loss anemia 6/5/2020    Arm contusion, right 2/2 Coumadin toxicity 6/4/2020    Arm swelling 6/4/2020    Atrial fibrillation (HCC)     Bilateral impacted cerumen 6/18/2020    Breast cancer (HCC) 1989    left    Diverticulitis-recent 6/5/2020    History of chemotherapy 1989    left breast    Irregular heart beat     Medicare annual wellness visit, subsequent 6/18/2020    Tick bite with subsequent removal of tick 5/28/2019    Last Assessment & Plan:  On doxy so no need for lymes testing or additional antibiotic     Past " Surgical History:   Procedure Laterality Date    APPENDECTOMY      AUGMENTATION MAMMAPLASTY Right 1989    implant    CARDIAC ELECTROPHYSIOLOGY STUDY, ESOPHAGEAL      CARDIAC VALVE REPLACEMENT      MV    COLONOSCOPY      HYSTERECTOMY  2002    MASTECTOMY Left 1989    OOPHORECTOMY Bilateral 2002    TONSILLECTOMY       Family History   Problem Relation Age of Onset    Diabetes Mother     Cancer Mother     Breast cancer Mother 37    Colon cancer Father     Colon cancer Paternal Grandmother     Colon cancer Paternal Grandfather     No Known Problems Daughter     No Known Problems Daughter     Breast cancer Maternal Aunt 45    Breast cancer Maternal Aunt         age unknown    Breast cancer Maternal Aunt         age unknown    Breast cancer Maternal Aunt         age unknown    No Known Problems Paternal Aunt      Social History     Tobacco Use    Smoking status: Never     Passive exposure: Never    Smokeless tobacco: Never   Vaping Use    Vaping status: Never Used   Substance and Sexual Activity    Alcohol use: Yes     Comment: occasionally    Drug use: Never    Sexual activity: Not on file     Current Outpatient Medications on File Prior to Visit   Medication Sig    digoxin (LANOXIN) 0.25 mg tablet Take 1 tablet by mouth in the morning    acetaminophen (TYLENOL) 325 mg tablet Take 2 tablets (650 mg total) by mouth every 6 (six) hours as needed for mild pain, headaches or fever (Patient taking differently: Take 1,000 mg by mouth daily ALSO 500MG PRN)    alendronate (FOSAMAX) 70 mg tablet take 1 tablet by mouth every week    diclofenac sodium (VOLTAREN) 1 % Apply 2 g topically 4 (four) times a day (Patient taking differently: Apply 2 g topically 4 (four) times a day PRN)    digoxin (LANOXIN) 0.125 mg tablet Take 125 mcg by mouth daily    diltiazem (Cardizem) 120 MG tablet Take 120 mg by mouth in the morning    dofetilide (TIKOSYN) 250 mcg capsule Take 1 capsule by mouth 3 (three) times a day    estradiol (ESTRACE) 0.1 mg/g  vaginal cream Apply a pea-sized amount to the vaginal opening 3 times a week (Mon-Wed-Fri)    gabapentin (NEURONTIN) 100 mg capsule Take 1 capsule (100 mg total) by mouth daily at bedtime    irbesartan (AVAPRO) 150 mg tablet Take 1 tablet (150 mg total) by mouth daily at bedtime    levothyroxine 88 mcg tablet Take 88 mcg by mouth daily    lidocaine (Lidoderm) 5 % Apply 1 patch topically over 12 hours daily Remove & Discard patch within 12 hours or as directed by MD    LORazepam (ATIVAN) 0.5 mg tablet Take 1 tablet (0.5 mg total) by mouth daily at bedtime as needed for sleep    magnesium Oxide (MAG-OX) 400 mg TABS Take 1 tablet by mouth daily    melatonin 1 mg Take 20 mg by mouth daily at bedtime as needed    metoprolol succinate (TOPROL-XL) 50 mg 24 hr tablet 50 mg 2 (two) times a day     Multiple Vitamin (MULTIVITAMIN PO) Take by mouth in the morning PATCH    pantoprazole (PROTONIX) 40 mg tablet take 1 tablet by mouth twice a day    RA LAXATIVE powder take 17GM (DISSOLVED IN WATER) by mouth once daily if needed for constipation    rosuvastatin (CRESTOR) 10 MG tablet Take 1 tablet by mouth in the morning    sodium chloride (OCEAN) 0.65 % nasal spray 1 spray into each nostril as needed for congestion or rhinitis    traZODone (DESYREL) 50 mg tablet Take 50 mg by mouth daily at bedtime    venlafaxine (EFFEXOR) 75 mg tablet Take 75 mg by mouth daily     warfarin (COUMADIN) 5 mg tablet Take 1 tablet by mouth in the morning     Allergies   Allergen Reactions    Penicillins Anaphylaxis and Other (See Comments)     Other reaction(s): Anaphylaxis      Iodides      Other reaction(s): Rash      Amiodarone Other (See Comments)    Clindamycin Other (See Comments) and GI Intolerance    Iodinated Contrast Media Other (See Comments)    Morphine Rash     Immunization History   Administered Date(s) Administered    COVID-19 PFIZER VACCINE 0.3 ML IM 04/08/2021, 04/29/2021    Influenza Split 11/08/2013    Influenza Split High Dose  "Preservative Free IM 10/21/2016, 10/05/2017, 10/02/2018    Pneumococcal Conjugate 13-Valent 04/27/2016    Pneumococcal Polysaccharide PPV23 09/03/2005, 11/08/2013, 10/08/2019    Zoster 06/24/2016    influenza, trivalent, adjuvanted 02/03/2025     Objective   /98   Pulse 92   Temp 97.5 °F (36.4 °C)   Ht 5' 2\" (1.575 m)   Wt 78.1 kg (172 lb 3.2 oz)   SpO2 98%   BMI 31.50 kg/m²     Physical Exam  Vitals and nursing note reviewed.   Constitutional:       General: She is not in acute distress.     Appearance: She is well-developed.   HENT:      Head: Normocephalic and atraumatic.      Nose: Nose normal.      Mouth/Throat:      Pharynx: Oropharynx is clear.   Eyes:      Conjunctiva/sclera: Conjunctivae normal.      Pupils: Pupils are equal, round, and reactive to light.   Cardiovascular:      Rate and Rhythm: Normal rate and regular rhythm.      Heart sounds: No murmur heard.  Pulmonary:      Effort: Pulmonary effort is normal. No respiratory distress.      Breath sounds: Normal breath sounds. No wheezing.   Abdominal:      General: Abdomen is flat.   Musculoskeletal:         General: Tenderness (Lower extremity paresthesia-like to light palpation) present. No swelling or deformity (Varicosities of the lower extremity bilaterally).      Cervical back: Neck supple.      Right lower leg: No edema.      Left lower leg: No edema.   Skin:     General: Skin is warm and dry.   Neurological:      Mental Status: She is alert and oriented to person, place, and time.   Psychiatric:         Mood and Affect: Mood normal.         "

## 2025-03-17 ENCOUNTER — RESULTS FOLLOW-UP (OUTPATIENT)
Dept: FAMILY MEDICINE CLINIC | Facility: CLINIC | Age: 80
End: 2025-03-17

## 2025-03-19 NOTE — TELEPHONE ENCOUNTER
Reviewed lab results with patient:    Labs overall stable. Diabetes screening shows mild elevation in blood sugar so recommend low refined sugar and carb diet. Blood levels all stable, no evidence of infection, cholesterol stable.     No further questions from patient.

## 2025-03-24 ENCOUNTER — OFFICE VISIT (OUTPATIENT)
Dept: FAMILY MEDICINE CLINIC | Facility: CLINIC | Age: 80
End: 2025-03-24
Payer: COMMERCIAL

## 2025-03-24 ENCOUNTER — APPOINTMENT (OUTPATIENT)
Dept: LAB | Facility: CLINIC | Age: 80
End: 2025-03-24
Payer: COMMERCIAL

## 2025-03-24 VITALS
WEIGHT: 171.4 LBS | DIASTOLIC BLOOD PRESSURE: 76 MMHG | OXYGEN SATURATION: 97 % | TEMPERATURE: 98.7 F | HEIGHT: 62 IN | BODY MASS INDEX: 31.54 KG/M2 | HEART RATE: 82 BPM | SYSTOLIC BLOOD PRESSURE: 128 MMHG

## 2025-03-24 DIAGNOSIS — F51.01 PRIMARY INSOMNIA: ICD-10-CM

## 2025-03-24 DIAGNOSIS — J02.9 SORE THROAT: ICD-10-CM

## 2025-03-24 DIAGNOSIS — Z79.899 LONG TERM USE OF DRUG: ICD-10-CM

## 2025-03-24 DIAGNOSIS — U07.1 COVID-19: Primary | ICD-10-CM

## 2025-03-24 DIAGNOSIS — R05.1 ACUTE COUGH: ICD-10-CM

## 2025-03-24 DIAGNOSIS — I48.91 ATRIAL FIBRILLATION, UNSPECIFIED TYPE (HCC): ICD-10-CM

## 2025-03-24 LAB
ALBUMIN SERPL BCG-MCNC: 4.3 G/DL (ref 3.5–5)
ALP SERPL-CCNC: 96 U/L (ref 34–104)
ALT SERPL W P-5'-P-CCNC: 14 U/L (ref 7–52)
AST SERPL W P-5'-P-CCNC: 23 U/L (ref 13–39)
BILIRUB DIRECT SERPL-MCNC: 0.09 MG/DL (ref 0–0.2)
BILIRUB SERPL-MCNC: 0.57 MG/DL (ref 0.2–1)
INR PPP: 2.2 (ref 0.85–1.19)
PROT SERPL-MCNC: 7.2 G/DL (ref 6.4–8.4)
PROTHROMBIN TIME: 24.4 SECONDS (ref 12.3–15)
S PYO AG THROAT QL: NEGATIVE
SARS-COV-2 AG UPPER RESP QL IA: POSITIVE
SL AMB POCT RAPID FLU A: NORMAL
SL AMB POCT RAPID FLU B: NORMAL
VALID CONTROL: ABNORMAL

## 2025-03-24 PROCEDURE — 87804 INFLUENZA ASSAY W/OPTIC: CPT

## 2025-03-24 PROCEDURE — 85610 PROTHROMBIN TIME: CPT

## 2025-03-24 PROCEDURE — 99213 OFFICE O/P EST LOW 20 MIN: CPT

## 2025-03-24 PROCEDURE — G2211 COMPLEX E/M VISIT ADD ON: HCPCS

## 2025-03-24 PROCEDURE — 87811 SARS-COV-2 COVID19 W/OPTIC: CPT

## 2025-03-24 PROCEDURE — 87880 STREP A ASSAY W/OPTIC: CPT

## 2025-03-24 PROCEDURE — 36415 COLL VENOUS BLD VENIPUNCTURE: CPT

## 2025-03-24 PROCEDURE — 80076 HEPATIC FUNCTION PANEL: CPT

## 2025-03-24 RX ORDER — BENZONATATE 200 MG/1
200 CAPSULE ORAL 3 TIMES DAILY PRN
Qty: 30 CAPSULE | Refills: 0 | Status: SHIPPED | OUTPATIENT
Start: 2025-03-24 | End: 2025-04-03

## 2025-03-24 RX ORDER — ALBUTEROL SULFATE 90 UG/1
2 INHALANT RESPIRATORY (INHALATION) EVERY 6 HOURS PRN
Qty: 6.7 G | Refills: 0 | Status: SHIPPED | OUTPATIENT
Start: 2025-03-24 | End: 2025-03-24

## 2025-03-24 RX ORDER — ALBUTEROL SULFATE 90 UG/1
2 INHALANT RESPIRATORY (INHALATION) EVERY 6 HOURS PRN
Qty: 18 G | Refills: 1 | Status: SHIPPED | OUTPATIENT
Start: 2025-03-24

## 2025-03-24 NOTE — PROGRESS NOTES
Name: Gabriela Llanos      : 1945      MRN: [id number]  Encounter Provider: Christopher Gaitan PA-C  Encounter Date: 3/24/2025   Encounter department: [facility]    Assessment & Plan  COVID-19  Pt complains of sx including dry cough, productive cough, fatigue, sore throat, rhinorrhea , and post nasal drip  Sx have been present for 7 day(s) and is better since onset.   Pt has not attempted to alleviate their sx at this time   Pertinent negatives: no chest pain, SOB, or difficulty breathing.   Exam reveals mild wheeze most prominent in the middle lung field area, patient also appears fatigued, otherwise unremarkable, vital stable afebrile  Patient has tested positive for COVID-19 in office, flu and strep negative  Discussed antiviral medications such as Paxlovid, discussed with patient that she is outside the greatest efficacy window of 5 days of symptoms as well as her warfarin therapy, at this time I would recommend against Paxlovid and patient is agreeable  We will continue supportive care with Tessalon Perles and albuterol after discussing risks and benefits of both medications, advised increase hydration, rest, Coricidin  Patient have a low threshold to following up if shortness of breath, chest pain, difficulty breathing worsens, if any of the symptoms become severe patient is to report to the ED and she is in agreement with current plan  Orders:    benzonatate (TESSALON) 200 MG capsule; Take 1 capsule (200 mg total) by mouth 3 (three) times a day as needed for cough for up to 10 days    albuterol (Ventolin HFA) 90 mcg/act inhaler; Inhale 2 puffs every 6 (six) hours as needed for wheezing    Sore throat    Orders:    POCT Rapid Covid Ag    POCT rapid flu A and B    POCT rapid ANTIGEN strepA    Acute cough    Orders:    albuterol (Ventolin HFA) 90 mcg/act inhaler; Inhale 2 puffs every 6 (six) hours as needed for wheezing    Long term use of drug    Orders:    Hepatic function panel;  "Future         History of Present Illness     Gabriela Llanos is a 79 y.o. female  presenting for concern of continued URI sx. she has tested positive for COVID-19 in office.    relevant lab work and imaging, reviewed at this visit    **Note: Portions of the record may have been created with voice recognition software.  Occasional wrong word or \"sound alike\" substitutions may have occurred due to the inherent limitations of voice recognition software.  Please read the chart carefully and recognize, using context, where substitutions have occurred. Please contact for further clarification, when necessary.         Review of Systems   Constitutional:  Positive for fatigue. Negative for chills and fever.   HENT:  Positive for congestion and sore throat. Negative for ear pain.    Eyes:  Negative for pain and visual disturbance.   Respiratory:  Positive for chest tightness. Negative for cough and shortness of breath.    Cardiovascular:  Negative for chest pain and palpitations.   Gastrointestinal:  Negative for abdominal pain and vomiting.   Genitourinary:  Negative for dysuria and hematuria.   Musculoskeletal:  Negative for arthralgias and back pain.   Skin:  Negative for color change and rash.   Neurological:  Negative for seizures and syncope.   All other systems reviewed and are negative.    Past Medical History:   Diagnosis Date    Acute blood loss anemia 6/5/2020    Arm contusion, right 2/2 Coumadin toxicity 6/4/2020    Arm swelling 6/4/2020    Atrial fibrillation (HCC)     Bilateral impacted cerumen 6/18/2020    Breast cancer (HCC) 1989    left    Diverticulitis-recent 6/5/2020    History of chemotherapy 1989    left breast    Irregular heart beat     Medicare annual wellness visit, subsequent 6/18/2020    Tick bite with subsequent removal of tick 5/28/2019    Last Assessment & Plan:  On doxy so no need for lymes testing or additional antibiotic     Past Surgical History:   Procedure Laterality Date    APPENDECTOMY   "    AUGMENTATION MAMMAPLASTY Right 1989    implant    CARDIAC ELECTROPHYSIOLOGY STUDY, ESOPHAGEAL      CARDIAC VALVE REPLACEMENT      MV    COLONOSCOPY      HYSTERECTOMY  2002    MASTECTOMY Left 1989    OOPHORECTOMY Bilateral 2002    TONSILLECTOMY       Family History   Problem Relation Age of Onset    Diabetes Mother     Cancer Mother     Breast cancer Mother 37    Colon cancer Father     Colon cancer Paternal Grandmother     Colon cancer Paternal Grandfather     No Known Problems Daughter     No Known Problems Daughter     Breast cancer Maternal Aunt 45    Breast cancer Maternal Aunt         age unknown    Breast cancer Maternal Aunt         age unknown    Breast cancer Maternal Aunt         age unknown    No Known Problems Paternal Aunt      Social History     Tobacco Use    Smoking status: Never     Passive exposure: Never    Smokeless tobacco: Never   Vaping Use    Vaping status: Never Used   Substance and Sexual Activity    Alcohol use: Yes     Comment: occasionally    Drug use: Never    Sexual activity: Not on file     Current Outpatient Medications on File Prior to Visit   Medication Sig    acetaminophen (TYLENOL) 325 mg tablet Take 2 tablets (650 mg total) by mouth every 6 (six) hours as needed for mild pain, headaches or fever (Patient taking differently: Take 1,000 mg by mouth daily ALSO 500MG PRN)    alendronate (FOSAMAX) 70 mg tablet take 1 tablet by mouth every week    diclofenac sodium (VOLTAREN) 1 % Apply 2 g topically 4 (four) times a day (Patient taking differently: Apply 2 g topically 4 (four) times a day PRN)    digoxin (LANOXIN) 0.125 mg tablet Take 125 mcg by mouth daily    digoxin (LANOXIN) 0.25 mg tablet Take 1 tablet by mouth in the morning    diltiazem (Cardizem) 120 MG tablet Take 120 mg by mouth in the morning    dofetilide (TIKOSYN) 250 mcg capsule Take 1 capsule by mouth 3 (three) times a day    estradiol (ESTRACE) 0.1 mg/g vaginal cream Apply a pea-sized amount to the vaginal opening 3  times a week (Mon-Wed-Fri)    gabapentin (NEURONTIN) 100 mg capsule Take 1 capsule (100 mg total) by mouth 3 (three) times a day Take 100 mg during the day and 200 mg at night. May increase to 200 mg three times a day as needed. Caution for sedation.    irbesartan (AVAPRO) 150 mg tablet Take 1 tablet (150 mg total) by mouth daily at bedtime    levothyroxine 88 mcg tablet Take 88 mcg by mouth daily    lidocaine (Lidoderm) 5 % Apply 1 patch topically over 12 hours daily Remove & Discard patch within 12 hours or as directed by MD    LORazepam (ATIVAN) 0.5 mg tablet Take 1 tablet (0.5 mg total) by mouth daily at bedtime as needed for sleep    magnesium Oxide (MAG-OX) 400 mg TABS Take 1 tablet by mouth daily    melatonin 1 mg Take 20 mg by mouth daily at bedtime as needed    metoprolol succinate (TOPROL-XL) 50 mg 24 hr tablet 50 mg 2 (two) times a day     Multiple Vitamin (MULTIVITAMIN PO) Take by mouth in the morning PATCH    pantoprazole (PROTONIX) 40 mg tablet take 1 tablet by mouth twice a day    RA LAXATIVE powder take 17GM (DISSOLVED IN WATER) by mouth once daily if needed for constipation    rosuvastatin (CRESTOR) 10 MG tablet Take 1 tablet by mouth in the morning    sodium chloride (OCEAN) 0.65 % nasal spray 1 spray into each nostril as needed for congestion or rhinitis    traZODone (DESYREL) 50 mg tablet Take 50 mg by mouth daily at bedtime    venlafaxine (EFFEXOR) 75 mg tablet Take 75 mg by mouth daily     warfarin (COUMADIN) 5 mg tablet Take 1 tablet by mouth in the morning     Allergies   Allergen Reactions    Penicillins Anaphylaxis and Other (See Comments)     Other reaction(s): Anaphylaxis      Iodides      Other reaction(s): Rash      Amiodarone Other (See Comments)    Clindamycin Other (See Comments) and GI Intolerance    Iodinated Contrast Media Other (See Comments)    Morphine Rash     Immunization History   Administered Date(s) Administered    COVID-19 PFIZER VACCINE 0.3 ML IM 04/08/2021, 04/29/2021  "   Influenza Split 11/08/2013    Influenza Split High Dose Preservative Free IM 10/21/2016, 10/05/2017, 10/02/2018    Pneumococcal Conjugate 13-Valent 04/27/2016    Pneumococcal Polysaccharide PPV23 09/03/2005, 11/08/2013, 10/08/2019    Zoster 06/24/2016    influenza, trivalent, adjuvanted 02/03/2025     Objective   /76   Pulse 82   Temp 98.7 °F (37.1 °C)   Ht 5' 2\" (1.575 m)   Wt 77.7 kg (171 lb 6.4 oz)   SpO2 97%   BMI 31.35 kg/m²     Physical Exam  Vitals and nursing note reviewed.   Constitutional:       General: She is not in acute distress.     Appearance: She is well-developed.   HENT:      Head: Normocephalic and atraumatic.      Right Ear: Tympanic membrane normal.      Left Ear: Tympanic membrane normal.      Nose: Congestion present. No rhinorrhea.      Mouth/Throat:      Mouth: Mucous membranes are moist.      Pharynx: Oropharynx is clear. No oropharyngeal exudate or posterior oropharyngeal erythema.   Eyes:      Conjunctiva/sclera: Conjunctivae normal.      Pupils: Pupils are equal, round, and reactive to light.   Cardiovascular:      Rate and Rhythm: Normal rate and regular rhythm.      Heart sounds: Normal heart sounds. No murmur heard.  Pulmonary:      Effort: Pulmonary effort is normal. No respiratory distress.      Breath sounds: No stridor. Wheezing present. No rhonchi or rales.   Abdominal:      General: Abdomen is flat.   Musculoskeletal:         General: No swelling.      Cervical back: Neck supple.   Skin:     General: Skin is warm and dry.   Neurological:      Mental Status: She is alert and oriented to person, place, and time.   Psychiatric:         Mood and Affect: Mood normal.         "

## 2025-03-25 ENCOUNTER — RESULTS FOLLOW-UP (OUTPATIENT)
Dept: FAMILY MEDICINE CLINIC | Facility: CLINIC | Age: 80
End: 2025-03-25

## 2025-03-25 RX ORDER — LORAZEPAM 0.5 MG/1
0.5 TABLET ORAL
Qty: 20 TABLET | Refills: 0 | Status: SHIPPED | OUTPATIENT
Start: 2025-03-25

## 2025-03-26 ENCOUNTER — TELEPHONE (OUTPATIENT)
Dept: FAMILY MEDICINE CLINIC | Facility: CLINIC | Age: 80
End: 2025-03-26

## 2025-04-08 ENCOUNTER — APPOINTMENT (OUTPATIENT)
Dept: LAB | Facility: CLINIC | Age: 80
End: 2025-04-08
Payer: COMMERCIAL

## 2025-04-08 DIAGNOSIS — I48.91 ATRIAL FIBRILLATION, UNSPECIFIED TYPE (HCC): ICD-10-CM

## 2025-04-08 LAB
INR PPP: 2.68 (ref 0.85–1.19)
PROTHROMBIN TIME: 28.4 SECONDS (ref 12.3–15)

## 2025-04-08 PROCEDURE — 85610 PROTHROMBIN TIME: CPT

## 2025-04-08 PROCEDURE — 36415 COLL VENOUS BLD VENIPUNCTURE: CPT

## 2025-05-02 DIAGNOSIS — F51.01 PRIMARY INSOMNIA: ICD-10-CM

## 2025-05-05 RX ORDER — LORAZEPAM 0.5 MG/1
0.5 TABLET ORAL
Qty: 20 TABLET | Refills: 0 | Status: SHIPPED | OUTPATIENT
Start: 2025-05-05

## 2025-05-07 ENCOUNTER — TELEPHONE (OUTPATIENT)
Age: 80
End: 2025-05-07

## 2025-05-12 ENCOUNTER — TELEPHONE (OUTPATIENT)
Age: 80
End: 2025-05-12

## 2025-05-12 NOTE — TELEPHONE ENCOUNTER
Patient called and stated that she is congested and coughing, she would like to know if Dr. White would like for her to come in or if he would just prescribe her something.      Please advise

## 2025-05-13 ENCOUNTER — OFFICE VISIT (OUTPATIENT)
Dept: FAMILY MEDICINE CLINIC | Facility: CLINIC | Age: 80
End: 2025-05-13
Payer: COMMERCIAL

## 2025-05-13 ENCOUNTER — APPOINTMENT (OUTPATIENT)
Dept: LAB | Facility: CLINIC | Age: 80
End: 2025-05-13
Payer: COMMERCIAL

## 2025-05-13 ENCOUNTER — APPOINTMENT (OUTPATIENT)
Dept: RADIOLOGY | Facility: CLINIC | Age: 80
End: 2025-05-13
Payer: COMMERCIAL

## 2025-05-13 VITALS
OXYGEN SATURATION: 96 % | HEIGHT: 62 IN | HEART RATE: 77 BPM | TEMPERATURE: 97.6 F | SYSTOLIC BLOOD PRESSURE: 138 MMHG | WEIGHT: 171 LBS | BODY MASS INDEX: 31.47 KG/M2 | DIASTOLIC BLOOD PRESSURE: 80 MMHG

## 2025-05-13 DIAGNOSIS — R20.2 PARESTHESIA: ICD-10-CM

## 2025-05-13 DIAGNOSIS — F41.9 ANXIETY: ICD-10-CM

## 2025-05-13 DIAGNOSIS — R05.1 ACUTE COUGH: Primary | ICD-10-CM

## 2025-05-13 DIAGNOSIS — R05.1 ACUTE COUGH: ICD-10-CM

## 2025-05-13 PROCEDURE — G2211 COMPLEX E/M VISIT ADD ON: HCPCS

## 2025-05-13 PROCEDURE — 71046 X-RAY EXAM CHEST 2 VIEWS: CPT

## 2025-05-13 PROCEDURE — 99213 OFFICE O/P EST LOW 20 MIN: CPT

## 2025-05-13 RX ORDER — DOXYCYCLINE HYCLATE 100 MG
100 TABLET ORAL 2 TIMES DAILY
Qty: 14 TABLET | Refills: 0 | Status: SHIPPED | OUTPATIENT
Start: 2025-05-13 | End: 2025-05-20

## 2025-05-13 RX ORDER — GABAPENTIN 100 MG/1
100 CAPSULE ORAL 3 TIMES DAILY
Qty: 270 CAPSULE | Refills: 1 | Status: SHIPPED | OUTPATIENT
Start: 2025-05-13

## 2025-05-13 RX ORDER — BENZONATATE 200 MG/1
200 CAPSULE ORAL 3 TIMES DAILY PRN
Qty: 30 CAPSULE | Refills: 0 | Status: SHIPPED | OUTPATIENT
Start: 2025-05-13 | End: 2025-05-23

## 2025-05-13 RX ORDER — VENLAFAXINE 75 MG/1
75 TABLET ORAL DAILY
Qty: 90 TABLET | Refills: 2 | Status: SHIPPED | OUTPATIENT
Start: 2025-05-13

## 2025-05-13 NOTE — PROGRESS NOTES
"Name: Gabriela Llanos      : 1945      MRN: [id number]  Encounter Provider: Christopher Gaitan PA-C  Encounter Date: 2025   Encounter department: [facility]    Assessment & Plan  Acute cough  Pt complains of sx including dry cough, fatigue, sore throat, rhinorrhea , sinus pressure, and sneezing  Sx have been present for 1 week(s) and has been getting worse since onset.   Pt has attempted to alleviate their current sx with albuterol and afrin nasal which has provided some relief.   Pertinent negatives: no chest pain, SOB, difficulty breathing, nausea, vomiting, or diarrhea .   Exam revealing diffuse wheezing and mild pharyngeal erythema, otherwise unremarkable  Given hx and exam findings, will cover for bacterial infection after discussing risk and benefits  Also will obtain CXR given wheezing  Supportive care w tessalon perles, advised increased hydration, oct heart healthy cough medication  Fu prn for worsening or non-improvement   Orders:    benzonatate (TESSALON) 200 MG capsule; Take 1 capsule (200 mg total) by mouth 3 (three) times a day as needed for cough for up to 10 days    doxycycline hyclate (VIBRA-TABS) 100 mg tablet; Take 1 tablet (100 mg total) by mouth 2 (two) times a day for 7 days    XR chest pa and lateral; Future    Anxiety    Orders:    venlafaxine (EFFEXOR) 75 mg tablet; Take 1 tablet (75 mg total) by mouth daily    Paresthesia    Orders:    gabapentin (NEURONTIN) 100 mg capsule; Take 1 capsule (100 mg total) by mouth 3 (three) times a day Take 100 mg during the day and 200 mg at night. May increase to 200 mg three times a day as needed. Caution for sedation.         History of Present Illness     Gabriela Llanos is a 79 y.o. female  presenting with concerns of cough and URI sx.        **Note: Portions of the record may have been created with voice recognition software.  Occasional wrong word or \"sound alike\" substitutions may have occurred due to the inherent " limitations of voice recognition software.  Please read the chart carefully and recognize, using context, where substitutions have occurred. Please contact for further clarification, when necessary.       Review of Systems   Constitutional:  Positive for fatigue. Negative for chills and fever.   HENT:  Positive for postnasal drip, rhinorrhea and sneezing. Negative for ear pain and sore throat.    Eyes:  Negative for pain and visual disturbance.   Respiratory:  Positive for cough. Negative for shortness of breath.    Cardiovascular:  Negative for chest pain and palpitations.   Gastrointestinal:  Negative for abdominal pain, constipation, diarrhea, nausea and vomiting.   Genitourinary:  Negative for dysuria and hematuria.   Musculoskeletal:  Negative for arthralgias and back pain.   Skin:  Negative for color change and rash.   Neurological:  Negative for seizures and syncope.   All other systems reviewed and are negative.    Past Medical History:   Diagnosis Date    Acute blood loss anemia 6/5/2020    Arm contusion, right 2/2 Coumadin toxicity 6/4/2020    Arm swelling 6/4/2020    Atrial fibrillation (HCC)     Bilateral impacted cerumen 6/18/2020    Breast cancer (HCC) 1989    left    Diverticulitis-recent 6/5/2020    History of chemotherapy 1989    left breast    Irregular heart beat     Medicare annual wellness visit, subsequent 6/18/2020    Tick bite with subsequent removal of tick 5/28/2019    Last Assessment & Plan:  On doxy so no need for lymes testing or additional antibiotic     Past Surgical History:   Procedure Laterality Date    APPENDECTOMY      AUGMENTATION MAMMAPLASTY Right 1989    implant    CARDIAC ELECTROPHYSIOLOGY STUDY, ESOPHAGEAL      CARDIAC VALVE REPLACEMENT      MV    COLONOSCOPY      HYSTERECTOMY  2002    MASTECTOMY Left 1989    OOPHORECTOMY Bilateral 2002    TONSILLECTOMY       Family History   Problem Relation Age of Onset    Diabetes Mother     Cancer Mother     Breast cancer Mother 37     Colon cancer Father     Colon cancer Paternal Grandmother     Colon cancer Paternal Grandfather     No Known Problems Daughter     No Known Problems Daughter     Breast cancer Maternal Aunt 45    Breast cancer Maternal Aunt         age unknown    Breast cancer Maternal Aunt         age unknown    Breast cancer Maternal Aunt         age unknown    No Known Problems Paternal Aunt      Social History     Tobacco Use    Smoking status: Never     Passive exposure: Never    Smokeless tobacco: Never   Vaping Use    Vaping status: Never Used   Substance and Sexual Activity    Alcohol use: Yes     Comment: occasionally    Drug use: Never    Sexual activity: Not on file     Current Outpatient Medications on File Prior to Visit   Medication Sig    acetaminophen (TYLENOL) 325 mg tablet Take 2 tablets (650 mg total) by mouth every 6 (six) hours as needed for mild pain, headaches or fever (Patient taking differently: Take 1,000 mg by mouth daily ALSO 500MG PRN)    albuterol (Ventolin HFA) 90 mcg/act inhaler Inhale 2 puffs every 6 (six) hours as needed for wheezing    alendronate (FOSAMAX) 70 mg tablet take 1 tablet by mouth every week    diclofenac sodium (VOLTAREN) 1 % Apply 2 g topically 4 (four) times a day (Patient taking differently: Apply 2 g topically 4 (four) times a day PRN)    digoxin (LANOXIN) 0.125 mg tablet Take 125 mcg by mouth daily    digoxin (LANOXIN) 0.25 mg tablet Take 1 tablet by mouth in the morning    diltiazem (Cardizem) 120 MG tablet Take 120 mg by mouth in the morning    dofetilide (TIKOSYN) 250 mcg capsule Take 1 capsule by mouth 3 (three) times a day    estradiol (ESTRACE) 0.1 mg/g vaginal cream Apply a pea-sized amount to the vaginal opening 3 times a week (Mon-Wed-Fri)    gabapentin (NEURONTIN) 100 mg capsule Take 1 capsule (100 mg total) by mouth 3 (three) times a day Take 100 mg during the day and 200 mg at night. May increase to 200 mg three times a day as needed. Caution for sedation.     irbesartan (AVAPRO) 150 mg tablet Take 1 tablet (150 mg total) by mouth daily at bedtime    levothyroxine 88 mcg tablet Take 88 mcg by mouth daily    lidocaine (Lidoderm) 5 % Apply 1 patch topically over 12 hours daily Remove & Discard patch within 12 hours or as directed by MD    LORazepam (ATIVAN) 0.5 mg tablet take 1 tablet by mouth at bedtime if needed for sleep    magnesium Oxide (MAG-OX) 400 mg TABS Take 1 tablet by mouth daily    melatonin 1 mg Take 20 mg by mouth daily at bedtime as needed    metoprolol succinate (TOPROL-XL) 50 mg 24 hr tablet 50 mg 2 (two) times a day     Multiple Vitamin (MULTIVITAMIN PO) Take by mouth in the morning PATCH    pantoprazole (PROTONIX) 40 mg tablet take 1 tablet by mouth twice a day    RA LAXATIVE powder take 17GM (DISSOLVED IN WATER) by mouth once daily if needed for constipation    rosuvastatin (CRESTOR) 10 MG tablet Take 1 tablet by mouth in the morning    sodium chloride (OCEAN) 0.65 % nasal spray 1 spray into each nostril as needed for congestion or rhinitis    traZODone (DESYREL) 50 mg tablet Take 50 mg by mouth daily at bedtime    venlafaxine (EFFEXOR) 75 mg tablet Take 75 mg by mouth daily     warfarin (COUMADIN) 5 mg tablet Take 1 tablet by mouth in the morning     Allergies   Allergen Reactions    Penicillins Anaphylaxis and Other (See Comments)     Other reaction(s): Anaphylaxis      Iodides      Other reaction(s): Rash      Amiodarone Other (See Comments)    Clindamycin Other (See Comments) and GI Intolerance    Iodinated Contrast Media Other (See Comments)    Morphine Rash     Immunization History   Administered Date(s) Administered    COVID-19 PFIZER VACCINE 0.3 ML IM 04/08/2021, 04/29/2021    Influenza Split 11/08/2013    Influenza Split High Dose Preservative Free IM 10/21/2016, 10/05/2017, 10/02/2018    Pneumococcal Conjugate 13-Valent 04/27/2016    Pneumococcal Polysaccharide PPV23 09/03/2005, 11/08/2013, 10/08/2019    Zoster 06/24/2016    influenza,  "trivalent, adjuvanted 02/03/2025     Objective   /80   Pulse 77   Temp 97.6 °F (36.4 °C)   Ht 5' 2\" (1.575 m)   Wt 77.6 kg (171 lb)   SpO2 96%   BMI 31.28 kg/m²     Physical Exam  Vitals and nursing note reviewed.   Constitutional:       General: She is not in acute distress.     Appearance: She is well-developed. She is not ill-appearing.   HENT:      Head: Normocephalic and atraumatic.      Right Ear: Tympanic membrane normal.      Left Ear: Tympanic membrane normal.      Nose: Nose normal.      Mouth/Throat:      Pharynx: Oropharynx is clear. Posterior oropharyngeal erythema present. No oropharyngeal exudate.   Eyes:      Conjunctiva/sclera: Conjunctivae normal.      Pupils: Pupils are equal, round, and reactive to light.   Cardiovascular:      Rate and Rhythm: Normal rate and regular rhythm.      Heart sounds: No murmur heard.  Pulmonary:      Effort: Pulmonary effort is normal. No respiratory distress.      Breath sounds: Wheezing present.   Abdominal:      General: Abdomen is flat.   Musculoskeletal:         General: No swelling.      Cervical back: Neck supple.   Skin:     General: Skin is warm and dry.   Neurological:      Mental Status: She is alert and oriented to person, place, and time.   Psychiatric:         Mood and Affect: Mood normal.         "

## 2025-05-14 ENCOUNTER — RESULTS FOLLOW-UP (OUTPATIENT)
Dept: FAMILY MEDICINE CLINIC | Facility: CLINIC | Age: 80
End: 2025-05-14

## 2025-05-27 ENCOUNTER — APPOINTMENT (OUTPATIENT)
Dept: LAB | Facility: CLINIC | Age: 80
End: 2025-05-27
Payer: COMMERCIAL

## 2025-05-27 DIAGNOSIS — Z79.899 POLYPHARMACY: ICD-10-CM

## 2025-05-27 DIAGNOSIS — R73.01 IMPAIRED FASTING GLUCOSE: ICD-10-CM

## 2025-05-27 DIAGNOSIS — E55.9 AVITAMINOSIS D: ICD-10-CM

## 2025-05-27 DIAGNOSIS — I51.9 MYXEDEMA HEART DISEASE: ICD-10-CM

## 2025-05-27 DIAGNOSIS — E53.8 BIOTIN-(PROPIONYL-COA-CARBOXYLASE) LIGASE DEFICIENCY: ICD-10-CM

## 2025-05-27 DIAGNOSIS — I48.0 PAROXYSMAL ATRIAL FIBRILLATION (HCC): ICD-10-CM

## 2025-05-27 DIAGNOSIS — E78.5 HYPERLIPIDEMIA, UNSPECIFIED HYPERLIPIDEMIA TYPE: ICD-10-CM

## 2025-05-27 DIAGNOSIS — E03.9 MYXEDEMA HEART DISEASE: ICD-10-CM

## 2025-05-27 LAB
ALBUMIN SERPL BCG-MCNC: 4.1 G/DL (ref 3.5–5)
ALP SERPL-CCNC: 94 U/L (ref 34–104)
ALT SERPL W P-5'-P-CCNC: 14 U/L (ref 7–52)
ANION GAP SERPL CALCULATED.3IONS-SCNC: 7 MMOL/L (ref 4–13)
AST SERPL W P-5'-P-CCNC: 23 U/L (ref 13–39)
BASOPHILS # BLD AUTO: 0.13 THOUSANDS/ÂΜL (ref 0–0.1)
BASOPHILS NFR BLD AUTO: 2 % (ref 0–1)
BILIRUB SERPL-MCNC: 0.59 MG/DL (ref 0.2–1)
BUN SERPL-MCNC: 14 MG/DL (ref 5–25)
CALCIUM SERPL-MCNC: 9.1 MG/DL (ref 8.4–10.2)
CHLORIDE SERPL-SCNC: 104 MMOL/L (ref 96–108)
CHOLEST SERPL-MCNC: 189 MG/DL (ref ?–200)
CO2 SERPL-SCNC: 29 MMOL/L (ref 21–32)
CREAT SERPL-MCNC: 0.8 MG/DL (ref 0.6–1.3)
EOSINOPHIL # BLD AUTO: 0.12 THOUSAND/ÂΜL (ref 0–0.61)
EOSINOPHIL NFR BLD AUTO: 2 % (ref 0–6)
ERYTHROCYTE [DISTWIDTH] IN BLOOD BY AUTOMATED COUNT: 13.4 % (ref 11.6–15.1)
EST. AVERAGE GLUCOSE BLD GHB EST-MCNC: 120 MG/DL
GFR SERPL CREATININE-BSD FRML MDRD: 70 ML/MIN/1.73SQ M
GLUCOSE P FAST SERPL-MCNC: 81 MG/DL (ref 65–99)
HBA1C MFR BLD: 5.8 %
HCT VFR BLD AUTO: 40.2 % (ref 34.8–46.1)
HDLC SERPL-MCNC: 63 MG/DL
HGB BLD-MCNC: 13.5 G/DL (ref 11.5–15.4)
IMM GRANULOCYTES # BLD AUTO: 0.02 THOUSAND/UL (ref 0–0.2)
IMM GRANULOCYTES NFR BLD AUTO: 0 % (ref 0–2)
INR PPP: 1.92 (ref 0.85–1.19)
LDLC SERPL CALC-MCNC: 102 MG/DL (ref 0–100)
LYMPHOCYTES # BLD AUTO: 1.76 THOUSANDS/ÂΜL (ref 0.6–4.47)
LYMPHOCYTES NFR BLD AUTO: 26 % (ref 14–44)
MCH RBC QN AUTO: 32.2 PG (ref 26.8–34.3)
MCHC RBC AUTO-ENTMCNC: 33.6 G/DL (ref 31.4–37.4)
MCV RBC AUTO: 96 FL (ref 82–98)
MONOCYTES # BLD AUTO: 0.53 THOUSAND/ÂΜL (ref 0.17–1.22)
MONOCYTES NFR BLD AUTO: 8 % (ref 4–12)
NEUTROPHILS # BLD AUTO: 4.15 THOUSANDS/ÂΜL (ref 1.85–7.62)
NEUTS SEG NFR BLD AUTO: 62 % (ref 43–75)
NONHDLC SERPL-MCNC: 126 MG/DL
NRBC BLD AUTO-RTO: 0 /100 WBCS
PLATELET # BLD AUTO: 341 THOUSANDS/UL (ref 149–390)
PMV BLD AUTO: 10.6 FL (ref 8.9–12.7)
POTASSIUM SERPL-SCNC: 4.1 MMOL/L (ref 3.5–5.3)
PROT SERPL-MCNC: 7 G/DL (ref 6.4–8.4)
PROTHROMBIN TIME: 22 SECONDS (ref 12.3–15)
RBC # BLD AUTO: 4.19 MILLION/UL (ref 3.81–5.12)
SODIUM SERPL-SCNC: 140 MMOL/L (ref 135–147)
T4 FREE SERPL-MCNC: 0.61 NG/DL (ref 0.61–1.12)
TRIGL SERPL-MCNC: 118 MG/DL (ref ?–150)
TSH SERPL DL<=0.05 MIU/L-ACNC: 8.65 UIU/ML (ref 0.45–4.5)
VIT B12 SERPL-MCNC: 227 PG/ML (ref 180–914)
WBC # BLD AUTO: 6.71 THOUSAND/UL (ref 4.31–10.16)

## 2025-05-27 PROCEDURE — 84443 ASSAY THYROID STIM HORMONE: CPT

## 2025-05-27 PROCEDURE — 82306 VITAMIN D 25 HYDROXY: CPT

## 2025-05-27 PROCEDURE — 80053 COMPREHEN METABOLIC PANEL: CPT

## 2025-05-27 PROCEDURE — 85025 COMPLETE CBC W/AUTO DIFF WBC: CPT

## 2025-05-27 PROCEDURE — 80061 LIPID PANEL: CPT

## 2025-05-27 PROCEDURE — 84439 ASSAY OF FREE THYROXINE: CPT

## 2025-05-27 PROCEDURE — 36415 COLL VENOUS BLD VENIPUNCTURE: CPT

## 2025-05-27 PROCEDURE — 83036 HEMOGLOBIN GLYCOSYLATED A1C: CPT

## 2025-05-27 PROCEDURE — 82607 VITAMIN B-12: CPT

## 2025-05-27 PROCEDURE — 85610 PROTHROMBIN TIME: CPT

## 2025-05-28 LAB — 25(OH)D3 SERPL-MCNC: 34.3 NG/ML (ref 30–100)

## 2025-06-05 DIAGNOSIS — F51.01 PRIMARY INSOMNIA: ICD-10-CM

## 2025-06-05 RX ORDER — LORAZEPAM 0.5 MG/1
0.5 TABLET ORAL
Qty: 20 TABLET | Refills: 0 | Status: SHIPPED | OUTPATIENT
Start: 2025-06-05

## 2025-06-05 NOTE — TELEPHONE ENCOUNTER
Patient called to request a refill for their lorazepam advised a refill was requested on 6/5/25 and is pending approval. Patient verbalized understanding and is in agreement.     Does the patient have enough for 3 days?   [] Yes   [x] No - Send as HP to POD

## 2025-07-08 DIAGNOSIS — E78.5 DYSLIPIDEMIA: ICD-10-CM

## 2025-07-08 DIAGNOSIS — I48.91 ATRIAL FIBRILLATION, UNSPECIFIED TYPE (HCC): ICD-10-CM

## 2025-07-08 DIAGNOSIS — E78.5 DYSLIPIDEMIA: Primary | ICD-10-CM

## 2025-07-08 DIAGNOSIS — F51.01 PRIMARY INSOMNIA: ICD-10-CM

## 2025-07-08 DIAGNOSIS — F41.9 ANXIETY: ICD-10-CM

## 2025-07-08 DIAGNOSIS — R10.13 EPIGASTRIC PAIN: ICD-10-CM

## 2025-07-08 DIAGNOSIS — R20.2 PARESTHESIA: ICD-10-CM

## 2025-07-08 RX ORDER — PANTOPRAZOLE SODIUM 40 MG/1
40 TABLET, DELAYED RELEASE ORAL 2 TIMES DAILY
Qty: 60 TABLET | Refills: 5 | Status: SHIPPED | OUTPATIENT
Start: 2025-07-08 | End: 2025-07-08 | Stop reason: SDUPTHER

## 2025-07-08 RX ORDER — LORAZEPAM 0.5 MG/1
0.5 TABLET ORAL
Qty: 20 TABLET | Refills: 0 | Status: SHIPPED | OUTPATIENT
Start: 2025-07-08 | End: 2025-07-08 | Stop reason: SDUPTHER

## 2025-07-08 RX ORDER — LORAZEPAM 0.5 MG/1
0.5 TABLET ORAL
Qty: 20 TABLET | Refills: 0 | Status: SHIPPED | OUTPATIENT
Start: 2025-07-08

## 2025-07-08 RX ORDER — VENLAFAXINE 75 MG/1
75 TABLET ORAL DAILY
Qty: 90 TABLET | Refills: 2 | Status: SHIPPED | OUTPATIENT
Start: 2025-07-08

## 2025-07-08 RX ORDER — WARFARIN SODIUM 5 MG/1
5 TABLET ORAL DAILY
Qty: 90 TABLET | Refills: 0 | OUTPATIENT
Start: 2025-07-08

## 2025-07-08 RX ORDER — GABAPENTIN 100 MG/1
100 CAPSULE ORAL 3 TIMES DAILY
Qty: 270 CAPSULE | Refills: 1 | Status: SHIPPED | OUTPATIENT
Start: 2025-07-08 | End: 2025-07-08 | Stop reason: SDUPTHER

## 2025-07-08 RX ORDER — VENLAFAXINE 75 MG/1
75 TABLET ORAL DAILY
Qty: 90 TABLET | Refills: 2 | Status: SHIPPED | OUTPATIENT
Start: 2025-07-08 | End: 2025-07-08 | Stop reason: SDUPTHER

## 2025-07-08 RX ORDER — METOPROLOL SUCCINATE 50 MG/1
50 TABLET, EXTENDED RELEASE ORAL 2 TIMES DAILY
Qty: 180 TABLET | Refills: 1 | Status: SHIPPED | OUTPATIENT
Start: 2025-07-08

## 2025-07-08 RX ORDER — PANTOPRAZOLE SODIUM 40 MG/1
40 TABLET, DELAYED RELEASE ORAL 2 TIMES DAILY
Qty: 60 TABLET | Refills: 5 | Status: SHIPPED | OUTPATIENT
Start: 2025-07-08

## 2025-07-08 RX ORDER — IRBESARTAN 150 MG/1
150 TABLET ORAL
Qty: 30 TABLET | Refills: 3 | Status: SHIPPED | OUTPATIENT
Start: 2025-07-08 | End: 2025-07-08 | Stop reason: SDUPTHER

## 2025-07-08 RX ORDER — ROSUVASTATIN CALCIUM 10 MG/1
10 TABLET, COATED ORAL DAILY
Qty: 90 TABLET | Refills: 1 | Status: SHIPPED | OUTPATIENT
Start: 2025-07-08 | End: 2025-07-08 | Stop reason: SDUPTHER

## 2025-07-08 RX ORDER — METOPROLOL SUCCINATE 50 MG/1
50 TABLET, EXTENDED RELEASE ORAL 2 TIMES DAILY
Qty: 180 TABLET | Refills: 1 | Status: SHIPPED | OUTPATIENT
Start: 2025-07-08 | End: 2025-07-08 | Stop reason: SDUPTHER

## 2025-07-08 RX ORDER — IRBESARTAN 150 MG/1
150 TABLET ORAL
Qty: 30 TABLET | Refills: 3 | Status: SHIPPED | OUTPATIENT
Start: 2025-07-08 | End: 2025-08-07

## 2025-07-08 RX ORDER — GABAPENTIN 100 MG/1
100 CAPSULE ORAL 3 TIMES DAILY
Qty: 270 CAPSULE | Refills: 1 | Status: SHIPPED | OUTPATIENT
Start: 2025-07-08 | End: 2025-07-10 | Stop reason: SDUPTHER

## 2025-07-08 RX ORDER — ROSUVASTATIN CALCIUM 10 MG/1
10 TABLET, COATED ORAL DAILY
Qty: 90 TABLET | Refills: 1 | Status: SHIPPED | OUTPATIENT
Start: 2025-07-08

## 2025-07-08 RX ORDER — DOFETILIDE 0.25 MG/1
250 CAPSULE ORAL 3 TIMES DAILY
Qty: 270 CAPSULE | Refills: 0 | OUTPATIENT
Start: 2025-07-08

## 2025-07-10 DIAGNOSIS — R20.2 PARESTHESIA: ICD-10-CM

## 2025-07-10 RX ORDER — GABAPENTIN 100 MG/1
CAPSULE ORAL
Qty: 270 CAPSULE | Refills: 1 | Status: SHIPPED | OUTPATIENT
Start: 2025-07-10

## 2025-07-23 ENCOUNTER — NURSE TRIAGE (OUTPATIENT)
Age: 80
End: 2025-07-23

## 2025-07-23 NOTE — TELEPHONE ENCOUNTER
"REASON FOR CONVERSATION: Dental Pain    SYMPTOMS: moderate dental pain on her upper right tooth.  When pt touches the area above the gum line is very tender. Per pt slight swelling on the face. Denies fever    OTHER HEALTH INFORMATION: per patient she was seen by dentist in [place] last week and he was doing dental work. He had to stop and advise seeing an endodontist because she needs a root canal. Patient was prescribed antibiotic and has one for pill. Patient scheduled with an endodontist in [place] she has seen before but appt not until 8/12/25. Patient concern that its too far out and needs to seen sooner. Pt was also advised to contact insurance to help her look for an endodontist that would be in her network and locally for a sooner appt.     PROTOCOL DISPOSITION: See Today or Tomorrow in Office (overriding Go to Office Now/Urgent Care)    CARE ADVICE PROVIDED: patient was advised to contact her dentist and see if they would call in more antibiotic. Patient requested an appt instead. A warm transfer to the office was made to check if any accommodations for today. Per staff all providers are booked. Patient was advised to be seen at urgent care today. Patient states that she feel more comfortable coming to the office to one of the other providers that she still has one for antibiotic and is taking otc tylenol. Pt was given an appt for tomorrow 7/24/25 at 3:00 PM w/ Lilly Chu MD.  Pt was advised to call back with worsening/new symptoms and if afterwards to be seen at urgent care or ED.     PRACTICE FOLLOW-UP: n/a          Reason for Disposition   Face is swollen    Answer Assessment - Initial Assessment Questions  1. LOCATION: \"Which tooth is hurting?\"  (e.g., right-side/left-side, upper/lower, front/back)      Upper right ( per patient needs root canal)     2. ONSET: \"When did the toothache start?\"  (e.g., hours, days)       1 week ago    3. SEVERITY: \"How bad is the toothache?\"  (Scale 1-10; mild, moderate or " "severe)      Moderate . When pt touches the area above the gum line is very tender and can't bite down has eating soft food    4. SWELLING: \"Is there any visible swelling of your face?\"      Slight swelling     5. OTHER SYMPTOMS: \"Do you have any other symptoms?\" (e.g., fever)      Denies    Protocols used: Toothache-Adult-[place]    "

## 2025-07-24 ENCOUNTER — OFFICE VISIT (OUTPATIENT)
Dept: FAMILY MEDICINE CLINIC | Facility: CLINIC | Age: 80
End: 2025-07-24
Payer: COMMERCIAL

## 2025-07-24 VITALS
DIASTOLIC BLOOD PRESSURE: 60 MMHG | HEART RATE: 68 BPM | BODY MASS INDEX: 32.2 KG/M2 | TEMPERATURE: 98 F | WEIGHT: 175 LBS | OXYGEN SATURATION: 94 % | SYSTOLIC BLOOD PRESSURE: 108 MMHG | HEIGHT: 62 IN

## 2025-07-24 DIAGNOSIS — K04.7 DENTAL INFECTION: Primary | ICD-10-CM

## 2025-07-24 DIAGNOSIS — E66.9 OBESITY (BMI 30-39.9): ICD-10-CM

## 2025-07-24 PROCEDURE — G2211 COMPLEX E/M VISIT ADD ON: HCPCS | Performed by: FAMILY MEDICINE

## 2025-07-24 PROCEDURE — 99213 OFFICE O/P EST LOW 20 MIN: CPT | Performed by: FAMILY MEDICINE

## 2025-07-24 RX ORDER — DOXYCYCLINE HYCLATE 100 MG
100 TABLET ORAL 2 TIMES DAILY
Qty: 14 TABLET | Refills: 0 | Status: SHIPPED | OUTPATIENT
Start: 2025-07-24 | End: 2025-07-31

## 2025-07-24 NOTE — PROGRESS NOTES
"Name: Gabriela Llanos      : 1945      MRN: [id number]  Encounter Provider: Lilly Chu MD  Encounter Date: 2025   Encounter department: [facility]  :  Assessment & Plan  Dental infection  Will treat with Doxycycline  (Allergy Penicillin and Clindamycin)  Advised follow-up with dentist   Orders:  •  doxycycline hyclate (VIBRA-TABS) 100 mg tablet; Take 1 tablet (100 mg total) by mouth 2 (two) times a day for 7 days      Assessment & Plan           History of Present Illness   She is having a dental issue, pain and swelling.       Review of Systems   HENT:  Positive for dental problem and facial swelling.        Objective   /60   Pulse 68   Temp 98 °F (36.7 °C)   Ht 5' 2\" (1.575 m)   Wt 79.4 kg (175 lb)   SpO2 94%   BMI 32.01 kg/m²      Physical Exam  Vitals and nursing note reviewed.   HENT:      Right Ear: Tympanic membrane, ear canal and external ear normal. There is no impacted cerumen.      Left Ear: Tympanic membrane, ear canal and external ear normal. There is no impacted cerumen.      Mouth/Throat:      Mouth: Mucous membranes are moist.      Dentition: Gingival swelling present.      Pharynx: Oropharynx is clear.      Comments: Mild erythema R upper gum  Pulmonary:      Effort: Pulmonary effort is normal.     Neurological:      General: No focal deficit present.     Psychiatric:         Mood and Affect: Mood normal.         "